# Patient Record
Sex: FEMALE | Race: BLACK OR AFRICAN AMERICAN | NOT HISPANIC OR LATINO | Employment: OTHER | URBAN - METROPOLITAN AREA
[De-identification: names, ages, dates, MRNs, and addresses within clinical notes are randomized per-mention and may not be internally consistent; named-entity substitution may affect disease eponyms.]

---

## 2017-01-19 ENCOUNTER — TELEPHONE (OUTPATIENT)
Dept: INTERNAL MEDICINE | Facility: CLINIC | Age: 82
End: 2017-01-19

## 2017-01-19 DIAGNOSIS — F03.91 DEMENTIA WITH BEHAVIORAL DISTURBANCE, UNSPECIFIED DEMENTIA TYPE: Primary | ICD-10-CM

## 2017-01-19 DIAGNOSIS — F03.90 DEMENTIA WITHOUT BEHAVIORAL DISTURBANCE, UNSPECIFIED DEMENTIA TYPE: ICD-10-CM

## 2017-01-19 DIAGNOSIS — R41.3 MEMORY CHANGES: ICD-10-CM

## 2017-01-19 NOTE — TELEPHONE ENCOUNTER
----- Message from Tila Juan sent at 1/19/2017  2:49 PM CST -----  Contact: pt's daughter maged [phone]  _  1st Request  _  2nd Request  _  3rd Request        Who: pt    Why: pt has dementia and is experiencing progress. Wants the doctor to refer her to a neurologist or does she need to be seen by dr fulton first    What Number to Call Back:pt's daughter maged [phone]    When to Expect a call back: (Before the end of the day)   -- if call after 3:00 call back will be tomorrow.

## 2017-01-20 ENCOUNTER — TELEPHONE (OUTPATIENT)
Dept: INTERNAL MEDICINE | Facility: CLINIC | Age: 82
End: 2017-01-20

## 2017-01-20 ENCOUNTER — PATIENT MESSAGE (OUTPATIENT)
Dept: NEUROLOGY | Facility: CLINIC | Age: 82
End: 2017-01-20

## 2017-01-20 NOTE — TELEPHONE ENCOUNTER
Recommend follow up with PCP regarding questionable UTI/dehydration/metabolic workup. Happy to reschedule but if there is an acute or imminently dangerous change, she should see PCP or go to ER asap.

## 2017-01-20 NOTE — TELEPHONE ENCOUNTER
----- Message from Lo Hardy sent at 1/19/2017 12:00 PM CST -----  Contact: Alicia Parker (Daughter)  X   1st Request  _  2nd Request  _  3rd Request        Who: Alicia Parker (Daughter)    Why: Pt's daughter is following up on a request to speak to the clinical team regarding a referral for Neurology. Please call, thanks!    What Number to Call Back: [phone]    When to Expect a call back: (Before the end of the day)   -- if call after 3:00 call back will be tomorrow.

## 2017-01-24 ENCOUNTER — OFFICE VISIT (OUTPATIENT)
Dept: INTERNAL MEDICINE | Facility: CLINIC | Age: 82
End: 2017-01-24
Payer: MEDICARE

## 2017-01-24 ENCOUNTER — LAB VISIT (OUTPATIENT)
Dept: LAB | Facility: OTHER | Age: 82
End: 2017-01-24
Attending: INTERNAL MEDICINE
Payer: MEDICARE

## 2017-01-24 VITALS
WEIGHT: 116.38 LBS | BODY MASS INDEX: 23.46 KG/M2 | DIASTOLIC BLOOD PRESSURE: 72 MMHG | HEIGHT: 59 IN | HEART RATE: 110 BPM | SYSTOLIC BLOOD PRESSURE: 108 MMHG | OXYGEN SATURATION: 95 %

## 2017-01-24 DIAGNOSIS — R00.0 TACHYCARDIA: ICD-10-CM

## 2017-01-24 DIAGNOSIS — R46.89 CHANGE IN BEHAVIOR: Primary | ICD-10-CM

## 2017-01-24 DIAGNOSIS — R46.89 CHANGE IN BEHAVIOR: ICD-10-CM

## 2017-01-24 LAB
ALBUMIN SERPL BCP-MCNC: 3.2 G/DL
ALP SERPL-CCNC: 76 U/L
ALT SERPL W/O P-5'-P-CCNC: 15 U/L
ANION GAP SERPL CALC-SCNC: 10 MMOL/L
AST SERPL-CCNC: 19 U/L
BASOPHILS # BLD AUTO: 0.01 K/UL
BASOPHILS NFR BLD: 0.2 %
BILIRUB SERPL-MCNC: 0.7 MG/DL
BUN SERPL-MCNC: 11 MG/DL
CALCIUM SERPL-MCNC: 9.7 MG/DL
CHLORIDE SERPL-SCNC: 103 MMOL/L
CO2 SERPL-SCNC: 25 MMOL/L
CREAT SERPL-MCNC: 0.8 MG/DL
DIFFERENTIAL METHOD: NORMAL
EOSINOPHIL # BLD AUTO: 0 K/UL
EOSINOPHIL NFR BLD: 0.6 %
ERYTHROCYTE [DISTWIDTH] IN BLOOD BY AUTOMATED COUNT: 13.6 %
EST. GFR  (AFRICAN AMERICAN): >60 ML/MIN/1.73 M^2
EST. GFR  (NON AFRICAN AMERICAN): >60 ML/MIN/1.73 M^2
GLUCOSE SERPL-MCNC: 156 MG/DL
HCT VFR BLD AUTO: 38.6 %
HGB BLD-MCNC: 12.6 G/DL
LYMPHOCYTES # BLD AUTO: 1.2 K/UL
LYMPHOCYTES NFR BLD: 23.2 %
MCH RBC QN AUTO: 27.9 PG
MCHC RBC AUTO-ENTMCNC: 32.6 %
MCV RBC AUTO: 85 FL
MONOCYTES # BLD AUTO: 0.5 K/UL
MONOCYTES NFR BLD: 9.9 %
NEUTROPHILS # BLD AUTO: 3.3 K/UL
NEUTROPHILS NFR BLD: 65.9 %
PLATELET # BLD AUTO: 225 K/UL
PMV BLD AUTO: 10.7 FL
POTASSIUM SERPL-SCNC: 3.9 MMOL/L
PROT SERPL-MCNC: 8.4 G/DL
RBC # BLD AUTO: 4.52 M/UL
SODIUM SERPL-SCNC: 138 MMOL/L
WBC # BLD AUTO: 5.05 K/UL

## 2017-01-24 PROCEDURE — 80053 COMPREHEN METABOLIC PANEL: CPT

## 2017-01-24 PROCEDURE — 1160F RVW MEDS BY RX/DR IN RCRD: CPT | Mod: S$GLB,,, | Performed by: INTERNAL MEDICINE

## 2017-01-24 PROCEDURE — 85025 COMPLETE CBC W/AUTO DIFF WBC: CPT

## 2017-01-24 PROCEDURE — 1157F ADVNC CARE PLAN IN RCRD: CPT | Mod: S$GLB,,, | Performed by: INTERNAL MEDICINE

## 2017-01-24 PROCEDURE — 1159F MED LIST DOCD IN RCRD: CPT | Mod: S$GLB,,, | Performed by: INTERNAL MEDICINE

## 2017-01-24 PROCEDURE — 99213 OFFICE O/P EST LOW 20 MIN: CPT | Mod: S$GLB,,, | Performed by: INTERNAL MEDICINE

## 2017-01-24 PROCEDURE — 99999 PR PBB SHADOW E&M-EST. PATIENT-LVL III: CPT | Mod: PBBFAC,,, | Performed by: INTERNAL MEDICINE

## 2017-01-24 PROCEDURE — 36415 COLL VENOUS BLD VENIPUNCTURE: CPT

## 2017-01-24 PROCEDURE — 1125F AMNT PAIN NOTED PAIN PRSNT: CPT | Mod: S$GLB,,, | Performed by: INTERNAL MEDICINE

## 2017-01-24 RX ORDER — ERGOCALCIFEROL 1.25 MG/1
10000 CAPSULE ORAL
COMMUNITY
End: 2017-02-07

## 2017-01-24 NOTE — PROGRESS NOTES
"Subjective:       Patient ID: Kim Bah is a 84 y.o. female.    Chief Complaint: Hearing Problem    HPI Comments:   Pt brought in by her daughter.      Pt's daughter is providing the history due to pt's dementia.    Pt's daughter is concerned about a gradual change in pt's behavior which started 2-3 weeks ago.  Pt has become more paranoid and is having some delusions.  Her sleep has also been somewhat worse.      She is not having any other sx.     Pt's daughter decreased dose of prednisone from 5 to 1 for a few days and this did not appear to make any difference.         Review of Systems   Constitutional: Negative.    HENT: Negative.    Eyes: Negative.    Respiratory: Negative.    Cardiovascular: Negative.    Gastrointestinal: Negative.    Genitourinary: Negative.    Musculoskeletal: Negative.    Skin: Negative.    Neurological: Negative.    Psychiatric/Behavioral: Negative.        Objective:       Vitals:    01/24/17 1142   BP: 108/72   Pulse: 110   SpO2: 95%   Weight: 52.8 kg (116 lb 6.5 oz)   Height: 4' 11" (1.499 m)     Physical Exam   Constitutional: She appears well-developed and well-nourished. No distress.   HENT:   Head: Normocephalic and atraumatic.   Right Ear: Tympanic membrane, external ear and ear canal normal.   Left Ear: Tympanic membrane, external ear and ear canal normal.   Mouth/Throat: Uvula is midline, oropharynx is clear and moist and mucous membranes are normal. No oropharyngeal exudate or posterior oropharyngeal erythema.   Eyes: Conjunctivae and EOM are normal. Pupils are equal, round, and reactive to light.   Neck: Normal range of motion. Neck supple.   Cardiovascular: Normal rate, regular rhythm and normal heart sounds.  Exam reveals no gallop and no friction rub.    No murmur heard.  Pulmonary/Chest: Effort normal and breath sounds normal. No respiratory distress. She has no wheezes. She has no rhonchi. She has no rales.   Musculoskeletal: She exhibits no edema.   Lymphadenopathy: "     She has no cervical adenopathy.   Neurological: She is alert.   Pt alert, making good eye contact, asking some questions.  She is responsive to commands.     Skin: No rash noted. She is not diaphoretic.   Psychiatric: She has a normal mood and affect. Her behavior is normal. Thought content normal.       Assessment:       1. Change in behavior    2. Tachycardia        Plan:           Change in behavior - Will explore a possible medical cause.  Urine dipstick was equivocal for UTI.  Will send for urine cx.  Will get CBC, CMP.      Tachycardia - Previous EKGs have shown sinus tach and this appears to be the same given rate is not much greater than 100.  Most likely whatever is causing behavior change might also be causing this.

## 2017-01-25 ENCOUNTER — PATIENT MESSAGE (OUTPATIENT)
Dept: INTERNAL MEDICINE | Facility: CLINIC | Age: 82
End: 2017-01-25

## 2017-01-26 ENCOUNTER — PATIENT MESSAGE (OUTPATIENT)
Dept: INTERNAL MEDICINE | Facility: CLINIC | Age: 82
End: 2017-01-26

## 2017-01-26 LAB
BACTERIA UR CULT: NORMAL
BACTERIA UR CULT: NORMAL

## 2017-01-26 RX ORDER — SULFAMETHOXAZOLE AND TRIMETHOPRIM 800; 160 MG/1; MG/1
1 TABLET ORAL 2 TIMES DAILY
Qty: 14 TABLET | Refills: 0 | Status: SHIPPED | OUTPATIENT
Start: 2017-01-26 | End: 2017-02-02

## 2017-01-27 ENCOUNTER — PATIENT MESSAGE (OUTPATIENT)
Dept: INTERNAL MEDICINE | Facility: CLINIC | Age: 82
End: 2017-01-27

## 2017-01-27 NOTE — TELEPHONE ENCOUNTER
Somehow pt's daughter is viewing results on MyChart but not seeing my 2 recent messages to her.  Please relay to her my most recent message and also the recommendation regarding antibiotics for a possible UTI.      However, it sounds as though her mother has worsened and I agree she may need to go to the ER.  If her urine output stops or she stops eating and drinking those would be reasons to go to the ER, as would a fever or other significant change in her behavior.

## 2017-02-02 NOTE — TELEPHONE ENCOUNTER
----- Message from Tila Martinez sent at 1/27/2017 11:48 AM CST -----  Contact: pt's daughter [phone]  _  1st Request  _  2nd Request  _  3rd Request        Who: pt's daughter [phone]    Why: pt's daughter is returning nurse's phone call    What Number to Call Back:pt's daughter [phone]    When to Expect a call back: (Before the end of the day)   -- if the call is after 12:00, the call back will be tomorrow.

## 2017-02-02 NOTE — TELEPHONE ENCOUNTER
Pt daughter was inform of med amd states that mom looks like she is getting better and will call us if she has any concerns.

## 2017-02-02 NOTE — TELEPHONE ENCOUNTER
Left patient daughter a message to contact the office regarding lab results and Dr. Power recommendations.

## 2017-02-06 ENCOUNTER — PATIENT MESSAGE (OUTPATIENT)
Dept: NEUROLOGY | Facility: CLINIC | Age: 82
End: 2017-02-06

## 2017-02-07 ENCOUNTER — HOSPITAL ENCOUNTER (INPATIENT)
Facility: OTHER | Age: 82
LOS: 2 days | Discharge: HOME HEALTH | DRG: 689 | End: 2017-02-09
Attending: EMERGENCY MEDICINE | Admitting: INTERNAL MEDICINE
Payer: MEDICARE

## 2017-02-07 DIAGNOSIS — R41.82 ALTERED MENTAL STATUS, UNSPECIFIED: Primary | ICD-10-CM

## 2017-02-07 DIAGNOSIS — R31.9 URINARY TRACT INFECTION WITH HEMATURIA, SITE UNSPECIFIED: ICD-10-CM

## 2017-02-07 DIAGNOSIS — N39.0 URINARY TRACT INFECTION WITH HEMATURIA, SITE UNSPECIFIED: ICD-10-CM

## 2017-02-07 DIAGNOSIS — F22 PARANOID DELUSION: ICD-10-CM

## 2017-02-07 DIAGNOSIS — T14.90XA TRAUMA: ICD-10-CM

## 2017-02-07 LAB
ALBUMIN SERPL BCP-MCNC: 2.9 G/DL
ALP SERPL-CCNC: 64 U/L
ALT SERPL W/O P-5'-P-CCNC: 16 U/L
ANION GAP SERPL CALC-SCNC: 9 MMOL/L
AST SERPL-CCNC: 24 U/L
BACTERIA #/AREA URNS HPF: ABNORMAL /HPF
BASOPHILS # BLD AUTO: 0.02 K/UL
BASOPHILS NFR BLD: 0.3 %
BILIRUB SERPL-MCNC: 0.2 MG/DL
BILIRUB UR QL STRIP: ABNORMAL
BUN SERPL-MCNC: 19 MG/DL
CALCIUM SERPL-MCNC: 8.6 MG/DL
CHLORIDE SERPL-SCNC: 103 MMOL/L
CLARITY UR: ABNORMAL
CO2 SERPL-SCNC: 21 MMOL/L
COLOR UR: YELLOW
CREAT SERPL-MCNC: 0.9 MG/DL
DIFFERENTIAL METHOD: ABNORMAL
EOSINOPHIL # BLD AUTO: 0.2 K/UL
EOSINOPHIL NFR BLD: 2.8 %
ERYTHROCYTE [DISTWIDTH] IN BLOOD BY AUTOMATED COUNT: 13.8 %
EST. GFR  (AFRICAN AMERICAN): >60 ML/MIN/1.73 M^2
EST. GFR  (NON AFRICAN AMERICAN): 59 ML/MIN/1.73 M^2
GLUCOSE SERPL-MCNC: 102 MG/DL
GLUCOSE UR QL STRIP: NEGATIVE
HCT VFR BLD AUTO: 39.8 %
HGB BLD-MCNC: 13.4 G/DL
HGB UR QL STRIP: ABNORMAL
HYALINE CASTS #/AREA URNS LPF: 25 /LPF
KETONES UR QL STRIP: ABNORMAL
LACTATE SERPL-SCNC: 2.8 MMOL/L
LEUKOCYTE ESTERASE UR QL STRIP: ABNORMAL
LYMPHOCYTES # BLD AUTO: 0.5 K/UL
LYMPHOCYTES NFR BLD: 8.1 %
MAGNESIUM SERPL-MCNC: 1.7 MG/DL
MCH RBC QN AUTO: 28.1 PG
MCHC RBC AUTO-ENTMCNC: 33.7 %
MCV RBC AUTO: 83 FL
MICROSCOPIC COMMENT: ABNORMAL
MONOCYTES # BLD AUTO: 0.3 K/UL
MONOCYTES NFR BLD: 4.9 %
NEUTROPHILS # BLD AUTO: 4.8 K/UL
NEUTROPHILS NFR BLD: 83.6 %
NITRITE UR QL STRIP: NEGATIVE
PH UR STRIP: 6 [PH] (ref 5–8)
PHOSPHATE SERPL-MCNC: 2.4 MG/DL
PLATELET # BLD AUTO: 157 K/UL
PMV BLD AUTO: 11.2 FL
POTASSIUM SERPL-SCNC: 4.5 MMOL/L
PROT SERPL-MCNC: 7.8 G/DL
PROT UR QL STRIP: ABNORMAL
RBC # BLD AUTO: 4.77 M/UL
RBC #/AREA URNS HPF: 10 /HPF (ref 0–4)
SODIUM SERPL-SCNC: 133 MMOL/L
SP GR UR STRIP: >=1.03 (ref 1–1.03)
TSH SERPL DL<=0.005 MIU/L-ACNC: 0.91 UIU/ML
URN SPEC COLLECT METH UR: ABNORMAL
UROBILINOGEN UR STRIP-ACNC: ABNORMAL EU/DL
WBC # BLD AUTO: 5.77 K/UL
WBC #/AREA URNS HPF: 30 /HPF (ref 0–5)

## 2017-02-07 PROCEDURE — 84443 ASSAY THYROID STIM HORMONE: CPT

## 2017-02-07 PROCEDURE — 93005 ELECTROCARDIOGRAM TRACING: CPT

## 2017-02-07 PROCEDURE — 96365 THER/PROPH/DIAG IV INF INIT: CPT

## 2017-02-07 PROCEDURE — 93010 ELECTROCARDIOGRAM REPORT: CPT | Mod: ,,, | Performed by: INTERNAL MEDICINE

## 2017-02-07 PROCEDURE — 96361 HYDRATE IV INFUSION ADD-ON: CPT

## 2017-02-07 PROCEDURE — 63600175 PHARM REV CODE 636 W HCPCS: Performed by: EMERGENCY MEDICINE

## 2017-02-07 PROCEDURE — 84100 ASSAY OF PHOSPHORUS: CPT

## 2017-02-07 PROCEDURE — 81000 URINALYSIS NONAUTO W/SCOPE: CPT

## 2017-02-07 PROCEDURE — 63600175 PHARM REV CODE 636 W HCPCS: Performed by: PHYSICIAN ASSISTANT

## 2017-02-07 PROCEDURE — 83605 ASSAY OF LACTIC ACID: CPT

## 2017-02-07 PROCEDURE — 99223 1ST HOSP IP/OBS HIGH 75: CPT | Mod: ,,, | Performed by: PHYSICIAN ASSISTANT

## 2017-02-07 PROCEDURE — 87086 URINE CULTURE/COLONY COUNT: CPT

## 2017-02-07 PROCEDURE — 25000003 PHARM REV CODE 250: Performed by: PHYSICIAN ASSISTANT

## 2017-02-07 PROCEDURE — 99285 EMERGENCY DEPT VISIT HI MDM: CPT | Mod: 25

## 2017-02-07 PROCEDURE — 87040 BLOOD CULTURE FOR BACTERIA: CPT

## 2017-02-07 PROCEDURE — 80053 COMPREHEN METABOLIC PANEL: CPT

## 2017-02-07 PROCEDURE — 11000001 HC ACUTE MED/SURG PRIVATE ROOM

## 2017-02-07 PROCEDURE — 85025 COMPLETE CBC W/AUTO DIFF WBC: CPT

## 2017-02-07 PROCEDURE — 96372 THER/PROPH/DIAG INJ SC/IM: CPT

## 2017-02-07 PROCEDURE — 25000003 PHARM REV CODE 250: Performed by: EMERGENCY MEDICINE

## 2017-02-07 PROCEDURE — 83735 ASSAY OF MAGNESIUM: CPT

## 2017-02-07 RX ORDER — SULFAMETHOXAZOLE AND TRIMETHOPRIM 800; 160 MG/1; MG/1
1 TABLET ORAL 2 TIMES DAILY
Status: ON HOLD | COMMUNITY
End: 2017-02-09 | Stop reason: HOSPADM

## 2017-02-07 RX ORDER — ENOXAPARIN SODIUM 100 MG/ML
40 INJECTION SUBCUTANEOUS EVERY 24 HOURS
Status: DISCONTINUED | OUTPATIENT
Start: 2017-02-08 | End: 2017-02-10 | Stop reason: HOSPADM

## 2017-02-07 RX ORDER — ACETAMINOPHEN 325 MG/1
650 TABLET ORAL EVERY 8 HOURS PRN
Status: DISCONTINUED | OUTPATIENT
Start: 2017-02-07 | End: 2017-02-10 | Stop reason: HOSPADM

## 2017-02-07 RX ORDER — HALOPERIDOL 5 MG/ML
2.5 INJECTION INTRAMUSCULAR
Status: COMPLETED | OUTPATIENT
Start: 2017-02-07 | End: 2017-02-07

## 2017-02-07 RX ORDER — SODIUM CHLORIDE 9 MG/ML
INJECTION, SOLUTION INTRAVENOUS CONTINUOUS
Status: DISCONTINUED | OUTPATIENT
Start: 2017-02-07 | End: 2017-02-10 | Stop reason: HOSPADM

## 2017-02-07 RX ORDER — ONDANSETRON 2 MG/ML
4 INJECTION INTRAMUSCULAR; INTRAVENOUS EVERY 12 HOURS PRN
Status: DISCONTINUED | OUTPATIENT
Start: 2017-02-07 | End: 2017-02-10 | Stop reason: HOSPADM

## 2017-02-07 RX ORDER — HALOPERIDOL 5 MG/ML
1 INJECTION INTRAMUSCULAR ONCE
Status: COMPLETED | OUTPATIENT
Start: 2017-02-07 | End: 2017-02-07

## 2017-02-07 RX ORDER — NAPROXEN SODIUM 220 MG
220 TABLET ORAL DAILY PRN
COMMUNITY

## 2017-02-07 RX ORDER — HALOPERIDOL 0.5 MG/1
1 TABLET ORAL EVERY 8 HOURS PRN
Status: DISCONTINUED | OUTPATIENT
Start: 2017-02-07 | End: 2017-02-10 | Stop reason: HOSPADM

## 2017-02-07 RX ORDER — PREDNISONE 5 MG/1
5 TABLET ORAL DAILY
Status: DISCONTINUED | OUTPATIENT
Start: 2017-02-08 | End: 2017-02-10 | Stop reason: HOSPADM

## 2017-02-07 RX ORDER — ENOXAPARIN SODIUM 100 MG/ML
40 INJECTION SUBCUTANEOUS EVERY 24 HOURS
Status: DISCONTINUED | OUTPATIENT
Start: 2017-02-07 | End: 2017-02-07

## 2017-02-07 RX ORDER — ACETAMINOPHEN 500 MG
10000 TABLET ORAL WEEKLY
Status: ON HOLD | COMMUNITY
End: 2017-12-29 | Stop reason: HOSPADM

## 2017-02-07 RX ORDER — AMOXICILLIN 250 MG
1 CAPSULE ORAL 2 TIMES DAILY
Status: DISCONTINUED | OUTPATIENT
Start: 2017-02-07 | End: 2017-02-10 | Stop reason: HOSPADM

## 2017-02-07 RX ADMIN — STANDARDIZED SENNA CONCENTRATE AND DOCUSATE SODIUM 1 TABLET: 8.6; 5 TABLET, FILM COATED ORAL at 11:02

## 2017-02-07 RX ADMIN — HALOPERIDOL LACTATE 1 MG: 5 INJECTION, SOLUTION INTRAMUSCULAR at 06:02

## 2017-02-07 RX ADMIN — HALOPERIDOL LACTATE 2.5 MG: 5 INJECTION, SOLUTION INTRAMUSCULAR at 12:02

## 2017-02-07 RX ADMIN — SODIUM CHLORIDE 1000 ML: 0.9 INJECTION, SOLUTION INTRAVENOUS at 10:02

## 2017-02-07 RX ADMIN — SODIUM CHLORIDE 1632 ML: 0.9 INJECTION, SOLUTION INTRAVENOUS at 03:02

## 2017-02-07 RX ADMIN — ENOXAPARIN SODIUM 40 MG: 100 INJECTION SUBCUTANEOUS at 11:02

## 2017-02-07 RX ADMIN — CEFTRIAXONE 1 G: 1 INJECTION, SOLUTION INTRAVENOUS at 01:02

## 2017-02-07 NOTE — H&P
"[facility] Hospital Medicine  History & Physical    Patient Name: Kim Bah  MRN: [id number]  Admission Date: 2/7/2017  Attending Physician: Osmar Felipe MD   Primary Care Provider: Pavel Power MD         Patient information was obtained from patient, relative(s), past medical records and ER records.     Subjective:     Principal Problem:Altered mental status, unspecified    Chief Complaint:   Chief Complaint   Patient presents with    Fall     she fell onto carpet after getting out of bed, found lying on her back        HPI: Kim Bah is an 84 y.o. female, with dementia and RA, who presents with altered mental status worsening x 4-5 weeks, and a slip and fall out of her bed onto her back yesterday. She was noted to state to the ED provider that she believes someone put something on the floor so she would fall "because they're out to get me".  She denies head trauma and loss of consciousness. She lives at home with her daughter and sleeps in a hospital bed with rails, which her daughter states she removes frequently. She denies headache, neck pain, chest pain, and dysuria. Her daughter states she has been paranoid and having auditory hallucinations intermittently for several months. Two weeks ago they became constant. She is taking Bactrim for a "possible UTI". Additionally, her daughter decreased her long term prednisone from 5 mg last wednesday, to 2 mg on thrusday, and finally has continued her on 1 mg over the weekend to today as she "felt the dose wasn't right." Her daughter states she was able to walk with a walker last week, but she has not walked this week, even before the fall yesterday. Her daughter reports associated loss of appetite and increased and paranoia.     Past Medical History   Diagnosis Date    Difficult intubation     Rheumatoid arthritis(714.0)      follows with Dr. Dayron Babb      Right Sided     Thyroid disease      thyroidectomy 20 " years ago       Past Surgical History   Procedure Laterality Date    Fracture surgery       right femur with pin implants     Hysterectomy      Thyroidectomy      Hernia repair       section      Eye surgery         Review of patient's allergies indicates:   Allergen Reactions    Tramadol Other (See Comments)     Hallucinations; psychosis       No current facility-administered medications on file prior to encounter.      Current Outpatient Prescriptions on File Prior to Encounter   Medication Sig    predniSONE (DELTASONE) 1 MG tablet Take 5 tablets (5 mg total) by mouth once daily.    [DISCONTINUED] ergocalciferol (VITAMIN D2) 50,000 unit Cap Take 10,000 Units by mouth every 7 days.    [DISCONTINUED] sulfaSALAzine (AZULFIDINE) 500 mg Tab Take 3 tablets (1,500 mg total) by mouth 2 (two) times daily.     Family History     Problem Relation (Age of Onset)    Cancer Maternal Aunt        Social History Main Topics    Smoking status: Never Smoker    Smokeless tobacco: Not on file    Alcohol use No    Drug use: No    Sexual activity: Not Currently     Partners: Female     Birth control/ protection: Abstinence     Review of Systems   Constitutional: Negative for activity change, chills, diaphoresis and fever.   Respiratory: Negative for cough, shortness of breath and wheezing.    Cardiovascular: Negative for chest pain.   Gastrointestinal: Positive for constipation. Negative for abdominal pain, diarrhea, nausea and vomiting.   Genitourinary: Negative for decreased urine volume, dysuria, flank pain, frequency, hematuria and urgency.   Musculoskeletal: Positive for arthralgias (hands, feet). Negative for back pain, gait problem, joint swelling, myalgias, neck pain and neck stiffness.   Skin: Negative for color change and wound.   Neurological: Negative for dizziness, syncope, weakness, light-headedness, numbness and headaches.   Hematological: Does not bruise/bleed easily.   Psychiatric/Behavioral:  Positive for agitation, confusion and hallucinations. Negative for behavioral problems, decreased concentration, self-injury and suicidal ideas.     Objective:     Vital Signs (Most Recent):  Temp: 98.7 °F (37.1 °C) (02/07/17 1600)  Pulse: 86 (02/07/17 1600)  Resp: 16 (02/07/17 1600)  BP: (!) 130/57 (02/07/17 1600)  SpO2: 97 % (02/07/17 1600) Vital Signs (24h Range):  Temp:  [98.7 °F (37.1 °C)-99.3 °F (37.4 °C)] 98.7 °F (37.1 °C)  Pulse:  [] 86  Resp:  [16-18] 16  SpO2:  [97 %] 97 %  BP: (113-148)/(55-95) 130/57     Weight: 54.4 kg (120 lb)  Body mass index is 26.9 kg/(m^2).    Physical Exam   Constitutional: She is oriented to person, place, and time. She appears well-developed and well-nourished. No distress.   HENT:   Head: Normocephalic and atraumatic.   Tacky oral mucous membranes.    Eyes: Conjunctivae and EOM are normal. Pupils are equal, round, and reactive to light. No scleral icterus.   Neck: Normal range of motion. Neck supple. No tracheal deviation present.   Cardiovascular: Normal rate, regular rhythm, normal heart sounds and intact distal pulses.  Exam reveals no gallop and no friction rub.    No murmur heard.  2+ distal radial/DT/PT pulses. No carotid bruits.    Pulmonary/Chest: Effort normal and breath sounds normal. No stridor. No respiratory distress. She has no wheezes. She has no rales. She exhibits no tenderness.   Bilateral lung fields CTA.     Abdominal: Soft. Bowel sounds are normal. She exhibits no distension and no mass. There is no tenderness. There is no rebound and no guarding. No hernia.   Flat abdomen, non-distended, normal bowel sounds, non-TTP throughout. No CVA tenderness.     Musculoskeletal: Normal range of motion. She exhibits no deformity.   Neurological: She is alert and oriented to person, place, and time. No cranial nerve deficit. She exhibits normal muscle tone.   Skin: Skin is warm and dry. No rash noted. She is not diaphoretic. No erythema. No pallor.   Psychiatric:  She has a normal mood and affect. Her behavior is normal. Judgment and thought content normal.   Nursing note and vitals reviewed.       Significant Labs:   BMP:   Recent Labs  Lab 02/07/17  1135      *   K 4.5      CO2 21*   BUN 19   CREATININE 0.9   CALCIUM 8.6*   MG 1.7     CBC:   Recent Labs  Lab 02/07/17  1035   WBC 5.77   HGB 13.4   HCT 39.8        CMP:   Recent Labs  Lab 02/07/17  1135   *   K 4.5      CO2 21*      BUN 19   CREATININE 0.9   CALCIUM 8.6*   PROT 7.8   ALBUMIN 2.9*   BILITOT 0.2   ALKPHOS 64   AST 24   ALT 16   ANIONGAP 9   EGFRNONAA 59*     Lactic Acid:   Recent Labs  Lab 02/07/17  1035   LACTATE 2.8*     Urine Culture: No results for input(s): LABURIN in the last 48 hours.  All pertinent labs within the past 24 hours have been reviewed.    Significant Imaging: I have reviewed all pertinent imaging results/findings within the past 24 hours.    Assessment/Plan:     * Altered mental status, unspecified  - Likely 2/2 UTI  - Will monitor with repeat labs in AM       Urinary tract infection with hematuria  - Treating empirically with daily IV Rocephin  - Will await C&S      Constipation  - Likely 2/2 dehydration  - Administering IV fluids and will continue to monitor.       VTE Risk Mitigation         Ordered     enoxaparin injection 40 mg  Daily     Route:  Subcutaneous        02/07/17 1641     Medium Risk of VTE  Once      02/07/17 1641        RAHUL JohnC  Department of Hospital Medicine   [facility]

## 2017-02-07 NOTE — IP AVS SNAPSHOT
[facility] Location ([facility])  [address] [place], [place] [id number]  Phone: [phone]           Patient Discharge Instructions     Our goal is to set you up for success. This packet includes information on your condition, medications, and your home care. It will help you to care for yourself so you don't get sicker and need to go back to the hospital.     Please ask your nurse if you have any questions.        There are many details to remember when preparing to leave the hospital. Here is what you will need to do:    1. Take your medicine. If you are prescribed medications, review your Medication List in the following pages. You may have new medications to  at the pharmacy and others that you'll need to stop taking. Review the instructions for how and when to take your medications. Talk with your doctor or nurses if you are unsure of what to do.     2. Go to your follow-up appointments. Specific follow-up information is listed in the following pages. Your may be contacted by a transition nurse or clinical provider about future appointments. Be sure we have all of the phone numbers to reach you, if needed. Please contact your provider's office if you are unable to make an appointment.     3. Watch for warning signs. Your doctor or nurse will give you detailed warning signs to watch for and when to call for assistance. These instructions may also include educational information about your condition. If you experience any of warning signs to your health, call your doctor.               [facility]  Unless otherwise directed by your provider, please contact [facility], our nurse care line that is available for 24/7 assistance.     [phone] (toll-free)    Registered nurses in the [facility] provide clinical advisement, health education, appointment booking, and other advisory services.                    ** Verify the list of medication(s) below is accurate and up to  date. Carry this with you in case of emergency. If your medications have changed, please notify your healthcare provider.             Medication List      START taking these medications        Additional Info                      cefUROXime 250 MG tablet   Commonly known as:  CEFTIN   Quantity:  10 tablet   Refills:  0   Dose:  250 mg    Instructions:  Take 1 tablet (250 mg total) by mouth 2 (two) times daily.     Begin Date    AM    Noon    PM    Bedtime         CONTINUE taking these medications        Additional Info                      naproxen sodium 220 MG tablet   Commonly known as:  ANAPROX   Refills:  0   Dose:  220 mg    Instructions:  Take 220 mg by mouth daily as needed (for pain).     Begin Date    AM    Noon    PM    Bedtime       predniSONE 1 MG tablet   Commonly known as:  DELTASONE   Quantity:  30 tablet   Refills:  0   Dose:  5 mg    Last time this was given:  5 mg on 2/9/2017  9:25 AM   Instructions:  Take 5 tablets (5 mg total) by mouth once daily.     Begin Date    AM    Noon    PM    Bedtime       SLEEP AID (DOXYLAMINE) ORAL   Refills:  0   Dose:  1 tablet    Instructions:  Take 1 tablet by mouth nightly as needed (for sleep).     Begin Date    AM    Noon    PM    Bedtime       VITAMIN D3 5,000 unit Tab   Refills:  0   Dose:  85317 Units   Generic drug:  cholecalciferol (vitamin D3)    Instructions:  Take 10,000 Units by mouth once a week.     Begin Date    AM    Noon    PM    Bedtime         STOP taking these medications     sulfamethoxazole-trimethoprim 800-160mg 800-160 mg Tab   Commonly known as:  BACTRIM DS            Where to Get Your Medications      These medications were sent to [facility] - [place] [address] [facility] [address]  [address] [facility] [address] [address]    Hours:  24-hours Phone:  [phone]     cefUROXime 250 MG tablet                  Please bring to all follow up appointments:    1. A copy of your discharge instructions.  2. All  medicines you are currently taking in their original bottles.  3. Identification and insurance card.    Please arrive 15 minutes ahead of scheduled appointment time.    Please call 24 hours in advance if you must reschedule your appointment and/or time.        Your Scheduled Appointments     Feb 21, 2017 10:00 AM CST   Consult with [facility] - Neurology ([address] )    [address]  [facility] [address] [phone]              Follow-up Information     Follow up with Pavel Power MD In 1 week.    Specialty:  Internal Medicine    Why:  See in 1-2 week    Contact information:    [address]  [facility] [address]  [phone]          Follow up with [facility] In 1 day.    Specialty:  Home Health Services    Why:  Home Health-starts tomorrow    Contact information:    [address]  [address]  [place] [address]  [phone]          Discharge Instructions     Future Orders    Activity as tolerated     Call MD for:  increased confusion or weakness     Call MD for:  persistent nausea and vomiting or diarrhea     Call MD for:  temperature >100.4     Diet general     Questions:    Total calories:      Fat restriction, if any:      Protein restriction, if any:      Na restriction, if any:      Fluid restriction:      Additional restrictions:          Primary Diagnosis     Your primary diagnosis was:  Altered Mental Status      Admission Information     Date & Time Provider Department CSN    2/7/2017  9:02 AM Osmar Felipe MD [facility] [id number]      Care Providers     Provider Role Specialty Primary office phone    Osmar Felipe MD Attending Provider Hospitalist [phone]      Important Medicare Message          Most Recent Value    Important Message from Medicare Regarding Discharge Appeal Rights  Given to patient/caregiver, Explained to patient/caregiver, Signed/date by patient/caregiver yes 02/09/2017 6778     "  Your Vitals Were     BP Pulse Temp Resp Height Weight    119/60 (BP Location: Right arm, Patient Position: Lying, BP Method: Automatic) 65 97.9 °F (36.6 °C) (Oral) 18 4' 8" (1.422 m) 54.4 kg (120 lb)    Last Period SpO2 BMI          (LMP Unknown) 96% 26.9 kg/m2        Recent Lab Values        12/28/2013 12/29/2013                        3:03 PM  3:06 AM          A1C 5.5 5.5                     Pending Labs     Order Current Status    Blood culture x two cultures. Draw prior to antibiotics. Preliminary result    Blood culture x two cultures. Draw prior to antibiotics. Preliminary result      Allergies as of 2/9/2017        Reactions    Tramadol Other (See Comments)    Hallucinations; psychosis      Advance Directives     An advance directive is a document which, in the event you are no longer able to make decisions for yourself, tells your healthcare team what kind of treatment you do or do not want to receive, or who you would like to make those decisions for you.  If you do not currently have an advance directive, [facility] encourages you to create one.  For more information call:  [phone]), [phone] ([phone]),  or log on to [email or web address].        Language Assistance Services     ATTENTION: Language assistance services are available, free of charge. Please call [phone].      ATENCIÓN: Si habla español, tiene a phillips disposición servicios gratuitos de asistencia lingüística. Llame al [phone].     CHÚ Ý: N?u b?n nói Ti?ng Vi?t, có các d?ch v? h? tr? ngôn ng? mi?n phí dành cho b?n. G?i s? [phone].         [facility] complies with applicable Federal civil rights laws and does not discriminate on the basis of race, color, national origin, age, disability, or sex.        "

## 2017-02-07 NOTE — ED NOTES
"Daughter states her mother was in the bed this morning and found her lying on the floor on her back.   Daughter states in the past week she has not been ambulating as usual, she reports her mother "may be dehydrated" and slightly altered, history of dementia.    Patient reports knee pain from the fall, denies hitting her head.     LOC: The patient is awake, alert. The patient is oriented x 2  APPEARANCE: Patient resting comfortably and in no acute distress, patient is clean and well groomed, patient's clothing is properly fastened.  SKIN: The skin is warm and dry, patient has normal skin turgor and moist mucus membranes, skin intact, no breakdown or brusing noted.  MUSKULOSKELETAL: Patient moving all extremities well, no obvious swelling or deformities noted   RESPIRATORY: Airway is open and patent, respirations are spontaneous, patient has a normal effort and rate. Breath sounds are clear and equal bilaterally.  CARDIAC: Normal heart sounds. No peripheral edema.  ABDOMEN: Soft and non tender to palpation, no distention noted. Bowel sounds present.        "

## 2017-02-07 NOTE — PLAN OF CARE
"** D/C PLANNING **   Pt is altered at current time, all questions and concerns discussed with patient daughter/caretaker at bedside.   Pt has been having hallucinations/aggressive behavior and her daughter is concerned about her being alone.   Patient currently lives with daughter and her grandson that stay with her often but they do not feel she is "watched" enough. Daughter just went back to work x 4 months ago.     Pt did have HH through [facility] previously. Pt hasn't had HH services x 1 year. Pt had approx. 30 hrs per week that she was seen by HH.     Needs or Concerns at this time would be:   Safety in the home... Daughter asking about HH services being started again, possible aid or sitter to come to the home. Needs during the day time when daughter is not there.     Expressed wanting a transfer tub bench and a new wheelchair. Current wheelchair was given to patient and it is "old" and has "no legs."     Will call Anny with N to inform her that patient is here and to have OP case management f/u with patient/caregiver.     PT ALSO HAS A HOSPITAL BED IN THE HOME.     IN ADDITION, PT SEES DR. REYNA AND WAS SEEN X 1 WEEK AGO.        02/07/17 1433   Discharge Assessment   Assessment Type Discharge Planning Assessment   Confirmed/corrected address and phone number on facesheet? Yes   Assessment information obtained from? Patient;Caregiver  (daughter)   Communicated expected length of stay with patient/caregiver yes   Prior to hospitilization cognitive status: Not Oriented to Place;Not Oriented to Time;Inappropriate Behavior   Prior to hospitalization functional status: Assistive Equipment;Partially Dependent   Current cognitive status: Not Oriented to Person;Not Oriented to Place;Not Oriented to Time;Inappropriate Behavior   Current Functional Status: Partially Dependent;Assistive Equipment   Arrived From home or self-care   Lives With child(bambi), adult;grandchild(bambi)   Able to Return to Prior Arrangements yes "   Is patient able to care for self after discharge? Unable to determine at this time (comments)   How many people do you have in your home that can help with your care after discharge? 1   Who are your caregiver(s) and their phone number(s)? on facesheet    Patient's perception of discharge disposition home or selfcare;home health   Readmission Within The Last 30 Days no previous admission in last 30 days   Patient currently being followed by outpatient case management? Unable to determine (comments)   Patient currently receives home health services? No   Does the patient currently use HME? Yes   Name and contact number for HME provider: through [facility] unsure...    Patient currently receives private duty nursing? No   Patient currently receives any other outside agency services? No   Equipment Currently Used at Home rollator;wheelchair;walker, standard   Do you have any problems affording any of your prescribed medications? No   Is the patient taking medications as prescribed? yes   Do you have any financial concerns preventing you from receiving the healthcare you need? No   Does the patient have transportation to healthcare appointments? Yes   Transportation Available family or friend will provide;car   On Dialysis? No   Does the patient receive services at the [facility]? No   Are there any open cases? No   Discharge Plan A Home;Home Health   Patient/Family In Agreement With Plan yes

## 2017-02-07 NOTE — ED PROVIDER NOTES
"Encounter Date: 2/7/2017    SCRIBE #1 NOTE: I, Kourtney Tapia, am scribing for, and in the presence of, Dr. Ramírez.       History     Chief Complaint   Patient presents with    Fall     she fell onto carpet after getting out of bed, found lying on her back     Review of patient's allergies indicates:   Allergen Reactions    Tramadol Other (See Comments)     Hallucinations; psychosis     HPI Comments: Time seen by provider: 9:14 AM    This is a 84 y.o. female, with dementia, who presents with complaint of bilateral knee pain. The pain began after a slip and fall out of her bed yesterday. She states that believes someone put something on the floor so she would fall "because they're out to get me".  She denies head trauma and loss of consciousness. She lives at home with her daughter and sleeps in a hospital bed with rails. She denies headache, neck pain, chest pain, and dysuria. Her daughter states she has been paranoid and having auditory hallucinations intermittently for several months. Two weeks ago they became constant. She is taking antibiotics for a "possible UTI". Her daughter states she was able to walk with a walker last week, but she has not walked this week, even before the fall yesterday. Her daughter reports associated loss of appetite and increased and paranoia.     The history is provided by the patient and a relative (daughter).     Past Medical History   Diagnosis Date    Difficult intubation     Rheumatoid arthritis(714.0)      follows with Dr. Dayron Babb      Right Sided     Thyroid disease      thyroidectomy 20 years ago     Past Medical History Pertinent Negatives   Diagnosis Date Noted    Anticoagulant long-term use 8/2/2014    Asthma 8/2/2014    Cancer 8/2/2014    CHF (congestive heart failure) 8/2/2014    COPD (chronic obstructive pulmonary disease) 8/2/2014    Coronary artery disease 8/2/2014    Diabetes mellitus 8/2/2014    Encounter for blood transfusion 8/2/2014    " "General anesthetics causing adverse effect in therapeutic use 2014    Hypertension 2014    Hypotension, iatrogenic 2014    Malignant hyperthermia 2014    PONV (postoperative nausea and vomiting) 2014    Respiratory distress 2014    Seizures 2014    Stroke 2014    Transfusion reaction 2014     Past Surgical History   Procedure Laterality Date    Fracture surgery       right femur with pin implants     Hysterectomy      Thyroidectomy      Hernia repair       section      Eye surgery       Family History   Problem Relation Age of Onset    Cancer Maternal Aunt      Social History   Substance Use Topics    Smoking status: Never Smoker    Smokeless tobacco: None    Alcohol use No     Review of Systems   Constitutional: Positive for appetite change. Negative for fever.   HENT: Negative for facial swelling.    Respiratory: Negative for shortness of breath.    Cardiovascular: Negative for chest pain.   Gastrointestinal: Negative for abdominal pain, nausea and vomiting.   Endocrine: Negative for polyuria.   Genitourinary: Negative for dysuria.   Musculoskeletal: Negative for neck pain.        Positive for bilateral knee pain.    Skin: Negative for rash.   Neurological: Negative for headaches.   Psychiatric/Behavioral: Positive for hallucinations.       Physical Exam   Initial Vitals   BP Pulse Resp Temp SpO2   17 0855 17 0855 17 0855 17 0855 --   113/65 110 18 99.3 °F (37.4 °C)      Vitals:    17 0855 17 1202 17 1302   BP: 113/65 (!) 148/95 126/70   Pulse: 110 102 106   Resp: 18     Temp: 99.3 °F (37.4 °C)     TempSrc: Oral     Weight: 54.4 kg (120 lb)     Height: 4' 8" (1.422 m)         Physical Exam    Nursing note and vitals reviewed.  Constitutional: She appears well-developed and well-nourished. She is not diaphoretic. No distress.   HENT:   Head: Normocephalic and atraumatic.   Right Ear: External ear normal. "   Left Ear: External ear normal.   Dry mucous membranes.    Eyes: EOM are normal. Right eye exhibits no discharge. Left eye exhibits no discharge.   Neck: Normal range of motion.   Cardiovascular: Normal rate, regular rhythm and normal heart sounds. Exam reveals no gallop and no friction rub.    No murmur heard.  Pulmonary/Chest: Breath sounds normal. No respiratory distress. She has no wheezes. She has no rhonchi. She has no rales.   Abdominal: Soft. There is no tenderness. There is no rebound and no guarding.   Musculoskeletal: Normal range of motion. She exhibits tenderness. She exhibits no edema.   Tenderness to palpation to the midline lower thoracic spine.   Pain with range of motion of the bilateral knees with crepitus.   Ulnar deviation of the bilateral hands with rheumatoid changes.    Neurological: She is alert and oriented to person, place, and time.   Tremor present.    Skin: Skin is warm and dry. No rash noted. No pallor.   Psychiatric: Her mood appears anxious. Her affect is labile and inappropriate. She is aggressive and combative. Thought content is paranoid and delusional. She expresses impulsivity.         ED Course   Procedures  Labs Reviewed   CBC W/ AUTO DIFFERENTIAL - Abnormal; Notable for the following:        Result Value    Lymph # 0.5 (*)     Gran% 83.6 (*)     Lymph% 8.1 (*)     All other components within normal limits   COMPREHENSIVE METABOLIC PANEL - Abnormal; Notable for the following:     Sodium 133 (*)     CO2 21 (*)     Calcium 8.6 (*)     Albumin 2.9 (*)     eGFR if non  59 (*)     All other components within normal limits    Narrative:     Recoll. [id number] by CORDELL at 02/07/2017 11:09, reason: specimen   severely hemolyzed called to Agnieszka DIXON for recollect   URINALYSIS - Abnormal; Notable for the following:     Appearance, UA Hazy (*)     Specific Gravity, UA >=1.030 (*)     Protein, UA 2+ (*)     Ketones, UA Trace (*)     Bilirubin (UA) 1+ (*)     Occult Blood  UA 1+ (*)     Urobilinogen, UA 2.0-3.0 (*)     Leukocytes, UA 1+ (*)     All other components within normal limits   PHOSPHORUS - Abnormal; Notable for the following:     Phosphorus 2.4 (*)     All other components within normal limits    Narrative:     Recoll. [id number] by BD at 02/07/2017 11:09, reason: specimen   severely hemolyzed called to Agnieszka DIXON for recollect   LACTIC ACID, PLASMA - Abnormal; Notable for the following:     Lactate (Lactic Acid) 2.8 (*)     All other components within normal limits   URINALYSIS MICROSCOPIC - Abnormal; Notable for the following:     RBC, UA 10 (*)     WBC, UA 30 (*)     Bacteria, UA Few (*)     Hyaline Casts, UA 25 (*)     All other components within normal limits   CULTURE, URINE   CULTURE, BLOOD    Narrative:     Aerobic and anaerobic   CULTURE, BLOOD    Narrative:     Aerobic and anaerobic   TSH    Narrative:     Recoll. [id number] by [email or web address] at 02/07/2017 11:09, reason: specimen   severely hemolyzed called to Agnieszka DIXON for recollect   MAGNESIUM    Narrative:     Recoll. [id number] by BD at 02/07/2017 11:09, reason: specimen   severely hemolyzed called to Agnieszka DIXON for recollect        Imaging Results         X-Ray Knee 1 or 2 View Bilateral (Final result) Result time:  02/07/17 10:46:32    Final result by Ar Robertson MD (02/07/17 10:46:32)    Impression:      Osteoporosis.  No evidence for acute fracture, bone destruction, or dislocation.  Status post ORIF of the distal left femur with healed fracture deformity.  No evidence for hardware failure.  Degenerative changes.      Electronically signed by: AR ROBERTSON MD  Date:     02/07/17  Time:    10:46     Narrative:    AP and crosstable lateral radiographs of both knees were obtained.  There is an internal fixation plate with multiple screws identified within the distal left femur with healed fracture deformity present.  There is no evidence for hardware failure.  The bones are osteoporotic but appear  intact.  There is no evidence for acute fracture or bone destruction.  There are degenerative changes present with moderate to marked narrowing of the medial compartments of the femorotibial joints, greater on the left than on the right.  There are small osteophytes at the femorotibial joints.  There is no plain film evidence for joint effusions.  Atherosclerotic calcification is identified within the distal thigh and proximal calf arteries.            X-Ray Thoracic Spine AP Lateral (Final result) Result time:  02/07/17 10:43:42    Final result by Ar Robertson MD (02/07/17 10:43:42)    Impression:      No evidence for acute fracture, bone destruction, or subluxation.  Osteoporosis.  Thoracic spondylosis.  Mild S-shaped scoliosis of the thoracic spine.      Electronically signed by: AR ROBERTSON MD  Date:     02/07/17  Time:    10:43     Narrative:    AP and lateral radiographs of the thoracic spine were obtained.  Posterior vertebral alignment is satisfactory.  There is S-shaped scoliosis of the thoracic spine.  The bones appear osteoporotic.  Vertebral body heights are well-maintained.  There are mild degenerative changes with small anterior osteophytes.  No abnormal paraspinal masses are evident.            CT Head Without Contrast (Final result) Result time:  02/07/17 10:28:32    Final result by Hank Grimm DO (02/07/17 10:28:32)    Impression:     Age-appropriate generalized cerebral volume loss with mild/moderate  degree of patchy decreased attenuation supratentorial white matter suggestive for chronic microvascular ischemic change similarly seen on the prior.     No evidence for acute intracranial hemorrhage or definite new abnormal parenchymal attenuation. Clinical correlation and further evaluation as warranted.      Electronically signed by: HANK GRIMM DO  Date:     02/07/17  Time:    10:28     Narrative:    CT brain without contrast.    Comparison: 08/29/2016    Technique: Multiple 5 mm axial  images of the head were obtained without intravenous contrast.    Results: Slight limitation by beam hardening artifact specifically limiting the posterior fossa. Generalized cerebral volume loss appropriate for age. There is mild/moderate  degree of patchy decreased attenuation throughout the supratentorial white matter suggestive for chronic microvascular ischemic change.  No evidence for acute intracranial hemorrhage or sulcal effacement. No definite new abnormal parenchyma attenuation. Ventricles are stable in size and configuration without evidence for hydrocephalus. No midline shift or mass effect.  Visualized paranasal sinuses and mastoid air cells are clear.             EKG Readings: (Independently Interpreted)   Initial Reading: No STEMI.   Sinus tachycardia. Rate of 117. Occasional PVCs present. Partial right bundle branch block, which is also present on previous. No STEMI.        X-Rays:   Independently Interpreted Readings:   Other Readings:  X-Ray Knee 1 or 2 View Bilateral: Vascular calcification and orthopedic hardware in place. No fracture. No dislocation. No acute process.   X-Ray Thoracic Spine AP Lateral: Scoliosis presents. Degenerative changes with end plate osteophytes presents.     Medical Decision Making:   Clinical Tests:   Radiological Study: Ordered and Reviewed  ED Management:  12:47 PM: Discussed the case with Narcisa Gonzalez PA-C with the hospitalist service. Patient will be admitted to Dr. Felipe.     Emergent evaluation of 84-year-old female with complaint of knee pain after a fall.  Daughter admits that actual reason for presentation is altered mental status.  Head CT, knee x-rays, T-spine x-ray showed no acute finding.  Labs show UTI.  Based on her presentation and vital signs are was concerned for possible sepsis and did use sepsis order set.  However, after full review she does not meet criteria.  She was given IV fluid bolus, Rocephin to cover her UTI, and blood and urine  cultures were taken.  She is admitted in stable condition for further care.    Additional MDM:   X-Rays: I have independently interpreted X-Ray(s) - see notes.          Scribe Attestation:   Scribe #1: I performed the above scribed service and the documentation accurately describes the services I performed. I attest to the accuracy of the note.    Attending Attestation:           Physician Attestation for Scribe:  Physician Attestation Statement for Scribe #1: I, Dr. Ramírez, reviewed documentation, as scribed by Kourtney Tapia in my presence, and it is both accurate and complete.                 ED Course     Clinical Impression:     1. Altered mental status, unspecified    2. Trauma    3. Urinary tract infection with hematuria, site unspecified    4. Paranoid delusion             Risa Ramírez MD  02/07/17 7392

## 2017-02-07 NOTE — ED NOTES
Patient combative, swing and biting nurses. Explained the process to the patient of needing to redraw blood

## 2017-02-07 NOTE — SUBJECTIVE & OBJECTIVE
Past Medical History   Diagnosis Date    Difficult intubation     Rheumatoid arthritis(714.0)      follows with Dr. Dayron Babb      Right Sided     Thyroid disease      thyroidectomy 20 years ago       Past Surgical History   Procedure Laterality Date    Fracture surgery       right femur with pin implants     Hysterectomy      Thyroidectomy      Hernia repair       section      Eye surgery         Review of patient's allergies indicates:   Allergen Reactions    Tramadol Other (See Comments)     Hallucinations; psychosis       No current facility-administered medications on file prior to encounter.      Current Outpatient Prescriptions on File Prior to Encounter   Medication Sig    predniSONE (DELTASONE) 1 MG tablet Take 5 tablets (5 mg total) by mouth once daily.    [DISCONTINUED] ergocalciferol (VITAMIN D2) 50,000 unit Cap Take 10,000 Units by mouth every 7 days.    [DISCONTINUED] sulfaSALAzine (AZULFIDINE) 500 mg Tab Take 3 tablets (1,500 mg total) by mouth 2 (two) times daily.     Family History     Problem Relation (Age of Onset)    Cancer Maternal Aunt        Social History Main Topics    Smoking status: Never Smoker    Smokeless tobacco: Not on file    Alcohol use No    Drug use: No    Sexual activity: Not Currently     Partners: Female     Birth control/ protection: Abstinence     Review of Systems   Constitutional: Negative for activity change, chills, diaphoresis and fever.   Respiratory: Negative for cough, shortness of breath and wheezing.    Cardiovascular: Negative for chest pain.   Gastrointestinal: Positive for constipation. Negative for abdominal pain, diarrhea, nausea and vomiting.   Genitourinary: Negative for decreased urine volume, dysuria, flank pain, frequency, hematuria and urgency.   Musculoskeletal: Positive for arthralgias (hands, feet). Negative for back pain, gait problem, joint swelling, myalgias, neck pain and neck stiffness.   Skin: Negative  for color change and wound.   Neurological: Negative for dizziness, syncope, weakness, light-headedness, numbness and headaches.   Hematological: Does not bruise/bleed easily.   Psychiatric/Behavioral: Positive for agitation, confusion and hallucinations. Negative for behavioral problems, decreased concentration, self-injury and suicidal ideas.     Objective:     Vital Signs (Most Recent):  Temp: 98.7 °F (37.1 °C) (02/07/17 1600)  Pulse: 86 (02/07/17 1600)  Resp: 16 (02/07/17 1600)  BP: (!) 130/57 (02/07/17 1600)  SpO2: 97 % (02/07/17 1600) Vital Signs (24h Range):  Temp:  [98.7 °F (37.1 °C)-99.3 °F (37.4 °C)] 98.7 °F (37.1 °C)  Pulse:  [] 86  Resp:  [16-18] 16  SpO2:  [97 %] 97 %  BP: (113-148)/(55-95) 130/57     Weight: 54.4 kg (120 lb)  Body mass index is 26.9 kg/(m^2).    Physical Exam   Constitutional: She is oriented to person, place, and time. She appears well-developed and well-nourished. No distress.   HENT:   Head: Normocephalic and atraumatic.   Tacky oral mucous membranes.    Eyes: Conjunctivae and EOM are normal. Pupils are equal, round, and reactive to light. No scleral icterus.   Neck: Normal range of motion. Neck supple. No tracheal deviation present.   Cardiovascular: Normal rate, regular rhythm, normal heart sounds and intact distal pulses.  Exam reveals no gallop and no friction rub.    No murmur heard.  2+ distal radial/DT/PT pulses. No carotid bruits.    Pulmonary/Chest: Effort normal and breath sounds normal. No stridor. No respiratory distress. She has no wheezes. She has no rales. She exhibits no tenderness.   Bilateral lung fields CTA.     Abdominal: Soft. Bowel sounds are normal. She exhibits no distension and no mass. There is no tenderness. There is no rebound and no guarding. No hernia.   Flat abdomen, non-distended, normal bowel sounds, non-TTP throughout. No CVA tenderness.     Musculoskeletal: Normal range of motion. She exhibits no deformity.   Neurological: She is alert and  oriented to person, place, and time. No cranial nerve deficit. She exhibits normal muscle tone.   Skin: Skin is warm and dry. No rash noted. She is not diaphoretic. No erythema. No pallor.   Psychiatric: She has a normal mood and affect. Her behavior is normal. Judgment and thought content normal.   Nursing note and vitals reviewed.       Significant Labs:   BMP:   Recent Labs  Lab 02/07/17  1135      *   K 4.5      CO2 21*   BUN 19   CREATININE 0.9   CALCIUM 8.6*   MG 1.7     CBC:   Recent Labs  Lab 02/07/17  1035   WBC 5.77   HGB 13.4   HCT 39.8        CMP:   Recent Labs  Lab 02/07/17  1135   *   K 4.5      CO2 21*      BUN 19   CREATININE 0.9   CALCIUM 8.6*   PROT 7.8   ALBUMIN 2.9*   BILITOT 0.2   ALKPHOS 64   AST 24   ALT 16   ANIONGAP 9   EGFRNONAA 59*     Lactic Acid:   Recent Labs  Lab 02/07/17  1035   LACTATE 2.8*     Urine Culture: No results for input(s): LABURIN in the last 48 hours.  All pertinent labs within the past 24 hours have been reviewed.    Significant Imaging: I have reviewed all pertinent imaging results/findings within the past 24 hours.

## 2017-02-07 NOTE — ED NOTES
Attempted to call report, nurse unavailable and Yesika stated she would have the nurse return call to ED for report

## 2017-02-08 PROBLEM — G93.49 INFECTIOUS ENCEPHALOPATHY: Status: ACTIVE | Noted: 2017-02-08

## 2017-02-08 PROBLEM — R41.82 ALTERED MENTAL STATUS, UNSPECIFIED: Status: RESOLVED | Noted: 2017-02-07 | Resolved: 2017-02-08

## 2017-02-08 PROBLEM — B99.9 INFECTIOUS ENCEPHALOPATHY: Status: ACTIVE | Noted: 2017-02-08

## 2017-02-08 LAB
ANION GAP SERPL CALC-SCNC: 6 MMOL/L
BACTERIA UR CULT: NO GROWTH
BASOPHILS # BLD AUTO: 0.01 K/UL
BASOPHILS NFR BLD: 0.3 %
BUN SERPL-MCNC: 14 MG/DL
CALCIUM SERPL-MCNC: 7.8 MG/DL
CHLORIDE SERPL-SCNC: 105 MMOL/L
CO2 SERPL-SCNC: 21 MMOL/L
CREAT SERPL-MCNC: 0.7 MG/DL
DIFFERENTIAL METHOD: ABNORMAL
EOSINOPHIL # BLD AUTO: 0.3 K/UL
EOSINOPHIL NFR BLD: 8.6 %
ERYTHROCYTE [DISTWIDTH] IN BLOOD BY AUTOMATED COUNT: 13.8 %
EST. GFR  (AFRICAN AMERICAN): >60 ML/MIN/1.73 M^2
EST. GFR  (NON AFRICAN AMERICAN): >60 ML/MIN/1.73 M^2
GLUCOSE SERPL-MCNC: 73 MG/DL
HCT VFR BLD AUTO: 35.1 %
HGB BLD-MCNC: 11.8 G/DL
LYMPHOCYTES # BLD AUTO: 0.9 K/UL
LYMPHOCYTES NFR BLD: 24.2 %
MCH RBC QN AUTO: 27.8 PG
MCHC RBC AUTO-ENTMCNC: 33.6 %
MCV RBC AUTO: 83 FL
MONOCYTES # BLD AUTO: 0.1 K/UL
MONOCYTES NFR BLD: 3.6 %
NEUTROPHILS # BLD AUTO: 2.3 K/UL
NEUTROPHILS NFR BLD: 63.3 %
PLATELET # BLD AUTO: 110 K/UL
PMV BLD AUTO: 10.7 FL
POTASSIUM SERPL-SCNC: 3.9 MMOL/L
RBC # BLD AUTO: 4.25 M/UL
SODIUM SERPL-SCNC: 132 MMOL/L
WBC # BLD AUTO: 3.6 K/UL

## 2017-02-08 PROCEDURE — 97530 THERAPEUTIC ACTIVITIES: CPT

## 2017-02-08 PROCEDURE — 11000001 HC ACUTE MED/SURG PRIVATE ROOM

## 2017-02-08 PROCEDURE — 97162 PT EVAL MOD COMPLEX 30 MIN: CPT

## 2017-02-08 PROCEDURE — 25000003 PHARM REV CODE 250: Performed by: INTERNAL MEDICINE

## 2017-02-08 PROCEDURE — 80048 BASIC METABOLIC PNL TOTAL CA: CPT

## 2017-02-08 PROCEDURE — 97112 NEUROMUSCULAR REEDUCATION: CPT

## 2017-02-08 PROCEDURE — 99232 SBSQ HOSP IP/OBS MODERATE 35: CPT | Mod: ,,, | Performed by: INTERNAL MEDICINE

## 2017-02-08 PROCEDURE — 63600175 PHARM REV CODE 636 W HCPCS: Performed by: PHYSICIAN ASSISTANT

## 2017-02-08 PROCEDURE — 36415 COLL VENOUS BLD VENIPUNCTURE: CPT

## 2017-02-08 PROCEDURE — 85025 COMPLETE CBC W/AUTO DIFF WBC: CPT

## 2017-02-08 PROCEDURE — 97532 *HC OT COG SKL DEV EA 15: CPT

## 2017-02-08 PROCEDURE — 25000003 PHARM REV CODE 250: Performed by: PHYSICIAN ASSISTANT

## 2017-02-08 PROCEDURE — 63600175 PHARM REV CODE 636 W HCPCS: Performed by: INTERNAL MEDICINE

## 2017-02-08 PROCEDURE — 97166 OT EVAL MOD COMPLEX 45 MIN: CPT

## 2017-02-08 RX ORDER — AMOXICILLIN 250 MG
1 CAPSULE ORAL 2 TIMES DAILY PRN
Status: DISCONTINUED | OUTPATIENT
Start: 2017-02-08 | End: 2017-02-10 | Stop reason: HOSPADM

## 2017-02-08 RX ORDER — HALOPERIDOL 5 MG/ML
3 INJECTION INTRAMUSCULAR ONCE
Status: DISCONTINUED | OUTPATIENT
Start: 2017-02-09 | End: 2017-02-10 | Stop reason: HOSPADM

## 2017-02-08 RX ADMIN — STANDARDIZED SENNA CONCENTRATE AND DOCUSATE SODIUM 1 TABLET: 8.6; 5 TABLET, FILM COATED ORAL at 08:02

## 2017-02-08 RX ADMIN — ENOXAPARIN SODIUM 40 MG: 100 INJECTION SUBCUTANEOUS at 10:02

## 2017-02-08 RX ADMIN — STANDARDIZED SENNA CONCENTRATE AND DOCUSATE SODIUM 1 TABLET: 8.6; 5 TABLET, FILM COATED ORAL at 09:02

## 2017-02-08 RX ADMIN — SODIUM CHLORIDE: 0.9 INJECTION, SOLUTION INTRAVENOUS at 12:02

## 2017-02-08 RX ADMIN — CEFTRIAXONE 1 G: 1 INJECTION, SOLUTION INTRAVENOUS at 04:02

## 2017-02-08 RX ADMIN — PREDNISONE 5 MG: 5 TABLET ORAL at 09:02

## 2017-02-08 RX ADMIN — HALOPERIDOL 1 MG: 0.5 TABLET ORAL at 05:02

## 2017-02-08 NOTE — PLAN OF CARE
Problem: Occupational Therapy Goal  Goal: Occupational Therapy Goal  Goals to be met by 3/8/17  1. Assess self-feeding  2. SBA compete 2 tasks during G/H with VQ to initate  3. Assess chair transfers  4. Completed self-care tasks sitting EOB with SBA  Outcome: Ongoing (interventions implemented as appropriate)  OT evaluation completed with treatment  Initiated. SNF placement is currently being recommended, with discharge home needing to be considered if the patient's emotional state does not improve.  DME: None.  CAMILLA Joseph 2/8/2017

## 2017-02-08 NOTE — SUBJECTIVE & OBJECTIVE
Interval History: no overnight events, feels better    Review of Systems   Constitutional: Negative for activity change, chills, diaphoresis and fever.   Respiratory: Negative for cough, shortness of breath and wheezing.    Cardiovascular: Negative for chest pain.   Gastrointestinal: Positive for constipation. Negative for abdominal pain, diarrhea, nausea and vomiting.   Genitourinary: Negative for decreased urine volume, dysuria, flank pain, frequency, hematuria and urgency.   Musculoskeletal: Positive for arthralgias (hands, feet). Negative for back pain, gait problem, joint swelling, myalgias, neck pain and neck stiffness.   Skin: Negative for color change and wound.   Neurological: Negative for dizziness, syncope, weakness, light-headedness, numbness and headaches.   Hematological: Does not bruise/bleed easily.   Psychiatric/Behavioral: Negative for agitation, self-injury and suicidal ideas.     Objective:     Vital Signs (Most Recent):  Temp: 98.5 °F (36.9 °C) (02/08/17 0701)  Pulse: (!) 112 (02/08/17 1000)  Resp: 16 (02/08/17 0701)  BP: (!) 108/54 (02/08/17 0701)  SpO2: 97 % (02/08/17 0701) Vital Signs (24h Range):  Temp:  [98.5 °F (36.9 °C)-99.7 °F (37.6 °C)] 98.5 °F (36.9 °C)  Pulse:  [] 112  Resp:  [16-18] 16  SpO2:  [96 %-97 %] 97 %  BP: (108-144)/(54-73) 108/54     Weight: 54.4 kg (120 lb)  Body mass index is 26.9 kg/(m^2).    Intake/Output Summary (Last 24 hours) at 02/08/17 1250  Last data filed at 02/07/17 1437   Gross per 24 hour   Intake             1050 ml   Output                0 ml   Net             1050 ml      Physical Exam   Constitutional: She appears well-developed and well-nourished. No distress.   HENT:   Head: Normocephalic and atraumatic.   Mouth/Throat: Oropharynx is clear and moist.       Eyes: Conjunctivae are normal. Right eye exhibits no discharge. Left eye exhibits no discharge. No scleral icterus.   Neck: Normal range of motion. Neck supple. No JVD present. No tracheal  deviation present.   Cardiovascular: Normal rate, regular rhythm, normal heart sounds and intact distal pulses.  Exam reveals no gallop and no friction rub.    No murmur heard.      Pulmonary/Chest: Effort normal and breath sounds normal. No stridor. No respiratory distress. She has no wheezes. She has no rales. She exhibits no tenderness.       Abdominal: Soft. Bowel sounds are normal. She exhibits no distension and no mass. There is no tenderness. There is no rebound and no guarding. No hernia.     No CVA tenderness.     Musculoskeletal: Normal range of motion.   Contracted hands   Neurological: She is alert. No cranial nerve deficit. She exhibits normal muscle tone.   Skin: Skin is warm and dry. No rash noted. She is not diaphoretic. No erythema. No pallor.   Psychiatric: She has a normal mood and affect.   Nursing note and vitals reviewed.      Significant Labs:   Blood Culture:   Recent Labs  Lab 02/07/17  1308   LABBLOO No Growth to date  No Growth to date     CBC:   Recent Labs  Lab 02/07/17  1035 02/08/17  0507   WBC 5.77 3.60*   HGB 13.4 11.8*   HCT 39.8 35.1*    110*     CMP:   Recent Labs  Lab 02/07/17  1135 02/08/17  0507   * 132*   K 4.5 3.9    105   CO2 21* 21*    73   BUN 19 14   CREATININE 0.9 0.7   CALCIUM 8.6* 7.8*   PROT 7.8  --    ALBUMIN 2.9*  --    BILITOT 0.2  --    ALKPHOS 64  --    AST 24  --    ALT 16  --    ANIONGAP 9 6*   EGFRNONAA 59* >60     Urine Culture: No results for input(s): LABURIN in the last 48 hours.  Urine Studies:   Recent Labs  Lab 02/07/17  1101   COLORU Yellow   APPEARANCEUA Hazy*   PHUR 6.0   SPECGRAV >=1.030*   PROTEINUA 2+*   GLUCUA Negative   KETONESU Trace*   BILIRUBINUA 1+*   OCCULTUA 1+*   NITRITE Negative   UROBILINOGEN 2.0-3.0*   LEUKOCYTESUR 1+*   RBCUA 10*   WBCUA 30*   BACTERIA Few*   HYALINECASTS 25*     All pertinent labs within the past 24 hours have been reviewed.    Significant Imaging:   CT Head:  Age-appropriate generalized  cerebral volume loss with mild/moderate  degree of patchy decreased attenuation supratentorial white matter suggestive for chronic microvascular ischemic change similarly seen on the prior.     No evidence for acute intracranial hemorrhage or definite new abnormal parenchymal attenuation. Clinical correlation and further evaluation as warranted.

## 2017-02-08 NOTE — PLAN OF CARE
02/08/17 1740   Discharge Assessment   Assessment Type Discharge Planning Reassessment   Confirmed/corrected address and phone number on facesheet? Yes   Assessment information obtained from? Patient;Medical Record   Prior to hospitilization cognitive status: Not Oriented to Place;Not Oriented to Person   Prior to hospitalization functional status: Assistive Equipment   Current cognitive status: Not Oriented to Person;Not Oriented to Place   Lives With child(bambi), adult   Discharge Plan A Home with family;Home Health   Patient/Family In Agreement With Plan yes

## 2017-02-08 NOTE — PROGRESS NOTES
"[facility] Hospital Medicine  Progress Note    Patient Name: Kim Bah  MRN: [id number]  Patient Class: IP- Inpatient   Admission Date: 2/7/2017  Length of Stay: 1 days  Attending Physician: Osmar Felipe MD  Primary Care Provider: Pavel Power MD        Subjective:     Principal Problem:Altered mental status, unspecified    HPI:  Kim Bah is an 84 y.o. female, with dementia and RA, who presents with altered mental status worsening x 4-5 weeks, and a slip and fall out of her bed onto her back yesterday. She was noted to state to the ED provider that she believes someone put something on the floor so she would fall "because they're out to get me".  She denies head trauma and loss of consciousness. She lives at home with her daughter and sleeps in a hospital bed with rails, which her daughter states she removes frequently. She denies headache, neck pain, chest pain, and dysuria. Her daughter states she has been paranoid and having auditory hallucinations intermittently for several months. Two weeks ago they became constant. She is taking Bactrim for a "possible UTI". Additionally, her daughter decreased her long term prednisone from 5 mg last wednesday, to 2 mg on thrusday, and finally has continued her on 1 mg over the weekend to today as she "felt the dose wasn't right." Her daughter states she was able to walk with a walker last week, but she has not walked this week, even before the fall yesterday. Her daughter reports associated loss of appetite and increased and paranoia.     Hospital Course:  Admitted with AMS, s/p fall at home; negative CT of the head, negative bilateral knee x-rays and negative T spine x-ray, likely UTI  Placed on IV Rocephin. A sepsis protocol   was initiated, but the patient does not meet sepsis criteria.       Interval History: no overnight events, feels better    Review of Systems   Constitutional: Negative for activity change, chills, diaphoresis " and fever.   Respiratory: Negative for cough, shortness of breath and wheezing.    Cardiovascular: Negative for chest pain.   Gastrointestinal: Positive for constipation. Negative for abdominal pain, diarrhea, nausea and vomiting.   Genitourinary: Negative for decreased urine volume, dysuria, flank pain, frequency, hematuria and urgency.   Musculoskeletal: Positive for arthralgias (hands, feet). Negative for back pain, gait problem, joint swelling, myalgias, neck pain and neck stiffness.   Skin: Negative for color change and wound.   Neurological: Negative for dizziness, syncope, weakness, light-headedness, numbness and headaches.   Hematological: Does not bruise/bleed easily.   Psychiatric/Behavioral: Negative for agitation, self-injury and suicidal ideas.     Objective:     Vital Signs (Most Recent):  Temp: 98.5 °F (36.9 °C) (02/08/17 0701)  Pulse: (!) 112 (02/08/17 1000)  Resp: 16 (02/08/17 0701)  BP: (!) 108/54 (02/08/17 0701)  SpO2: 97 % (02/08/17 0701) Vital Signs (24h Range):  Temp:  [98.5 °F (36.9 °C)-99.7 °F (37.6 °C)] 98.5 °F (36.9 °C)  Pulse:  [] 112  Resp:  [16-18] 16  SpO2:  [96 %-97 %] 97 %  BP: (108-144)/(54-73) 108/54     Weight: 54.4 kg (120 lb)  Body mass index is 26.9 kg/(m^2).    Intake/Output Summary (Last 24 hours) at 02/08/17 1250  Last data filed at 02/07/17 1437   Gross per 24 hour   Intake             1050 ml   Output                0 ml   Net             1050 ml      Physical Exam   Constitutional: She appears well-developed and well-nourished. No distress.   HENT:   Head: Normocephalic and atraumatic.   Mouth/Throat: Oropharynx is clear and moist.       Eyes: Conjunctivae are normal. Right eye exhibits no discharge. Left eye exhibits no discharge. No scleral icterus.   Neck: Normal range of motion. Neck supple. No JVD present. No tracheal deviation present.   Cardiovascular: Normal rate, regular rhythm, normal heart sounds and intact distal pulses.  Exam reveals no gallop and no  friction rub.    No murmur heard.      Pulmonary/Chest: Effort normal and breath sounds normal. No stridor. No respiratory distress. She has no wheezes. She has no rales. She exhibits no tenderness.       Abdominal: Soft. Bowel sounds are normal. She exhibits no distension and no mass. There is no tenderness. There is no rebound and no guarding. No hernia.     No CVA tenderness.     Musculoskeletal: Normal range of motion.   Contracted hands   Neurological: She is alert. No cranial nerve deficit. She exhibits normal muscle tone.   Skin: Skin is warm and dry. No rash noted. She is not diaphoretic. No erythema. No pallor.   Psychiatric: She has a normal mood and affect.   Nursing note and vitals reviewed.      Significant Labs:   Blood Culture:   Recent Labs  Lab 02/07/17  1308   LABBLOO No Growth to date  No Growth to date     CBC:   Recent Labs  Lab 02/07/17  1035 02/08/17  0507   WBC 5.77 3.60*   HGB 13.4 11.8*   HCT 39.8 35.1*    110*     CMP:   Recent Labs  Lab 02/07/17  1135 02/08/17  0507   * 132*   K 4.5 3.9    105   CO2 21* 21*    73   BUN 19 14   CREATININE 0.9 0.7   CALCIUM 8.6* 7.8*   PROT 7.8  --    ALBUMIN 2.9*  --    BILITOT 0.2  --    ALKPHOS 64  --    AST 24  --    ALT 16  --    ANIONGAP 9 6*   EGFRNONAA 59* >60     Urine Culture: No results for input(s): LABURIN in the last 48 hours.  Urine Studies:   Recent Labs  Lab 02/07/17  1101   COLORU Yellow   APPEARANCEUA Hazy*   PHUR 6.0   SPECGRAV >=1.030*   PROTEINUA 2+*   GLUCUA Negative   KETONESU Trace*   BILIRUBINUA 1+*   OCCULTUA 1+*   NITRITE Negative   UROBILINOGEN 2.0-3.0*   LEUKOCYTESUR 1+*   RBCUA 10*   WBCUA 30*   BACTERIA Few*   HYALINECASTS 25*     All pertinent labs within the past 24 hours have been reviewed.    Significant Imaging:   CT Head:  Age-appropriate generalized cerebral volume loss with mild/moderate  degree of patchy decreased attenuation supratentorial white matter suggestive for chronic microvascular  ischemic change similarly seen on the prior.     No evidence for acute intracranial hemorrhage or definite new abnormal parenchymal attenuation. Clinical correlation and further evaluation as warranted.        Assessment/Plan:      Rheumatoid arthritis(714.0)  - on prednisone      Constipation  - IVF's, stool softeners      Urinary tract infection with hematuria  - Cont Ceftriaxone  - follow UC      Infectious encephalopathy  - likely d/t UTI, improving      VTE Risk Mitigation         Ordered     enoxaparin injection 40 mg  Daily     Route:  Subcutaneous        02/07/17 6505     Medium Risk of VTE  Once      02/07/17 1641          Micaela Gaines PA-C  Department of Hospital Medicine   [facility]

## 2017-02-08 NOTE — PLAN OF CARE
Two unsuccessful attempts made to establish IV access: right forearm, left wrist. Deferred to House Supervisor Shreyas

## 2017-02-08 NOTE — PT/OT/SLP EVAL
Occupational Therapy  Evaluation/Treatment    Kim Bah   MRN: [id number]   Admitting Diagnosis: Altered mental status, unspecified    OT Date of Treatment: 17   OT Start Time: 1208  OT Stop Time: 1242  OT Total Time (min): 34 min    Billable Minutes:  Evaluation 22  Cognitive Retraining 12    Diagnosis: Altered mental status, unspecified     Past Medical History   Diagnosis Date    Difficult intubation     Rheumatoid arthritis(714.0)      follows with Dr. Dayron Babb      Right Sided     Thyroid disease      thyroidectomy 20 years ago      Past Surgical History   Procedure Laterality Date    Fracture surgery       right femur with pin implants     Hysterectomy      Thyroidectomy      Hernia repair       section      Eye surgery         Referring physician: MANISHA Felipe  Date referred to OT: 17    General Precautions: Standard, fall (agitated, ariana anxiety state)  Orthopedic Precautions: N/A  Braces: TLSO, N/A    Do you have any cultural, spiritual, Gnosticist conflicts, given your current situation?: None     Patient History:  Living Environment  Lives With: child(bambi), adult (daughter)  Living Arrangements:  (single story house)  Home Accessibility: stairs to enter home  Home Layout: Able to live on 1st floor  Number of Stairs to Enter Home: 1  Transportation Available: family or friend will provide  Living Environment Comment: The patient has 24 hour family assist at home.  The patients son reported recent decline in mobility and assist with personal care with increase agressive behavior and impirment in memory and cooperation with careivers at home.  Equipment Currently Used at Home: wheelchair, bedside commode, rollator, grab bar (DME available for use)    Prior level of function:   Bed Mobility/Transfers: needs device and assist (rollator)  Grooming: needs assist  Bathing: needs assist  Upper Body Dressing: needs assist  Lower Body Dressing: unable to  perform  Toileting: unable to perform  Home Management Skills: unable to perform  Driving License: No  Mode of Transportation: Family  Leisure and Hobbies: none  IADL Comments: ambulates with rollator, lots of help with bathing, dressing, toileting, assist G/H and sell-feeding     Dominant hand: right    Subjective:  The patient was found supine in bed with her son arriving soon after the session began.  The patient easily answered questions but was reluctant to participate in activity (needing her daughter, Dolly, to be present to feel safe).  She recognized her son and know his name but reported him to be her nephew.  She resisted all activities presented, getting angry and fearful needing encouragement, support and repeated change in therapist approach to participate in activities.    Chief Complaint: want to be home  Patient/Family stated goals: patient's son wants her to regain function so she can go home    Pain Rating:  (UT: severe pain reported)  Location - Side: Bilateral  Location: hand  Pain Addressed: Reposition, Cessation of Activity, Distraction  Pain Rating Post-Intervention: 0/10    Objective:  Patient found with: peripheral IV, telemetry    Cognitive Exam:  Oriented to: Person and Place and general situation  Follows Commands/attention: Follows one-step commands with encouragement and support  Communication: clear/fluent  Memory:  Impaired STM, Impaired LTM and Poor immediate recall  Safety awareness/insight to disability: impaired  Coping skills/emotional control: Labile and agitated, fearful    Visual/perceptual:  Intact, [place], no teeth    Physical Exam:  Postural examination/scapula alignment: leans backwards with poor postural background movement seated EOB  Skin integrity: Visible skin intact  Edema: None noted     Sensation:   Grossly intact for light touch both hands    Upper Extremity Range of Motion:  Right Upper Extremity: WFL  Left Upper Extremity: WFL    Upper Extremity Strength:  Right  Upper Extremity: 3-/5  Left Upper Extremity: 2/5   Strength: poor both hands    Fine motor coordination:   Poor dexterity with RA hand deformity both hands    Gross motor coordination: Fair + sitting balance EOB, refused to stand    Functional Mobility:  Bed Mobility:  Rolling/Turning to Left: Maximum assistance  Rolling/Turning Right: Maximum assistance  Scooting/Bridging: With assist of 2, Total Assistance  Supine to Sit: Moderate Assistance  Sit to Supine: Moderate Assistance    Transfers:       Functional Ambulation: none    Activities of Daily Living:  Feeding Level of Assistance: Activity did not occur (picked up water bottle, closed lid and set it on tray table, refused todrink or eat food bed  level)  UE Dressing Level of Assistance: Maximum assistance (don hospital gown bed level)  LE Dressing Level of Assistance: Total assistance (depends change during toilet hygiene - 2 person assist done bed level)  Grooming Position:  (bedc level)  Grooming Level of Assistance: Minimum assistance (wash face and hands, VQ needed for initation and completion of tasks (poor completeness of task))  Toileting Where Assessed: Bed level  Toileting Level of Assistance: Total assistance (x2)    Balance:   Static Sit: FAIR+: Able to take MINIMAL challenges from all directions  Dynamic Sit: FAIR+: Maintains balance through MINIMAL excursions of active trunk motion  Static Stand: NT  Dynamic stand: NT    Therapeutic Activities and Exercises:  Anxiety management    AM-PAC 6 CLICK ADL  How much help from another person does this patient currently need?  1 = Unable, Total/Dependent Assistance  2 = A lot, Maximum/Moderate Assistance  3 = A little, Minimum/Contact Guard/Supervision  4 = None, Modified [place]/Independent    Putting on and taking off regular lower body clothing? : 1  Bathing (including washing, rinsing, drying)?: 1  Toileting, which includes using toilet, bedpan, or urinal? : 1  Putting on and taking off regular  "upper body clothing?: 2  Taking care of personal grooming such as brushing teeth?: 3  Eating meals?: 2  Total Score: 10    AM-PAC Raw Score CMS "G-Code Modifier Level of Impairment Assistance   6 % Total / Unable   7 - 9 CM 80 - 100% Maximal Assist   10 - 14 CL 60 - 80% Moderate Assist   15 - 19 CK 40 - 60% Moderate Assist   20 - 22 CJ 20 - 40% Minimal Assist   23 CI 1-20% SBA / CGA   24 CH 0% Independent/ Mod I       Patient left supine with all lines intact, call button in reach and patient's son present    Assessment:  Kim Bah is a 85 y.o. female with a medical diagnosis of Altered mental status, unspecified and presents with impaired functional mobility and self-care sill with poor safety and cognition with fear and anxiety limiting tasks participation.  OT treatment is needed to maximize function while in the hospital..    Rehab identified problem list/impairments: Rehab identified problem list/impairments:  (Functional Performance Deficits: memory, emotional lability-anger-agitation, impaired standing-gait, pain, impaired hand function)    Rehab potential is good.    Activity tolerance: Good    Discharge recommendations: Discharge Facility/Level Of Care Needs: nursing facility, skilled (It If impaired cognition and emotional instability continue discharge home with 24 hour care is preferable to SNF placement)     Barriers to discharge: Barriers to Discharge: None    Equipment recommendations: none     GOALS:   Occupational Therapy Goals        Problem: Occupational Therapy Goal    Goal Priority Disciplines Outcome Interventions   Occupational Therapy Goal     OT, PT/OT Ongoing (interventions implemented as appropriate)    Description:  Goals to be met by 3/8/17  1. Assess self-feeding  2. SBA compete 2 tasks during G/H with VQ to initate  3. Assess chair transfers  4. Completed self-care tasks sitting EOB with SBA              PLAN:  Patient to be seen 6 x/week to address the above listed " problems via self-care/home management, therapeutic activities, therapeutic exercises, cognitive retraining  Plan of Care expires: 03/08/17  Plan of Care reviewed with: patient, family         CAMILLA Joseph  02/08/2017

## 2017-02-08 NOTE — PT/OT/SLP PROGRESS
Occupational Therapy  Not seen Note    Kim Bah  MRN: [id number]    Patient not seen today secondary to Other(PA orders received/MD orders needed for OT service, MD orders requested.). Will follow-up when MD orders re received.    CAMILLA Joseph  2/8/2017

## 2017-02-08 NOTE — PLAN OF CARE
Problem: Physical Therapy Goal  Goal: Physical Therapy Goal  Goals to be met by: 17    Patient will increase functional independence with mobility by performin. Supine to sit with min A.  2. Sit to supine with min A.   3. Rolling R and L with min A.   4. Sitting at edge of bed >5 minutes with min A.   5. Sit<>stand with RW with Min A  6. SPT from EOB <> chair with min A and use of RW  7. Gait x 10 with RW min A   Outcome: Ongoing (interventions implemented as appropriate)  PT evaluation completed. Pt remains a high fall risk at this time secondary to severe posterior trunk lean and resistance with OOB mobility. OOB with PT/OT only at this time. Please see progress note for details, POC, and recommendations.

## 2017-02-08 NOTE — PT/OT/SLP EVAL
Physical Therapy  Evaluation and Treatment     Kim Bah   MRN: [id number]   Admitting Diagnosis: Altered mental status, unspecified    PT Received On: 17  PT Start Time: 928     PT Stop Time: 957    PT Total Time (min): 29 min       Billable Minutes:  Evaluation 9, Therapeutic Activity 10 and Neuromuscular Re-education 10    Diagnosis: Altered mental status, unspecified    Past Medical History   Diagnosis Date    Difficult intubation     Rheumatoid arthritis(714.0)      follows with Dr. Dayron Babb      Right Sided     Thyroid disease      thyroidectomy 20 years ago      Past Surgical History   Procedure Laterality Date    Fracture surgery       right femur with pin implants     Hysterectomy      Thyroidectomy      Hernia repair       section      Eye surgery       Referring physician: Narcisa Gonzalez PA-C  Date referred to PT: 17    General Precautions: Standard, fall  Orthopedic Precautions: N/A     Do you have any cultural, spiritual, Tenriism conflicts, given your current situation?: None specified    Patient History:  Living Environment Comment: Pt lives with her daughter and grandson  in a one story house with 1 threshold FANY. Per previous admit 16, Pt was mod (I) with mobility using a rollator for short household distances. She is incontinent requiring assistance for bathing, dressing, and toileting. She has a hospital bed at home and is mod (I) with bed mobility and transfers. Call  placed to pt daughter who stated pt was her typical self up until 2 weeks ago she began to decline in functional mobility requiring increased assistance.   Equipment Currently Used at Home: rollator, bedside commode, hospital bed.   DME owned (not currently used): rolling walker    Previous Level of Function:  Ambulation Skills: needs device  Transfer Skills: needs device  ADL Skills: needs device and assist  Work/Leisure Activity: needs device and  "assist    Subjective:  Communicated with RN prior to session.    Chief Complaint: Fear of falling   Patient goals: None stated at this time     Pain Ratin/10   Pain Rating Post-Intervention: 0/10    Objective:   Patient found with: peripheral IV     Cognitive Exam:  Oriented to: Person and Place    Follows Commands/attention: Follows one-step commands  Communication: clear/fluent  Safety awareness/insight to disability: impaired    Physical Exam:  Postural examination/scapula alignment: Rounded shoulder and Head forward    Skin integrity: Visible skin intact  Edema: None noted     Sensation:   Pt reported WNL, increased warmth to distal BLE     Lower Extremity Range of Motion:  Right Lower Extremity: WFL  Left Lower Extremity: WFL    Lower Extremity Strength:  Right Lower Extremity: Generalized 3+  Left Lower Extremity: Generalized 3+    Gross motor coordination: WFL    Functional Mobility:  Bed Mobility:  Scooting/Bridging: Maximum Assistance (towards HOB; x 1 person drawsheet with pt in trendelenburg and pt attempting bridging with verbal cues)  Supine to Sit: Maximum Assistance (with HOB elevated; pt required verbal cues for technique, hand placement, and safety. Pt anxious with transfer requiring max A to complete )  Sit to Supine: Maximum Assistance (at BLE and trunk for safety)    Transfers:  Sit <> Stand Assistance: Moderate Assistance ( x 3 from EOB; pt required verbal cues for knee flexion and to "walker feet backwards" secondary to pt BLE too far in front increasing risk of posterior trunk LOB. Pt with severe posterior trunk lean, leaning agaist bed during transfers and gaitt)  Sit <> Stand Assistive Device: Rolling Walker    Gait:   Gait Distance: 3 x 4 side steps towards HOB to the L; pt required verbal cues for safety and anterior trunk lean with unsuccessful attempts   Assistance 1: Moderate assistance (to prevent posterior LOB)  Gait Assistive Device: Rolling walker  Gait Pattern: reciprocal  Gait " Deviation(s): decreased bee, backward lean, foot flat, decreased step length, decreased toe-to-floor clearance     Balance:   Static Sit: POOR: Needs MODERATE assist to maintain pt presents with severe posterior trunk lean and resisting support at posterior trunk. Pt with improved trunk stability with BUE support and performing pulling force on therapist (i.e. Bicep curl) for trunk support as well as improved anxiety with distraction (i.e. Asking pt about dog and grandkids).   Dynamic Sit: POOR: N/A  Static Stand: POOR: Needs MODERATE assist to maintain pt presents with severe    Dynamic stand: POOR: N/A    AM-PAC 6 CLICK MOBILITY  How much help from another person does this patient currently need?   1 = Unable, Total/Dependent Assistance  2 = A lot, Maximum/Moderate Assistance  3 = A little, Minimum/Contact Guard/Supervision  4 = None, Modified [place]/Independent    Turning over in bed (including adjusting bedclothes, sheets and blankets)?: 2  Sitting down on and standing up from a chair with arms (e.g., wheelchair, bedside commode, etc.): 2  Moving from lying on back to sitting on the side of the bed?: 2  Moving to and from a bed to a chair (including a wheelchair)?: 1  Need to walk in hospital room?: 1  Climbing 3-5 steps with a railing?: 1  Total Score: 9     AM-PAC Raw Score CMS G-Code Modifier Level of Impairment Assistance   6 % Total / Unable   7 - 9 CM 80 - 100% Maximal Assist   10 - 14 CL 60 - 80% Moderate Assist   15 - 19 CK 40 - 60% Moderate Assist   20 - 22 CJ 20 - 40% Minimal Assist   23 CI 1-20% SBA / CGA   24 CH 0% Independent/ Mod I     Patient left supine with all lines intact, call button in reach, bed alarm on, RN notified and son present.    Assessment:   Kim Bah is a 85 y.o. female with a medical diagnosis of Altered mental status, unspecified and presents with s/p fall when attempting to get out of bed PTA. Per daughter, pt has had a functional decline in status within  "the past 2 weeks requiring increased assistance at home. Prior to 2 wks, pt was mod (I) with most functional mobility. She presents with anxiety with mobility and severe posterior trunk lean during session. She remains a high fall risk at this time, OOB with PT/OT only. Pt would benefit from continued skilled PT to maximize independence and safety with functional mobility.    Rehab identified problem list/impairments: Rehab identified problem list/impairments: weakness, impaired endurance, impaired balance, gait instability, decreased lower extremity function, decreased upper extremity function, decreased safety awareness, pain, impaired cognition, impaired self care skills, impaired functional mobilty    Rehab potential is good.    Activity tolerance: Good    Discharge recommendations: Discharge Facility/Level Of Care Needs: nursing facility, skilled (pending progress; if pt to discharge home will require  supervision, home health PT/OT/ RN aide ), see OT note as well     Barriers to discharge: Barriers to Discharge: None    Equipment recommendations: Equipment Needed After Discharge: wheelchair (transport wheelchair; with 18" seat width, wheelchair cushion, removable armrests, swing away leg rests)     GOALS:   Physical Therapy Goals        Problem: Physical Therapy Goal    Goal Priority Disciplines Outcome Goal Variances Interventions   Physical Therapy Goal     PT/OT, PT Ongoing (interventions implemented as appropriate)     Description:  Goals to be met by: 17    Patient will increase functional independence with mobility by performin. Supine to sit with min A.  2. Sit to supine with min A.   3. Rolling R and L with min A.   4. Sitting at edge of bed >5 minutes with min A.   5. Sit<>stand with RW with Min A  6. SPT from EOB <> chair with min A and use of RW  7. Gait x 10' with RW min A             PLAN:    Patient to be seen 6 x/week to address the above listed problems via gait training, " therapeutic exercises, therapeutic activities, neuromuscular re-education  Plan of Care expires: 03/10/17  Plan of Care reviewed with: patient, son    Callie M Kyler, PT  02/08/2017

## 2017-02-08 NOTE — NURSING
Pt pulled her IV out at 1750.She became abusive and belligerent. I attempted to administer haldol PO at 1820 and she struck it from my hand and refused. Administered  1mg haldol IM at 1920. Hourly rounding performed. No pain reported throughout shift. Pt VSS and afebrile, free of falls, trauma. Injury, and skin break downs. Pt denies SOB, CP, & N/V. Pt in low locked bed, call light in reach.

## 2017-02-09 VITALS
HEIGHT: 56 IN | HEART RATE: 73 BPM | SYSTOLIC BLOOD PRESSURE: 119 MMHG | WEIGHT: 120 LBS | DIASTOLIC BLOOD PRESSURE: 60 MMHG | RESPIRATION RATE: 18 BRPM | OXYGEN SATURATION: 96 % | BODY MASS INDEX: 26.99 KG/M2 | TEMPERATURE: 98 F

## 2017-02-09 LAB
ANION GAP SERPL CALC-SCNC: 4 MMOL/L
BUN SERPL-MCNC: 10 MG/DL
CALCIUM SERPL-MCNC: 7.7 MG/DL
CHLORIDE SERPL-SCNC: 109 MMOL/L
CO2 SERPL-SCNC: 22 MMOL/L
CREAT SERPL-MCNC: 0.6 MG/DL
EST. GFR  (AFRICAN AMERICAN): >60 ML/MIN/1.73 M^2
EST. GFR  (NON AFRICAN AMERICAN): >60 ML/MIN/1.73 M^2
GLUCOSE SERPL-MCNC: 76 MG/DL
POTASSIUM SERPL-SCNC: 4 MMOL/L
SODIUM SERPL-SCNC: 135 MMOL/L

## 2017-02-09 PROCEDURE — 97530 THERAPEUTIC ACTIVITIES: CPT

## 2017-02-09 PROCEDURE — 99239 HOSP IP/OBS DSCHRG MGMT >30: CPT | Mod: ,,, | Performed by: PHYSICIAN ASSISTANT

## 2017-02-09 PROCEDURE — 36415 COLL VENOUS BLD VENIPUNCTURE: CPT

## 2017-02-09 PROCEDURE — 25000003 PHARM REV CODE 250: Performed by: PHYSICIAN ASSISTANT

## 2017-02-09 PROCEDURE — 25000003 PHARM REV CODE 250: Performed by: INTERNAL MEDICINE

## 2017-02-09 PROCEDURE — 63600175 PHARM REV CODE 636 W HCPCS: Performed by: PHYSICIAN ASSISTANT

## 2017-02-09 PROCEDURE — 80048 BASIC METABOLIC PNL TOTAL CA: CPT

## 2017-02-09 PROCEDURE — 63600175 PHARM REV CODE 636 W HCPCS: Performed by: INTERNAL MEDICINE

## 2017-02-09 RX ORDER — CEFUROXIME AXETIL 250 MG/1
250 TABLET ORAL 2 TIMES DAILY
Qty: 10 TABLET | Refills: 0 | Status: SHIPPED | OUTPATIENT
Start: 2017-02-09 | End: 2017-02-14

## 2017-02-09 RX ADMIN — PREDNISONE 5 MG: 5 TABLET ORAL at 09:02

## 2017-02-09 RX ADMIN — HALOPERIDOL 1 MG: 0.5 TABLET ORAL at 10:02

## 2017-02-09 RX ADMIN — ENOXAPARIN SODIUM 40 MG: 100 INJECTION SUBCUTANEOUS at 12:02

## 2017-02-09 RX ADMIN — STANDARDIZED SENNA CONCENTRATE AND DOCUSATE SODIUM 1 TABLET: 8.6; 5 TABLET, FILM COATED ORAL at 09:02

## 2017-02-09 NOTE — PLAN OF CARE
"Problem: Occupational Therapy Goal  Goal: Occupational Therapy Goal  Goals to be met by 3/8/17  1. Assess self-feeding  2. SBA compete 2 tasks during G/H with VQ to initate  3. Assess chair transfers (MET 2/9/17)  4. Completed self-care tasks sitting EOB with SBA   Outcome: Ongoing (interventions implemented as appropriate)  Pt continued to present with fear and stated "you don't trust me, look, I'm shaking."  Pt required max x2 for transfers. Pt would benefit from skilled occupational therapy intervention for increased activity tolerance and independence in ADL's.      "

## 2017-02-09 NOTE — PT/OT/SLP PROGRESS
"Occupational Therapy  Treatment    Kim Bah   MRN: [id number]   Admitting Diagnosis: Altered mental status, unspecified    OT Date of Treatment: 17   OT Start Time: 926  OT Stop Time: 945  OT Total Time (min): 19 min    Billable Minutes:  Therapeutic Activity 19    General Precautions: Standard, fall  Orthopedic Precautions: N/A  Braces:      Do you have any cultural, spiritual, Moravian conflicts, given your current situation?: None    Subjective:  Communicated with nursing prior to session.  "You don't trust me." "I don't need to stand to get the chair."    Pain Ratin/10              Pain Rating Post-Intervention: 0/10    Objective:  Patient found with: peripheral IV, telemetry     Functional Mobility:  Bed Mobility:  Supine to Sit: Moderate Assistance    Transfers:   Sit <> Stand Assistance: Maximum Assistance (x2 person assist for safety)  Sit <> Stand Assistive Device: No Assistive Device  Bed <> Chair Technique: Stand Pivot  Bed <> Chair Transfer Assistance: Maximum Assistance  Bed <> Chair Assistive Device: No Assistive Device    Functional Ambulation: n/a                Balance:   Static Sit: FAIR+: Able to take MINIMAL challenges from all directions  Dynamic Sit: FAIR+: Maintains balance through MINIMAL excursions of active trunk motion  Static Stand: 0: Needs MAXIMAL assist to maintain   Dynamic stand: POOR: N/A    Therapeutic Activities and Exercises:  Functional transfer (stand pivot) bed > recliner and bed mobility supine > sit.     AM-PAC 6 CLICK ADL   How much help from another person does this patient currently need?   1 = Unable, Total/Dependent Assistance  2 = A lot, Maximum/Moderate Assistance  3 = A little, Minimum/Contact Guard/Supervision  4 = None, Modified [place]/Independent    Putting on and taking off regular lower body clothing? : 1  Bathing (including washing, rinsing, drying)?: 1  Toileting, which includes using toilet, bedpan, or urinal? : 1  Putting on and taking " "off regular upper body clothing?: 2  Taking care of personal grooming such as brushing teeth?: 3  Eating meals?: 2  Total Score: 10     AM-PAC Raw Score CMS G-Code Modifier Level of Impairment Assistance   6 % Total / Unable   7 - 9 CM 80 - 100% Maximal Assist   10 - 14 CL 60 - 80% Moderate Assist   15 - 19 CK 40 - 60% Moderate Assist   20 - 22 CJ 20 - 40% Minimal Assist   23 CI 1-20% SBA / CGA   24 CH 0% Independent/ Mod I     Patient left up in chair with call button in reach    ASSESSMENT:  Kim Bah is a 85 y.o. female with a medical diagnosis of Altered mental status, unspecified Pt continued to present with fear and stated "you don't trust me, look, I'm shaking." Pt required max x2 for transfers. Pt would benefit from skilled occupational therapy intervention for increased activity tolerance and independence in ADL's.    Rehab identified problem list/impairments: Rehab identified problem list/impairments: impaired cognition, weakness, impaired balance, impaired self care skills, impaired functional mobilty, decreased coordination, decreased safety awareness, pain    Rehab potential is good.    Activity tolerance: Good    Discharge recommendations: Discharge Facility/Level Of Care Needs: nursing facility, skilled     Barriers to discharge: Barriers to Discharge: None    Equipment recommendations: none     GOALS:   Occupational Therapy Goals        Problem: Occupational Therapy Goal    Goal Priority Disciplines Outcome Interventions   Occupational Therapy Goal     OT, PT/OT Ongoing (interventions implemented as appropriate)    Description:  Goals to be met by 3/8/17  1. Assess self-feeding  2. SBA compete 2 tasks during G/H with VQ to initate  3. Assess chair transfers (MET 2/9/17)  4. Completed self-care tasks sitting EOB with SBA               Plan:  Patient to be seen 6 x/week to address the above listed problems via self-care/home management, therapeutic activities, therapeutic exercises, " cognitive retraining    Plan of Care reviewed with: patient         Frankyg WAGONER Maria L, OT  02/09/2017

## 2017-02-09 NOTE — PLAN OF CARE
[facility]  [address] [place]. [address]           HOME  HEALTH ORDERS        Admit to Home Health    Diagnoses:  Active Hospital Problems    Diagnosis  POA    Infectious encephalopathy [G93.49, B99.9]  Yes    Urinary tract infection with hematuria [N39.0, R31.9]  Yes    Dementia [F03.90]  Yes    Constipation [K59.00]  Yes    Rheumatoid arthritis(714.0)  Yes     Chronic      Resolved Hospital Problems    Diagnosis Date Resolved POA    *Altered mental status, unspecified [R41.82] 02/08/2017 Yes       Patient is homebound due to: Pt requires home health services due to taxing effort to leave the home as a result of weakness/debility from Altered mental status, unspecified    Face to face services were provided on 2/9/2017    Allergies:  Review of patient's allergies indicates:   Allergen Reactions    Tramadol Other (See Comments)     Hallucinations; psychosis       Diet: regular    Activity: as tolerated    Nursing:   SN to complete comprehensive assessment including routine vital signs. Instruct on disease process and s/s of complications to report to MD. Review/verify medication list sent home with the patient at time of discharge  and instruct patient/caregiver as needed. Frequency may be adjusted depending on start of care date.    Notify MD if SBP > 160 or < 90; DBP > 90 or < 50; HR > 120 or < 50; Temp > 101;     CONSULTS:     Physical Therapy to evaluate and treat. Evaluate for home safety and equipment needs; Establish/upgrade home exercise program. Perform / instruct on therapeutic exercises, gait training, transfer training, and Range of Motion.    Occupational Therapy to evaluate and treat. Evaluate home environment for safety and equipment needs. Perform/Instruct on transfers, ADL training, ROM, and therapeutic exercises.                                         Aide to provide assistance with personal care, ADLs, and vital signs       Medications: Review  discharge medications with patient and family and provide education.         Medication List      START taking these medications          cefUROXime 250 MG tablet   Commonly known as:  CEFTIN   Take 1 tablet (250 mg total) by mouth 2 (two) times daily.         CONTINUE taking these medications          naproxen sodium 220 MG tablet   Commonly known as:  ANAPROX       predniSONE 1 MG tablet   Commonly known as:  DELTASONE   Take 5 tablets (5 mg total) by mouth once daily.       SLEEP AID (DOXYLAMINE) ORAL       VITAMIN D3 5,000 unit Tab   Generic drug:  cholecalciferol (vitamin D3)         STOP taking these medications          sulfamethoxazole-trimethoprim 800-160mg 800-160 mg Tab   Commonly known as:  BACTRIM DS            Where to Get Your Medications      These medications were sent to [facility] [id number]  [address]  [address] [employer] [facility]  [address] [address]    Hours:  24-hours Phone:  [phone]     cefUROXime 250 MG tablet               _________________________________  Osmar Felipe MD      2/9/2017

## 2017-02-09 NOTE — PLAN OF CARE
Resting comfortably in bed at this time.  VSS on RA and afebrile this shift. One administration of PO Haldol 1mg..  Good appetite and good UOP this shift, incontinent care provided with marii care x4 this shift. .  Repositions frequently in bed and PT consulted and following.   Telemetry monitoring maintained. Free from injury or skin breakdown; Fall precautions maintained and call light in reach.  POC updated questions answered and comments acknowledged.  Purposeful hourly rounding completed this shift.

## 2017-02-09 NOTE — DISCHARGE SUMMARY
"[facility] Hospital Medicine  Discharge Summary      Patient Name: Kim Bah  MRN: [id number]  Admission Date: 2/7/2017  Hospital Length of Stay: 2 days  Discharge Date and Time:  02/09/2017 10:16 AM  Attending Physician: Osmar Felipe MD   Discharging Provider: Micaela Gaines PA-C  Primary Care Provider: Pavel Power MD      HPI:   Kim Bah is an 84 y.o. female, with dementia and RA, who presents with altered mental status worsening x 4-5 weeks, and a slip and fall out of her bed onto her back yesterday. She was noted to state to the ED provider that she believes someone put something on the floor so she would fall "because they're out to get me".  She denies head trauma and loss of consciousness. She lives at home with her daughter and sleeps in a hospital bed with rails, which her daughter states she removes frequently. She denies headache, neck pain, chest pain, and dysuria. Her daughter states she has been paranoid and having auditory hallucinations intermittently for several months. Two weeks ago they became constant. She is taking Bactrim for a "possible UTI". Additionally, her daughter decreased her long term prednisone from 5 mg last wednesday, to 2 mg on thrusday, and finally has continued her on 1 mg over the weekend to today as she "felt the dose wasn't right." Her daughter states she was able to walk with a walker last week, but she has not walked this week, even before the fall yesterday. Her daughter reports associated loss of appetite and increased and paranoia.     * No surgery found *      Indwelling Lines/Drains at time of discharge:   Lines/Drains/Airways          No matching active lines, drains, or airways        Hospital Course:   Admitted with AMS, s/p fall at home; negative CT of the head, negative bilateral knee x-rays and negative T spine x-ray, likely UTI  Placed on IV Rocephin, clinically improved, no fever, or WBC; Patient was on Bactrim as outpt " prior to admission; UC/BC no growth to date. VSS, home health arranged, discharged home.     Discussed results and plan of care with daughter, Vidhya, who is in agreement.        Consults:     Significant Diagnostic Studies: Labs:   CMP   Recent Labs  Lab 02/07/17  1135 02/08/17  0507 02/09/17  0530   * 132* 135*   K 4.5 3.9 4.0    105 109   CO2 21* 21* 22*    73 76   BUN 19 14 10   CREATININE 0.9 0.7 0.6   CALCIUM 8.6* 7.8* 7.7*   PROT 7.8  --   --    ALBUMIN 2.9*  --   --    BILITOT 0.2  --   --    ALKPHOS 64  --   --    AST 24  --   --    ALT 16  --   --    ANIONGAP 9 6* 4*   ESTGFRAFRICA >60 >60 >60   EGFRNONAA 59* >60 >60   , CBC   Recent Labs  Lab 02/07/17  1035 02/08/17  0507   WBC 5.77 3.60*   HGB 13.4 11.8*   HCT 39.8 35.1*    110*    and All labs within the past 24 hours have been reviewed    Pending Diagnostic Studies:     None        Final Active Diagnoses:    Diagnosis Date Noted POA    Infectious encephalopathy [G93.49, B99.9] 02/08/2017 Yes    Urinary tract infection with hematuria [N39.0, R31.9] 02/07/2017 Yes    Dementia [F03.90] 12/28/2013 Yes    Constipation [K59.00] 06/27/2013 Yes    Rheumatoid arthritis(714.0) 06/23/2013 Yes     Chronic      Problems Resolved During this Admission:    Diagnosis Date Noted Date Resolved POA    PRINCIPAL PROBLEM:  Altered mental status, unspecified [R41.82] 02/07/2017 02/08/2017 Yes      Rheumatoid arthritis(714.0)  - on prednisone      Constipation   stool softeners      Urinary tract infection with hematuria  - change to Ceftin, UC negative, patient previously on Bactrim         Infectious encephalopathy  - likely d/t UTI on chronic dementia, CT head neg acute intracranial hemorrhage or definite new abnormal parenchymal attenuation        Discharged Condition: stable    Disposition: Home-Health Care [facility]    Follow Up:  Follow-up Information     Follow up with Pavel Power MD.    Specialty:  Internal Medicine    Contact  information:    [address]  [facility] [address]  [phone]          Patient Instructions:     Diet general     Activity as tolerated     Call MD for:  temperature >100.4     Call MD for:  persistent nausea and vomiting or diarrhea     Call MD for:  increased confusion or weakness       Medications:  Reconciled Home Medications:   Current Discharge Medication List      START taking these medications    Details   cefUROXime (CEFTIN) 250 MG tablet Take 1 tablet (250 mg total) by mouth 2 (two) times daily.  Qty: 10 tablet, Refills: 0         CONTINUE these medications which have NOT CHANGED    Details   cholecalciferol, vitamin D3, (VITAMIN D3) 5,000 unit Tab Take 10,000 Units by mouth once a week.      DOXYLAMINE SUCCINATE (SLEEP AID, DOXYLAMINE, ORAL) Take 1 tablet by mouth nightly as needed (for sleep).      naproxen sodium (ANAPROX) 220 MG tablet Take 220 mg by mouth daily as needed (for pain).      predniSONE (DELTASONE) 1 MG tablet Take 5 tablets (5 mg total) by mouth once daily.  Qty: 30 tablet, Refills: 0         STOP taking these medications       sulfamethoxazole-trimethoprim 800-160mg (BACTRIM DS) 800-160 mg Tab Comments:   Reason for Stopping:             Time spent on the discharge of patient: > 30 minutes    Micaela Gaines PA-C  Department of Hospital Medicine  [facility]

## 2017-02-09 NOTE — PLAN OF CARE
02/09/17 1228   Final Note   Assessment Type Final Discharge Note   Discharge Disposition Home-Health   Discharge planning education complete? Yes   Hospital Follow Up  Appt(s) scheduled? Yes   Discharge plans and expectations educations in teach back method with documentation complete? Yes   [facility]fered [facility]? n/a   Discharge/Hospital Encounter Summary to (non[facility]) PCP Yes   Referral to Outpatient Case Management complete? n/a   Referral to / orders for Home Health Complete? Yes   30 day supply of medicines given at discharge, if documented non-compliance / non-adherence? n/a   Any social issues identified prior to discharge? n/a   Did you assess the readiness or willingness of the family or caregiver to support self management of care? Yes

## 2017-02-09 NOTE — ASSESSMENT & PLAN NOTE
- likely d/t UTI on chronic dementia, CT head neg acute intracranial hemorrhage or definite new abnormal parenchymal attenuation

## 2017-02-09 NOTE — PLAN OF CARE
Discharge Planning:  Patient admitted on 2-7-17  LOS-day 2  Chart reviewed  Care plan discussed  IMM completed today, placed in pt's blue chart  Concerned home health (per PHN provider) to provide care, starting tomorrow    Discussed care plan with treatment team  Discussed care plan with the attending Dr Felipe/Micaela DICKERSON  Current dispo -home today, dtr will  Mrs Bah later today  Case management  to follow  Consults following are: case mgt, OT, PT

## 2017-02-10 NOTE — PLAN OF CARE
Problem: Patient Care Overview  Goal: Plan of Care Review  Outcome: Ongoing (interventions implemented as appropriate)  Remains free from fall, injury, and skin breakdown. Incontinence care performed PRN.  VSS stable on RA and afebrile.  Tolerating Q2H turning schedule. Denied pain. Neuro checks WDL. Tolerating ordered diet; poor appetite noted. Up in chair with PT/OT.  IV site WNL. Plan of care reviewed with patient and all questions answered. Bed low, locked w/ bed alarm on. Call light within reach. Purposeful rounding performed. No other complaints at this time.    DC instructions will be given by DESTINY Franco.

## 2017-02-10 NOTE — PT/OT/SLP DISCHARGE
Occupational Therapy Discharge Summary    Kim Bah  MRN: [id number]   Altered mental status, unspecified   Patient Discharged from acute Occupational Therapy on 02/09/17.  Please refer to prior OT note dated on 02/09/17 for functional status.     Assessment:   Patient appropriate for care in another setting.  GOALS:   Occupational Therapy Goals        Problem: Occupational Therapy Goal    Goal Priority Disciplines Outcome Interventions   Occupational Therapy Goal     OT, PT/OT Ongoing (interventions implemented as appropriate)    Description:  Goals to be met by 3/8/17  1. Assess self-feeding  2. SBA compete 2 tasks during G/H with VQ to initate  3. Assess chair transfers (MET 2/9/17)  4. Completed self-care tasks sitting EOB with SBA             Reasons for Discontinuation of Therapy Services  Transfer to alternate level of care.      Plan:  Patient Discharged to: Home with Home Health Service.  OT recommend SNF placement.    OT of record unavailable for documentation.  CAMILLA Wilkerson, 2/10/2017

## 2017-02-10 NOTE — PLAN OF CARE
Patient Name: Kim Bah  MRN: [id number]  Admission Date: 2/7/2017  Hospital Length of Stay: 2 days  Discharge Date and Time:  02/09/2017 10:16 AM  Attending Physician: Osmar Felipe MD   Discharging Provider: Micaela Gaines PA-C  Primary Care Provider: Pavel Power MD     Discharge Planning:  Spoke with daughter. She works at [employer] till 9 p tonight. She   will  her mother after work later this evening  Patient admitted on 2-7-17    Chart reviewed  Care plan discussed  IMM completed today, placed in pt's blue chart  [facility] (per N provider) to provide care, starting tomorrow   Discussed care plan with treatment team  Discussed care plan with the attending Dr Felipe/Micaela DICKERSON  Current dispo -home today, dtr will  Mrs Bah later today  Case management to follow  Consults following are: case mgt, OT, PT

## 2017-02-10 NOTE — PROGRESS NOTES
Physical Therapy Discharge Summary    Kim Bah  MRN: [id number]   Altered mental status, unspecified   Patient Discharged from acute Physical Therapy on 17.  Please refer to prior PT noted date on 17 for functional status.     Assessment:   Patient has not met goals. Secondary to hospital discharge  GOALS:   Physical Therapy Goals        Problem: Physical Therapy Goal    Goal Priority Disciplines Outcome Goal Variances Interventions   Physical Therapy Goal     PT/OT, PT Ongoing (interventions implemented as appropriate)     Description:  Goals to be met by: 17    Patient will increase functional independence with mobility by performin. Supine to sit with min A.  2. Sit to supine with min A.   3. Rolling R and L with min A.   4. Sitting at edge of bed >5 minutes with min A.   5. Sit<>stand with RW with Min A  6. SPT from EOB <> chair with min A and use of RW  7. Gait x 10' with RW min A             Reasons for Discontinuation of Therapy Services  Transfer to alternate level of care.      Plan:  Patient Discharged to: Home with Home Health Service (PT/OT recommending SNF).

## 2017-02-12 LAB
BACTERIA BLD CULT: NORMAL
BACTERIA BLD CULT: NORMAL

## 2017-02-13 ENCOUNTER — TELEPHONE (OUTPATIENT)
Dept: INTERNAL MEDICINE | Facility: CLINIC | Age: 82
End: 2017-02-13

## 2017-02-14 RX ORDER — PREDNISONE 1 MG/1
5 TABLET ORAL DAILY
Qty: 30 TABLET | Refills: 0 | OUTPATIENT
Start: 2017-02-14

## 2017-02-21 ENCOUNTER — OFFICE VISIT (OUTPATIENT)
Dept: NEUROLOGY | Facility: CLINIC | Age: 82
End: 2017-02-21
Payer: MEDICARE

## 2017-02-21 VITALS
DIASTOLIC BLOOD PRESSURE: 64 MMHG | BODY MASS INDEX: 27.9 KG/M2 | HEART RATE: 106 BPM | SYSTOLIC BLOOD PRESSURE: 110 MMHG | WEIGHT: 124 LBS | HEIGHT: 56 IN

## 2017-02-21 DIAGNOSIS — F02.80 ALZHEIMER'S DEMENTIA WITHOUT BEHAVIORAL DISTURBANCE, UNSPECIFIED TIMING OF DEMENTIA ONSET: Primary | ICD-10-CM

## 2017-02-21 DIAGNOSIS — G30.9 ALZHEIMER'S DEMENTIA WITHOUT BEHAVIORAL DISTURBANCE, UNSPECIFIED TIMING OF DEMENTIA ONSET: Primary | ICD-10-CM

## 2017-02-21 DIAGNOSIS — E44.1 MILD MALNUTRITION: ICD-10-CM

## 2017-02-21 PROCEDURE — 1159F MED LIST DOCD IN RCRD: CPT | Mod: S$GLB,,, | Performed by: PHYSICIAN ASSISTANT

## 2017-02-21 PROCEDURE — 99499 UNLISTED E&M SERVICE: CPT | Mod: S$PBB,,, | Performed by: PHYSICIAN ASSISTANT

## 2017-02-21 PROCEDURE — 1157F ADVNC CARE PLAN IN RCRD: CPT | Mod: S$GLB,,, | Performed by: PHYSICIAN ASSISTANT

## 2017-02-21 PROCEDURE — 96118 PR NEUROPSYCH TESTING BY PSYCH/PHYS: CPT | Mod: 59,S$GLB,, | Performed by: PHYSICIAN ASSISTANT

## 2017-02-21 PROCEDURE — 99214 OFFICE O/P EST MOD 30 MIN: CPT | Mod: 25,S$GLB,, | Performed by: PHYSICIAN ASSISTANT

## 2017-02-21 PROCEDURE — 1160F RVW MEDS BY RX/DR IN RCRD: CPT | Mod: S$GLB,,, | Performed by: PHYSICIAN ASSISTANT

## 2017-02-21 PROCEDURE — 1126F AMNT PAIN NOTED NONE PRSNT: CPT | Mod: S$GLB,,, | Performed by: PHYSICIAN ASSISTANT

## 2017-02-21 PROCEDURE — 99999 PR PBB SHADOW E&M-EST. PATIENT-LVL III: CPT | Mod: PBBFAC,,, | Performed by: PHYSICIAN ASSISTANT

## 2017-02-21 NOTE — LETTER
February 21, 2017      Pavel Power MD  [address]  [facility] [address]           [facility]  [address]  [facility] [address]  Phone: [phone]  Fax: [phone]          Patient: Kim Bah   MR Number: [id number]   YOB: 1932   Date of Visit: 2/21/2017       Dear Dr. Pavel Power:    Thank you for referring Kim Bah to me for evaluation. Attached you will find relevant portions of my assessment and plan of care.    If you have questions, please do not hesitate to call me. I look forward to following Kim Bah along with you.    Sincerely,    Richa Davis PA-C    Enclosure  CC:  No Recipients    If you would like to receive this communication electronically, please contact [email or web address] or [phone] to request more information on [employer] access.    For providers and/or their staff who would like to refer a patient to [facility], please contact us through our one-stop-shop provider referral line, [facility], at [phone].    If you feel you have received this communication in error or would no longer like to receive these types of communications, please e-mail [email or web address]

## 2017-02-21 NOTE — PROGRESS NOTES
[facility], Department of Neurology   [facility] [place] [address]  Phone:[phone]  Fax: [phone]  New Patient Consultation    Patient Name: Kim Bah  : 1932  MRN:  [id number]    2017     chief complaint: Memory Loss    PCP: Pavel Power MD.    Assessment:     ICD-10-CM ICD-9-CM   1. Alzheimer's dementia without behavioral disturbance, unspecified timing of dementia onset G30.9 331.0    F02.80 294.10   2. Mild malnutrition E44.1 263.1     MOCA . Based on history, MOCA and exam likely advanced AD.     Plan:   -Reviewed results of cognitive screen and answered questions. MOCA   -Discussed likely advanced dementia.  -Diagnostic work up- Vitamin B12 level, TSH, folate, RPR - all completed and discussed WNL. CTH with chronic microvascular changes and generalized atrophy, temporal - non specific  -Discussed safety issues related to MCI- medication management, cooking, managing finances  -Discussed medications for cognitive enhancement- ACEIs and Namenda CR. Will try Namenda (patient has poor appetite already, will avoid aricept first 2/2 possible GI upset)  -Discussed NP testing. Does not want NP testing at this time zeeshan due to advanced dementia   -Recommend supplementing meals with ensure or similar   -Will continue to follow  -Follow up in 1 month    The patient indicates understanding of these issues and agrees to the plan.    HPI:   Kim Bah is a 85 y.o.  female. She presents accompanied by her daughter for evaluation and treatment of cognitive problems. Primary caregiver is her daughter.   First noted minor memory changes 12 years ago, but most noticeable about 5 years ago and has been a gradual slow decline over the years.   Her daughter noticed initially that she was purchasing multiples of things for her home. Initially had problems with ST and now LT memory if affected. She is able to identify her daughter, grandson and  granddaughter but thinks that her great granddaughter is her granddaughter. Appetite is poor. She does not eat or drink often. Daughter feeds her daily and feels she has to push to make her eat and drink enough.   For prior workup, she has had CTH, B12 level, RPR, TSH. She has had several UTIs in the past and has been sufficiently treated.   Her daughter believes she may have tried one medication for memory in the past but had delusions as a side effect. She is unsure the name of the medication.   Hallucinations have resolved; they started earlier this month and was treated for UTI. This has resolved. Daughter states she does not regularly have delusions or hallucinations.      Family hx: brother, aunt and uncles with AD.    Functional Assessment:   Activities of Daily Living (ADLs):    Independent in the following: none  Requires assistance with the following: ambulation, bathing and hygiene, feeding, continence, grooming, toileting and dressing  Instrumental Activities of Daily Living (IADLs):   Independent in the following: none  Requires assistance with the following: Finances, shopping, driving    Review of Systems  Review of Systems   Constitutional: activity change. +appetite change.   HENT: Negative for trouble swallowing.   Eyes: Negative for visual disturbance.   Respiratory: Negative for cough and shortness of breath.   Cardiovascular: Negative for chest pain and palpitations.   Gastrointestinal: Negative for nausea and diarrhea.   Genitourinary: Negative for frequency.   Musculoskeletal: back pain  Neurological: Negative for dizziness and tremors.   Psychiatric/Behavioral: Confusion, decreased concentration, hallucinations, depression, anxiety           Medication Reconciliation:   Current Outpatient Prescriptions   Medication Sig Dispense Refill    cholecalciferol, vitamin D3, (VITAMIN D3) 5,000 unit Tab Take 10,000 Units by mouth once a week.      DOXYLAMINE SUCCINATE (SLEEP AID, DOXYLAMINE, ORAL)  Take 1 tablet by mouth nightly as needed (for sleep).      naproxen sodium (ANAPROX) 220 MG tablet Take 220 mg by mouth daily as needed (for pain).      predniSONE (DELTASONE) 1 MG tablet Take 5 tablets (5 mg total) by mouth once daily. 30 tablet 0     No current facility-administered medications for this visit.      Review of patient's allergies indicates:   Allergen Reactions    Tramadol Other (See Comments)     Hallucinations; psychosis       PMHx:  Past Medical History   Diagnosis Date    Difficult intubation     Rheumatoid arthritis(714.0)      follows with Dr. Dayron Babb      Right Sided     Thyroid disease      thyroidectomy 20 years ago     Past Surgical History   Procedure Laterality Date    Fracture surgery       right femur with pin implants     Hysterectomy      Thyroidectomy      Hernia repair       section      Eye surgery         Fhx:  Family History   Problem Relation Age of Onset    Cancer Maternal Aunt        Shx:   Social History     Social History    Marital status:      Spouse name: N/A    Number of children: N/A    Years of education: N/A     Occupational History    Not on file.     Social History Main Topics    Smoking status: Never Smoker    Smokeless tobacco: Not on file    Alcohol use No    Drug use: No    Sexual activity: Not Currently     Partners: Female     Birth control/ protection: Abstinence     Other Topics Concern    Not on file     Social History Narrative    Pt has been living with her daughter for approx 2 yrs.  Pt's grandson is also there (21 yrs old).           Labs:   Lab Results   Component Value Date    HGBA1C 5.5 2013     Lab Results   Component Value Date    TSH 0.907 2017     Lab Results   Component Value Date    FUHFIYDM23 295 2016     Lab Results   Component Value Date    RPR Non-reactive 2016         Imaging:   CT head 17:    Age-appropriate generalized cerebral volume loss with  "mild/moderate  degree of patchy decreased attenuation supratentorial white matter suggestive for chronic microvascular ischemic change similarly seen on the prior.     No evidence for acute intracranial hemorrhage or definite new abnormal parenchymal attenuation. Clinical correlation and further evaluation as warranted.       CT head 8/2016:  Impression      No acute intracranial abnormality.     Sequelae of chronic small vessel disease       Note: I have independently reviewed any/all imaging/labs/tests and agree with the report (s) as documented.  Any discrepancies will be as noted/demarcated by free text.  AMANDA DICKERSON 2/21/2017        EXAM:   Visit Vitals    /64 (BP Location: Right arm, Patient Position: Sitting, BP Method: Automatic)    Pulse 106    Ht 4' 8" (1.422 m)    Wt 56.2 kg (124 lb)    LMP  (LMP Unknown)    BMI 27.8 kg/m2       Neurologic Exam     Mental Status   Oriented to person, place, and month but not year or today's date (reiterates her birthdate).   Oriented to country, city and area.    Registration of memory: recalls 2 of 5 objects. Recall of objects at 5 minutes: recalls 0 of 5 objects; only 2 recalled with multiple choice cues.   Attention: decreased. Concentration: decreased.   Speech: speech is mildly dysarthric  Level of consciousness: alert  Knowledge: poor and inconsistent with education. Unable to perform simple calculations. Unable to follow directions accurately.    Able to name object: 1 of 3 animals. Able to read. Unable to repeat: 2 0f 2 sentences. Abnormal comprehension      Attending, Dr. Pompa, was available during today's encounter. Any change to plan along with cosign to appear in the EMR.         >45 minutes spent with patient and family with majority of time spent on counseling for dementia diagnosis, etiology and education for treatment and supportive options. This document has been electronically signed by Richa Davis MPA, PA-C on 2/21/2017, 10:15 AM. I have " personally typed this message using the EMR.       CC: Pavel Power MD

## 2017-03-06 ENCOUNTER — TELEPHONE (OUTPATIENT)
Dept: INTERNAL MEDICINE | Facility: CLINIC | Age: 82
End: 2017-03-06

## 2017-03-06 NOTE — TELEPHONE ENCOUNTER
Shereen with [facility] would like a verbal order for pt to continue having HH for one more week due to a missed visit.Please advise and authorize.

## 2017-03-06 NOTE — TELEPHONE ENCOUNTER
----- Message from Tila Martinez sent at 3/6/2017  3:19 PM CST -----  Contact: carlos with [facility] [phone]  _  1st Request  _  2nd Request  _  3rd Request        Who: carlos with [facility] [phone]    Why: need verbal order for extend nursing for one week due to missed visit    What Number to Call Back:carlos with [facility] [phone]    When to Expect a call back: (Before the end of the day)   -- if the call is after 12:00, the call back will be tomorrow.

## 2017-03-07 NOTE — TELEPHONE ENCOUNTER
Shereen inform that per  pt can colntiue her physical therapy. Shereen states they will re evaluated the pt and if they need further asst they will call us back.

## 2017-03-07 NOTE — TELEPHONE ENCOUNTER
----- Message from Tila Martinez sent at 3/6/2017  3:19 PM CST -----  Contact: carlos with [facility] [phone]  _  1st Request  _  2nd Request  _  3rd Request        Who: carlos with [facility] [phone]    Why: need verbal order for extend nursing for one week due to missed visit    What Number to Call Back:carlos with [facility] [phone]    When to Expect a call back: (Before the end of the day)   -- if the call is after 12:00, the call back will be tomorrow.

## 2017-03-22 ENCOUNTER — PATIENT MESSAGE (OUTPATIENT)
Dept: NEUROLOGY | Facility: CLINIC | Age: 82
End: 2017-03-22

## 2017-03-22 ENCOUNTER — PATIENT MESSAGE (OUTPATIENT)
Dept: INTERNAL MEDICINE | Facility: CLINIC | Age: 82
End: 2017-03-22

## 2017-03-22 DIAGNOSIS — R41.89 COGNITIVE IMPAIRMENT: Primary | ICD-10-CM

## 2017-03-27 DIAGNOSIS — R41.89 COGNITIVE IMPAIRMENT: ICD-10-CM

## 2017-03-27 RX ORDER — MEMANTINE HYDROCHLORIDE 5 MG/1
TABLET ORAL
Qty: 30 TABLET | Refills: 0 | Status: SHIPPED | OUTPATIENT
Start: 2017-03-27

## 2017-03-27 RX ORDER — MEMANTINE HYDROCHLORIDE 5 MG/1
5 TABLET ORAL DAILY
Qty: 30 TABLET | Refills: 0 | Status: SHIPPED | OUTPATIENT
Start: 2017-03-27 | End: 2017-03-27 | Stop reason: SDUPTHER

## 2017-05-20 ENCOUNTER — HOSPITAL ENCOUNTER (OUTPATIENT)
Facility: HOSPITAL | Age: 82
Discharge: HOME | End: 2017-05-22
Attending: EMERGENCY MEDICINE | Admitting: HOSPITALIST
Payer: MEDICARE

## 2017-05-20 DIAGNOSIS — R55 SYNCOPE AND COLLAPSE: ICD-10-CM

## 2017-05-20 DIAGNOSIS — M05.731 RHEUMATOID ARTHRITIS INVOLVING BOTH WRISTS WITH POSITIVE RHEUMATOID FACTOR: Chronic | ICD-10-CM

## 2017-05-20 DIAGNOSIS — I25.10 CORONARY ARTERY DISEASE INVOLVING NATIVE CORONARY ARTERY OF NATIVE HEART WITHOUT ANGINA PECTORIS: ICD-10-CM

## 2017-05-20 DIAGNOSIS — R55 SYNCOPE, UNSPECIFIED SYNCOPE TYPE: Primary | ICD-10-CM

## 2017-05-20 DIAGNOSIS — R40.20 LOSS OF CONSCIOUSNESS: ICD-10-CM

## 2017-05-20 DIAGNOSIS — E83.42 HYPOMAGNESEMIA: ICD-10-CM

## 2017-05-20 DIAGNOSIS — R94.31 PROLONGED Q-T INTERVAL ON ECG: ICD-10-CM

## 2017-05-20 DIAGNOSIS — F02.818 LATE ONSET ALZHEIMER'S DISEASE WITH BEHAVIORAL DISTURBANCE: ICD-10-CM

## 2017-05-20 DIAGNOSIS — G30.1 LATE ONSET ALZHEIMER'S DISEASE WITH BEHAVIORAL DISTURBANCE: ICD-10-CM

## 2017-05-20 DIAGNOSIS — M05.732 RHEUMATOID ARTHRITIS INVOLVING BOTH WRISTS WITH POSITIVE RHEUMATOID FACTOR: Chronic | ICD-10-CM

## 2017-05-20 LAB
ALBUMIN SERPL BCP-MCNC: 2.4 G/DL
ALP SERPL-CCNC: 113 U/L
ALT SERPL W/O P-5'-P-CCNC: 21 U/L
ANION GAP SERPL CALC-SCNC: 12 MMOL/L
AST SERPL-CCNC: 33 U/L
BASOPHILS # BLD AUTO: 0.01 K/UL
BASOPHILS NFR BLD: 0.2 %
BILIRUB SERPL-MCNC: 0.9 MG/DL
BILIRUB UR QL STRIP: NEGATIVE
BNP SERPL-MCNC: 204 PG/ML
BUN SERPL-MCNC: 7 MG/DL
CALCIUM SERPL-MCNC: 8.6 MG/DL
CHLORIDE SERPL-SCNC: 102 MMOL/L
CK MB SERPL-MCNC: 1.1 NG/ML
CK MB SERPL-RTO: 4.1 %
CK SERPL-CCNC: 27 U/L
CK SERPL-CCNC: 27 U/L
CLARITY UR REFRACT.AUTO: ABNORMAL
CO2 SERPL-SCNC: 27 MMOL/L
COLOR UR AUTO: YELLOW
CREAT SERPL-MCNC: 0.6 MG/DL
DIFFERENTIAL METHOD: ABNORMAL
EOSINOPHIL # BLD AUTO: 0 K/UL
EOSINOPHIL NFR BLD: 0.6 %
ERYTHROCYTE [DISTWIDTH] IN BLOOD BY AUTOMATED COUNT: 16.2 %
EST. GFR  (AFRICAN AMERICAN): >60 ML/MIN/1.73 M^2
EST. GFR  (NON AFRICAN AMERICAN): >60 ML/MIN/1.73 M^2
GLUCOSE SERPL-MCNC: 158 MG/DL
GLUCOSE UR QL STRIP: NEGATIVE
HCT VFR BLD AUTO: 35.8 %
HGB BLD-MCNC: 12 G/DL
HGB UR QL STRIP: NEGATIVE
INR PPP: 1.2
KETONES UR QL STRIP: NEGATIVE
LEUKOCYTE ESTERASE UR QL STRIP: NEGATIVE
LYMPHOCYTES # BLD AUTO: 1 K/UL
LYMPHOCYTES NFR BLD: 16.4 %
MAGNESIUM SERPL-MCNC: 1.4 MG/DL
MCH RBC QN AUTO: 29.3 PG
MCHC RBC AUTO-ENTMCNC: 33.5 %
MCV RBC AUTO: 87 FL
MONOCYTES # BLD AUTO: 0.5 K/UL
MONOCYTES NFR BLD: 7.4 %
NEUTROPHILS # BLD AUTO: 4.7 K/UL
NEUTROPHILS NFR BLD: 75.1 %
NITRITE UR QL STRIP: NEGATIVE
PH UR STRIP: 7 [PH] (ref 5–8)
PHOSPHATE SERPL-MCNC: 3 MG/DL
PLATELET # BLD AUTO: 227 K/UL
PMV BLD AUTO: 10.3 FL
POTASSIUM SERPL-SCNC: 3.8 MMOL/L
PROT SERPL-MCNC: 7.8 G/DL
PROT UR QL STRIP: NEGATIVE
PROTHROMBIN TIME: 12.2 SEC
RBC # BLD AUTO: 4.1 M/UL
SODIUM SERPL-SCNC: 141 MMOL/L
SP GR UR STRIP: 1.01 (ref 1–1.03)
TROPONIN I SERPL DL<=0.01 NG/ML-MCNC: 0.01 NG/ML
TROPONIN I SERPL DL<=0.01 NG/ML-MCNC: 0.01 NG/ML
TSH SERPL DL<=0.005 MIU/L-ACNC: 2.6 UIU/ML
URN SPEC COLLECT METH UR: ABNORMAL
UROBILINOGEN UR STRIP-ACNC: 4 EU/DL
WBC # BLD AUTO: 6.22 K/UL

## 2017-05-20 PROCEDURE — 80053 COMPREHEN METABOLIC PANEL: CPT

## 2017-05-20 PROCEDURE — 99285 EMERGENCY DEPT VISIT HI MDM: CPT | Mod: 25

## 2017-05-20 PROCEDURE — 93010 ELECTROCARDIOGRAM REPORT: CPT | Mod: ,,, | Performed by: INTERNAL MEDICINE

## 2017-05-20 PROCEDURE — 25000003 PHARM REV CODE 250: Performed by: EMERGENCY MEDICINE

## 2017-05-20 PROCEDURE — 25000003 PHARM REV CODE 250: Performed by: INTERNAL MEDICINE

## 2017-05-20 PROCEDURE — P9612 CATHETERIZE FOR URINE SPEC: HCPCS

## 2017-05-20 PROCEDURE — 36415 COLL VENOUS BLD VENIPUNCTURE: CPT

## 2017-05-20 PROCEDURE — 81003 URINALYSIS AUTO W/O SCOPE: CPT

## 2017-05-20 PROCEDURE — 84100 ASSAY OF PHOSPHORUS: CPT

## 2017-05-20 PROCEDURE — 85025 COMPLETE CBC W/AUTO DIFF WBC: CPT

## 2017-05-20 PROCEDURE — G0378 HOSPITAL OBSERVATION PER HR: HCPCS

## 2017-05-20 PROCEDURE — 63600175 PHARM REV CODE 636 W HCPCS: Performed by: INTERNAL MEDICINE

## 2017-05-20 PROCEDURE — 84443 ASSAY THYROID STIM HORMONE: CPT

## 2017-05-20 PROCEDURE — 99285 EMERGENCY DEPT VISIT HI MDM: CPT | Mod: ,,, | Performed by: EMERGENCY MEDICINE

## 2017-05-20 PROCEDURE — 83735 ASSAY OF MAGNESIUM: CPT

## 2017-05-20 PROCEDURE — 93005 ELECTROCARDIOGRAM TRACING: CPT

## 2017-05-20 PROCEDURE — 85610 PROTHROMBIN TIME: CPT

## 2017-05-20 PROCEDURE — 87040 BLOOD CULTURE FOR BACTERIA: CPT

## 2017-05-20 PROCEDURE — 84484 ASSAY OF TROPONIN QUANT: CPT | Mod: 91

## 2017-05-20 PROCEDURE — 99220 PR INITIAL OBSERVATION CARE,LEVL III: CPT | Mod: ,,, | Performed by: INTERNAL MEDICINE

## 2017-05-20 PROCEDURE — 87086 URINE CULTURE/COLONY COUNT: CPT

## 2017-05-20 PROCEDURE — 82553 CREATINE MB FRACTION: CPT

## 2017-05-20 PROCEDURE — 84484 ASSAY OF TROPONIN QUANT: CPT

## 2017-05-20 PROCEDURE — 83880 ASSAY OF NATRIURETIC PEPTIDE: CPT

## 2017-05-20 RX ORDER — MAGNESIUM SULFATE HEPTAHYDRATE 40 MG/ML
2 INJECTION, SOLUTION INTRAVENOUS ONCE
Status: COMPLETED | OUTPATIENT
Start: 2017-05-20 | End: 2017-05-20

## 2017-05-20 RX ORDER — MEMANTINE HYDROCHLORIDE 5 MG/1
5 TABLET ORAL DAILY
Status: DISCONTINUED | OUTPATIENT
Start: 2017-05-21 | End: 2017-05-20

## 2017-05-20 RX ORDER — IBUPROFEN 200 MG
24 TABLET ORAL
Status: DISCONTINUED | OUTPATIENT
Start: 2017-05-20 | End: 2017-05-22 | Stop reason: HOSPADM

## 2017-05-20 RX ORDER — SODIUM CHLORIDE 450 MG/100ML
INJECTION, SOLUTION INTRAVENOUS CONTINUOUS
Status: DISCONTINUED | OUTPATIENT
Start: 2017-05-20 | End: 2017-05-22 | Stop reason: HOSPADM

## 2017-05-20 RX ORDER — IPRATROPIUM BROMIDE AND ALBUTEROL SULFATE 2.5; .5 MG/3ML; MG/3ML
3 SOLUTION RESPIRATORY (INHALATION) EVERY 6 HOURS PRN
Status: DISCONTINUED | OUTPATIENT
Start: 2017-05-20 | End: 2017-05-22 | Stop reason: HOSPADM

## 2017-05-20 RX ORDER — ACETAMINOPHEN 500 MG
10000 TABLET ORAL WEEKLY
Status: DISCONTINUED | OUTPATIENT
Start: 2017-05-26 | End: 2017-05-22 | Stop reason: HOSPADM

## 2017-05-20 RX ORDER — PROMETHAZINE HYDROCHLORIDE 12.5 MG/1
12.5 TABLET ORAL EVERY 6 HOURS PRN
Status: DISCONTINUED | OUTPATIENT
Start: 2017-05-20 | End: 2017-05-22 | Stop reason: HOSPADM

## 2017-05-20 RX ORDER — ACETAMINOPHEN 500 MG
500 TABLET ORAL EVERY 6 HOURS PRN
Status: DISCONTINUED | OUTPATIENT
Start: 2017-05-20 | End: 2017-05-22 | Stop reason: HOSPADM

## 2017-05-20 RX ORDER — PREDNISONE 5 MG/1
5 TABLET ORAL DAILY
Status: DISCONTINUED | OUTPATIENT
Start: 2017-05-21 | End: 2017-05-21

## 2017-05-20 RX ORDER — AMOXICILLIN 250 MG
1 CAPSULE ORAL 2 TIMES DAILY PRN
Status: DISCONTINUED | OUTPATIENT
Start: 2017-05-20 | End: 2017-05-22 | Stop reason: HOSPADM

## 2017-05-20 RX ORDER — ONDANSETRON 8 MG/1
8 TABLET, ORALLY DISINTEGRATING ORAL EVERY 8 HOURS PRN
Status: DISCONTINUED | OUTPATIENT
Start: 2017-05-20 | End: 2017-05-22 | Stop reason: HOSPADM

## 2017-05-20 RX ORDER — IBUPROFEN 200 MG
16 TABLET ORAL
Status: DISCONTINUED | OUTPATIENT
Start: 2017-05-20 | End: 2017-05-22 | Stop reason: HOSPADM

## 2017-05-20 RX ORDER — RAMELTEON 8 MG/1
8 TABLET ORAL NIGHTLY PRN
Status: DISCONTINUED | OUTPATIENT
Start: 2017-05-20 | End: 2017-05-22 | Stop reason: HOSPADM

## 2017-05-20 RX ORDER — GLUCAGON 1 MG
1 KIT INJECTION
Status: DISCONTINUED | OUTPATIENT
Start: 2017-05-20 | End: 2017-05-22 | Stop reason: HOSPADM

## 2017-05-20 RX ORDER — ENOXAPARIN SODIUM 100 MG/ML
40 INJECTION SUBCUTANEOUS
Status: DISCONTINUED | OUTPATIENT
Start: 2017-05-20 | End: 2017-05-22 | Stop reason: HOSPADM

## 2017-05-20 RX ADMIN — SODIUM CHLORIDE: 0.45 INJECTION, SOLUTION INTRAVENOUS at 09:05

## 2017-05-20 RX ADMIN — MAGNESIUM SULFATE IN WATER 2 G: 40 INJECTION, SOLUTION INTRAVENOUS at 09:05

## 2017-05-20 RX ADMIN — SODIUM CHLORIDE, SODIUM LACTATE, POTASSIUM CHLORIDE, AND CALCIUM CHLORIDE 1000 ML: .6; .31; .03; .02 INJECTION, SOLUTION INTRAVENOUS at 02:05

## 2017-05-20 RX ADMIN — POTASSIUM BICARBONATE 25 MEQ: 25 TABLET, EFFERVESCENT ORAL at 09:05

## 2017-05-20 NOTE — ED TRIAGE NOTES
"Daughter brings patient in for "passing out" while having her shower, sitting on her shower chair. Daughter provides the history:  Patient had coffee and breakfast this morning, took shower and had a couple minute episode where she did not respond to daughter. After this, patient became aware again.  Pt is confused at baseline, alert, oriented to person and place only.  Denies cough  +confusion more than baseline, pt was talking to the TV last night "out of her head" per daughter who states a similar episode when she had a UTI previously.    Patient at 100% SPO2  "

## 2017-05-20 NOTE — ED NOTES
Straight cath 16fr intermittent cath for urine sample. Obtained 200cc clear yellow urine. Patient tolerated well. Rodriguez DIXON assisted. Urine sent to lab and patient repositioned for comfort.

## 2017-05-20 NOTE — ED PROVIDER NOTES
Encounter Date: 2017       History     Chief Complaint   Patient presents with    Loss of Consciousness     Review of patient's allergies indicates:   Allergen Reactions    Tramadol Other (See Comments)     Hallucinations; psychosis     HPI Comments: 85 year old female with hx of NSTEMI in 2013 presents with an episode of loss of consciousness PTA. Per daughter, she was bathing the mother when suddenly she became unresponsive for a few minutes. She responded to verbal stimuli and tap one her back and was as at baseline prior to arriving at the hospital. Per the daughter, she was speaking non-sense last night and usually that is a sign the patient has a UTI. However the patient herself is denying any dysuria, hematuria, melena, chest pain, SOB.     Past Medical History:   Diagnosis Date    Difficult intubation     Rheumatoid arthritis(714.0)     follows with Dr. Dayron Babb     Right Sided     Thyroid disease     thyroidectomy 20 years ago     Past Surgical History:   Procedure Laterality Date     SECTION      EYE SURGERY      FRACTURE SURGERY      right femur with pin implants     HERNIA REPAIR      HYSTERECTOMY      THYROIDECTOMY       Family History   Problem Relation Age of Onset    Cancer Maternal Aunt      Social History   Substance Use Topics    Smoking status: Never Smoker    Smokeless tobacco: Not on file    Alcohol use No     Review of Systems   Constitutional: Negative for chills, diaphoresis and fever.   HENT: Negative for congestion.    Eyes: Negative for visual disturbance.   Respiratory: Negative for chest tightness, shortness of breath and wheezing.    Cardiovascular: Negative for chest pain, palpitations and leg swelling.   Gastrointestinal: Negative for abdominal pain, blood in stool, constipation, diarrhea and nausea.   Genitourinary: Negative for dysuria and hematuria.   Musculoskeletal: Negative for back pain.   Skin: Negative for rash.   Neurological:  Positive for syncope. Negative for seizures, facial asymmetry, weakness, numbness and headaches.   Psychiatric/Behavioral: Negative for confusion.     All systems were reviewed/examined and were negative except as noted in the HPI.      Physical Exam   Initial Vitals   BP Pulse Resp Temp SpO2   05/20/17 1334 05/20/17 1334 05/20/17 1334 05/20/17 1334 05/20/17 1334   141/72 81 16 97.5 °F (36.4 °C) 100 %     Physical Exam    Constitutional: She is not diaphoretic. No distress.   HENT:   Head: Normocephalic and atraumatic.   Eyes: EOM are normal. Pupils are equal, round, and reactive to light.   Neck: Normal range of motion.   Cardiovascular: Normal rate, regular rhythm and normal heart sounds. Exam reveals no gallop and no friction rub.    No murmur heard.  Pulmonary/Chest: Breath sounds normal. No respiratory distress. She has no wheezes. She has no rhonchi. She has no rales.   Abdominal: Soft. Bowel sounds are normal. She exhibits no distension. There is no tenderness. There is no rebound and no guarding.   Musculoskeletal: She exhibits no edema or tenderness.   Neurological: She is alert. No cranial nerve deficit or sensory deficit.   Alert to person and location but responded 1979 for the year. Hx of dementia but at baseline per daughter    Skin: Skin is warm.         ED Course   Procedures  Labs Reviewed - No data to display                APC / Resident Notes:   85 year old female presenting with an episode of passing out lasting minutes per daughter but at baseline on arrival.    Ddx: UTI, SIRS/sepsis, ACS, electrolyte abnormality, dehydration, anemia, dysrhythmia     Will get labs, EKG, IVF, cultures, and reassess. i anticipate admission for monitoring and inpatient workup    Que Redd MD   Emergency Medicine PGY 2   3:13 PM 5/20/2017       labs no critical findings  Imaging reviewed by me personally and prelims if available.  No acute/emergent findings noted on radiologic studies ordered.  Neuro at baseline,  nonfocal, no head strike, do not think CT of head clinically indicated             ED Course     Clinical Impression:   Diagnoses of Loss of consciousness, Syncope and collapse, Prolonged Q-T interval on ECG, Hypomagnesemia, Coronary artery disease involving native coronary artery of native heart without angina pectoris, Rheumatoid arthritis involving both wrists with positive rheumatoid factor, and Late onset Alzheimer's disease with behavioral disturbance were pertinent to this visit.     medicine, fair condition    ATTENDING PHYSICIAN ATTESTATION  I have repeated the key portions of the resident's history and physical, reviewed and agree with the resident medical documentation, and supervised and managed the medical care of the patient.  Additionally, I was present for the critical portion of any procedure(s) performed.    Jt Gaines MD, KATHERINE, [facility]  Department of Emergency Medicine    ECG: normal sinus rhythm, no critical findings with intervals, normal rate, and no ischemia.  Compared with prior if available.       Jermaine Gaines MD  05/21/17 0799

## 2017-05-20 NOTE — H&P
"[facility] Medicine  History & Physical    Patient Name: Kim Bah  MRN: [id number]  Admission Date: 5/20/2017  Attending Physician: Maggie Gale MD  Primary Care Provider: Pavel Power MD    [facility] Medicine Team: [facility] [place], [place] HOSP MED CIARAN Lara MD     Patient information was obtained from patient, past medical records and ER records.     Subjective:     Principal Problem: Syncope and collapse    Chief Complaint:   Chief Complaint   Patient presents with    Loss of Consciousness        HPI: Ms. Bah is an 89yo lady with a past medical history of UTI, advanced alzheimer's dementia for 12 years, rheumatoid arthritis, postsurgical hypothyroidism and CAD with MI in 2012.  She is followed by Dr. Pompa here in  Neurology.  She is cared for by her daughter.  It has been well noted that recently her PO intake and appetite have been declining with her cognitive deficits, usually requiring her daughter having to push her to eat and drink.      She now comes to the ED after a brief loss of consciousness.  Her daughter is not present on my exam to corroberate any of her report, though the patient seems to understand what happened to some degree.  On asking her why she had to come to the hospital, she states, "I passed out while my daughter was bathing me.  I was in the tub."  She states that in the immediate period before or after this that she felt fine with no fever, chills, nausea, vomiting, headache, chest pain, shortness of breath, or dizziness.  Currently she has no complaints at all other that severe displeasure at having to stay in the hospital.  The patient states that she was in the bathtub and did not strike her head when she had LOC.      After discussion with the patient's daughter on arrival to the ED, "Per daughter, she was bathing the mother when suddenly she became unresponsive for a few minutes. She responded to verbal stimuli and tap one her back and was as at " "baseline prior to arriving at the hospital. Per the daughter, she was speaking non-sense last night and usually that is a sign the patient has a UTI. However the patient herself is denying any dysuria, hematuria, melena, chest pain, SOB."     Past Medical History:   Diagnosis Date    Dementia     Difficult intubation     Rheumatoid arthritis     follows with Dr. Dayron Babb     Right Sided     Thyroid disease     thyroidectomy 20 years ago       Past Surgical History:   Procedure Laterality Date     SECTION      EYE SURGERY      FRACTURE SURGERY      right femur with pin implants 2013    HERNIA REPAIR      HYSTERECTOMY      THYROIDECTOMY         Review of patient's allergies indicates:   Allergen Reactions    Tramadol Other (See Comments)     Hallucinations; psychosis       No current facility-administered medications on file prior to encounter.      Current Outpatient Prescriptions on File Prior to Encounter   Medication Sig    cholecalciferol, vitamin D3, (VITAMIN D3) 5,000 unit Tab Take 10,000 Units by mouth once a week.    naproxen sodium (ANAPROX) 220 MG tablet Take 220 mg by mouth daily as needed (for pain).    DOXYLAMINE SUCCINATE (SLEEP AID, DOXYLAMINE, ORAL) Take 1 tablet by mouth nightly as needed (for sleep).    memantine (NAMENDA) 5 MG Tab TAKE 1 TABLET(5 MG) BY MOUTH EVERY DAY    predniSONE (DELTASONE) 1 MG tablet Take 5 tablets (5 mg total) by mouth once daily.     Family History     Problem Relation (Age of Onset)    Cancer Maternal Aunt        Social History Main Topics    Smoking status: Never Smoker    Smokeless tobacco: Not on file    Alcohol use No    Drug use: No    Sexual activity: Not Currently     Partners: Female     Birth control/ protection: Abstinence     Review of Systems   Constitutional: Negative for chills, fatigue and fever.   HENT: Negative for congestion.    Respiratory: Negative for cough and shortness of breath.    Cardiovascular: Negative " for chest pain.   Gastrointestinal: Negative for abdominal pain, constipation, diarrhea, nausea and vomiting.   Musculoskeletal: Positive for arthralgias.   Neurological: Positive for dizziness, syncope and light-headedness. Negative for weakness.   Psychiatric/Behavioral: Positive for behavioral problems, confusion, dysphoric mood and sleep disturbance. The patient is not nervous/anxious.      Objective:     Vital Signs (Most Recent):  Temp: 97.5 °F (36.4 °C) (05/20/17 1334)  Pulse: 90 (05/20/17 1931)  Resp: 14 (05/20/17 1931)  BP: (!) 151/68 (05/20/17 1931)  SpO2: 98 % (05/20/17 1931) Vital Signs (24h Range):  Temp:  [97.5 °F (36.4 °C)] 97.5 °F (36.4 °C)  Pulse:  [69-91] 90  Resp:  [13-22] 14  SpO2:  [82 %-100 %] 98 %  BP: (128-171)/(63-94) 151/68        There is no height or weight on file to calculate BMI.    Physical Exam   Constitutional: She appears well-developed and well-nourished.  Non-toxic appearance. She does not have a sickly appearance. She does not appear ill. No distress.   HENT:   Head: Normocephalic and atraumatic.   Mouth/Throat: Abnormal dentition. No oropharyngeal exudate.   Eyes: Conjunctivae and EOM are normal. Pupils are equal, round, and reactive to light. Right eye exhibits no discharge. Left eye exhibits no discharge. No scleral icterus.   Chronic senile changes   Neck: Normal range of motion. Neck supple. No JVD present. Carotid bruit is not present. No tracheal deviation present. No thyromegaly present.   Cardiovascular: Normal rate, normal heart sounds and intact distal pulses.  An irregular rhythm present. Exam reveals no gallop and no friction rub.    No murmur heard.  Pulmonary/Chest: Effort normal and breath sounds normal. No stridor. No tachypnea and no bradypnea. No respiratory distress. She has no decreased breath sounds. She has no wheezes. She has no rhonchi. She has no rales. She exhibits no tenderness.   Abdominal: Soft. Bowel sounds are normal. She exhibits no distension  "and no mass. There is no tenderness. There is no rebound and no guarding.   Genitourinary:   Genitourinary Comments: No taylor in place.     Musculoskeletal: Normal range of motion. She exhibits no edema or tenderness.   Lymphadenopathy:     She has no cervical adenopathy.   No peripheral edema   Neurological: She is alert. She is disoriented. She displays normal reflexes. No cranial nerve deficit or sensory deficit. She exhibits normal muscle tone. Coordination normal. GCS eye subscore is 4. GCS verbal subscore is 5. GCS motor subscore is 6.   Skin: Skin is warm and dry. No rash noted. She is not diaphoretic. No erythema. No pallor.   Chronic senile skin changes   Psychiatric: Judgment and thought content normal. Her affect is angry. Her speech is rapid and/or pressured and tangential. She is agitated. She is not actively hallucinating. Cognition and memory are impaired. She exhibits abnormal recent memory and abnormal remote memory.   Ms. Bah is pleasantly demented.  She is alert to person and place, but not time.  Initially she is angry and accusatory that someone, "has stolen my yellow blanket!"  After coaching and redirection, she is able to relax and cooperate with the exam. She is attentive.   Nursing note and vitals reviewed.      Significant Labs:   Recent Results (from the past 24 hour(s))   Protime-INR    Collection Time: 05/20/17  2:29 PM   Result Value Ref Range    Prothrombin Time 12.2 9.0 - 12.5 sec    INR 1.2 0.8 - 1.2   CBC auto differential    Collection Time: 05/20/17  2:29 PM   Result Value Ref Range    WBC 6.22 3.90 - 12.70 K/uL    RBC 4.10 4.00 - 5.40 M/uL    Hemoglobin 12.0 12.0 - 16.0 g/dL    Hematocrit 35.8 (L) 37.0 - 48.5 %    MCV 87 82 - 98 fL    MCH 29.3 27.0 - 31.0 pg    MCHC 33.5 32.0 - 36.0 %    RDW 16.2 (H) 11.5 - 14.5 %    Platelets 227 150 - 350 K/uL    MPV 10.3 9.2 - 12.9 fL    Gran # 4.7 1.8 - 7.7 K/uL    Lymph # 1.0 1.0 - 4.8 K/uL    Mono # 0.5 0.3 - 1.0 K/uL    Eos # 0.0 0.0 - " 0.5 K/uL    Baso # 0.01 0.00 - 0.20 K/uL    Gran% 75.1 (H) 38.0 - 73.0 %    Lymph% 16.4 (L) 18.0 - 48.0 %    Mono% 7.4 4.0 - 15.0 %    Eosinophil% 0.6 0.0 - 8.0 %    Basophil% 0.2 0.0 - 1.9 %    Differential Method Automated    Comprehensive metabolic panel    Collection Time: 05/20/17  2:29 PM   Result Value Ref Range    Sodium 141 136 - 145 mmol/L    Potassium 3.8 3.5 - 5.1 mmol/L    Chloride 102 95 - 110 mmol/L    CO2 27 23 - 29 mmol/L    Glucose 158 (H) 70 - 110 mg/dL    BUN, Bld 7 (L) 8 - 23 mg/dL    Creatinine 0.6 0.5 - 1.4 mg/dL    Calcium 8.6 (L) 8.7 - 10.5 mg/dL    Total Protein 7.8 6.0 - 8.4 g/dL    Albumin 2.4 (L) 3.5 - 5.2 g/dL    Total Bilirubin 0.9 0.1 - 1.0 mg/dL    Alkaline Phosphatase 113 55 - 135 U/L    AST 33 10 - 40 U/L    ALT 21 10 - 44 U/L    Anion Gap 12 8 - 16 mmol/L    eGFR if African American >60.0 >60 mL/min/1.73 m^2    eGFR if non African American >60.0 >60 mL/min/1.73 m^2   Brain natriuretic peptide    Collection Time: 05/20/17  2:29 PM   Result Value Ref Range     (H) 0 - 99 pg/mL   Troponin I    Collection Time: 05/20/17  2:29 PM   Result Value Ref Range    Troponin I 0.009 0.000 - 0.026 ng/mL   Magnesium    Collection Time: 05/20/17  2:29 PM   Result Value Ref Range    Magnesium 1.4 (L) 1.6 - 2.6 mg/dL   Phosphorus    Collection Time: 05/20/17  2:29 PM   Result Value Ref Range    Phosphorus 3.0 2.7 - 4.5 mg/dL   Urinalysis    Collection Time: 05/20/17  3:58 PM   Result Value Ref Range    Specimen UA Urine, Catheterized     Color, UA Yellow Yellow, Straw, Criselda    Appearance, UA Hazy (A) Clear    pH, UA 7.0 5.0 - 8.0    Specific Gravity, UA 1.010 1.005 - 1.030    Protein, UA Negative Negative    Glucose, UA Negative Negative    Ketones, UA Negative Negative    Bilirubin (UA) Negative Negative    Occult Blood UA Negative Negative    Nitrite, UA Negative Negative    Urobilinogen, UA 4.0 <2.0 EU/dL    Leukocytes, UA Negative Negative         Significant Imaging:   X-Ray Chest 1  View 2017 None Specified          RESULTS:  AP chest     Comparison: 16     Results: Chronic elevation of the left hemidiaphragm. The lungs are clear. The cardiac width is normal in size. No pneumothorax. Age-related degenerative changes of the osseous structures.  IMPRESSION:   No acute process seen. No change from the prior study.        Electronically signed by: LINH OHARA MD  Date:     17  Time:    15:15        Signed By: Linh Ohara MD on 2017 3:15 PM                   DSE 13  CONCLUSIONS     1 - Normal left ventricular systolic function (EF 55-60%).     2 - Left ventricular diastolic dysfunction.     3 - Normal right ventricular systolic function .   No evidence of stress induced myocardial ischemia.   This document has been electronically    SIGNED BY: Gilda Oliver MD On: 2013 13:15    EK-MAY-2017 14:15:58    Normal sinus rhythm, rate 97  Incomplete right bundle branch block  Left anterior fascicular block  Prolonged QT - QT/QTc 398/505 ms  Abnormal ECG  When compared with ECG of 2017 10:47,  Fusion complexes are no longer Present  Premature ventricular complexes are no longer Present    Old EKG 17  Vent. Rate : 117 BPM     Atrial Rate : 122 BPM     P-R Int : 158 ms          QRS Dur : 108 ms      QT Int : 322 ms       P-R-T Axes : 074 -83 069 degrees     QTc Int : 449 ms  Sinus tachycardia with occasional Premature ventricular complexes and   Fusion complexes  Biatrial enlargement  Left axis deviation  Pulmonary disease pattern  Incomplete right bundle branch block  Inferior infarct ,age undetermined  Abnormal ECG  Confirmed by Anca REYNOLDS, Alexa ([id number]) on 2017 5:15:02 PM    Assessment/Plan:     Syncope and collapse  -?Orthostatic hypotension ?Vasovagel event ?Arrhythmia ?Cardiac ischemia ?Valvular disease ?Seizure ?TIA/CVA  -Place on telemetry through the night  -Clinically she is asymptomatic right now  -There are no focal neurologic  "signs to suggest CVA  -IV fluids and check orthostatics in the am    Qt prolongation - QTc 505ms  -Check Mg level now  -Though rare, Namenda has been associated with worsening QT prolongation:   -"The risk for TdP increases when the QTc interval exceeds 500 ms or extends from the baseline by 60 ms;[1] therefore, the condition of this case was in a very dangerous state."    -Angy H., Elli Y. and Maricruz T. (2015), QT prolongation associated with memantine in Alzheimer's disease.     -Psychiatry Clin Neurosci, 69: 239-240. Doi:[id number]/pcn.38774  -Will stop Namenda and recheck EKG in am  -Place on telemetry   -Keep Mg>2, K>4    Hypomagnesemia  -MsSO4 2g iv now    Coronary artery disease, s/p MI  -Consider low dose of Coreg 3.125mg PO BID in am if rhythm and BP remain normal  -ASA 81mg po qday  -Serial cardiac enzymes and Trop I q6 x 3 total    Rheumatoid arthritis  -Follow up with Rheumatology  -Continue Prednisone    Alzheimer's dementia with behavioral disturbance  -Holding Namenda pending resolution of her QTc prolongation   -Also avoiding atypical antipsychotics and Haldol due to this reason as well    VTE Risk Mitigation         Ordered     Medium Risk of VTE  Once      05/20/17 1915     Place sequential compression device  Until discontinued      05/20/17 1915     Place ANABEL hose  Until discontinued      05/20/17 1915        LEONARD Lara MD  Department of Hospital Medicine   [facility]  "

## 2017-05-21 LAB
ANION GAP SERPL CALC-SCNC: 8 MMOL/L
AORTIC VALVE REGURGITATION: ABNORMAL
BACTERIA UR CULT: NO GROWTH
BASOPHILS # BLD AUTO: 0.01 K/UL
BASOPHILS NFR BLD: 0.2 %
BUN SERPL-MCNC: 7 MG/DL
CALCIUM SERPL-MCNC: 8.2 MG/DL
CHLORIDE SERPL-SCNC: 103 MMOL/L
CK MB SERPL-MCNC: 0.8 NG/ML
CK MB SERPL-RTO: 4.7 %
CK SERPL-CCNC: 17 U/L
CK SERPL-CCNC: 17 U/L
CO2 SERPL-SCNC: 31 MMOL/L
CREAT SERPL-MCNC: 0.5 MG/DL
DIASTOLIC DYSFUNCTION: YES
DIFFERENTIAL METHOD: ABNORMAL
EOSINOPHIL # BLD AUTO: 0.1 K/UL
EOSINOPHIL NFR BLD: 1.7 %
ERYTHROCYTE [DISTWIDTH] IN BLOOD BY AUTOMATED COUNT: 16.7 %
EST. GFR  (AFRICAN AMERICAN): >60 ML/MIN/1.73 M^2
EST. GFR  (NON AFRICAN AMERICAN): >60 ML/MIN/1.73 M^2
ESTIMATED PA SYSTOLIC PRESSURE: 39.69
GLUCOSE SERPL-MCNC: 73 MG/DL
HCT VFR BLD AUTO: 31.7 %
HGB BLD-MCNC: 10.5 G/DL
LYMPHOCYTES # BLD AUTO: 1.9 K/UL
LYMPHOCYTES NFR BLD: 35.6 %
MAGNESIUM SERPL-MCNC: 1.9 MG/DL
MCH RBC QN AUTO: 29.1 PG
MCHC RBC AUTO-ENTMCNC: 33.1 %
MCV RBC AUTO: 88 FL
MITRAL VALVE REGURGITATION: ABNORMAL
MONOCYTES # BLD AUTO: 0.8 K/UL
MONOCYTES NFR BLD: 15.3 %
NEUTROPHILS # BLD AUTO: 2.5 K/UL
NEUTROPHILS NFR BLD: 47 %
PHOSPHATE SERPL-MCNC: 2.8 MG/DL
PLATELET # BLD AUTO: 224 K/UL
PMV BLD AUTO: 10.4 FL
POTASSIUM SERPL-SCNC: 3.9 MMOL/L
RBC # BLD AUTO: 3.61 M/UL
RETIRED EF AND QEF - SEE NOTES: 60 (ref 55–65)
SODIUM SERPL-SCNC: 142 MMOL/L
TRICUSPID VALVE REGURGITATION: ABNORMAL
TROPONIN I SERPL DL<=0.01 NG/ML-MCNC: 0.01 NG/ML
TROPONIN I SERPL DL<=0.01 NG/ML-MCNC: 0.01 NG/ML
WBC # BLD AUTO: 5.36 K/UL

## 2017-05-21 PROCEDURE — 97530 THERAPEUTIC ACTIVITIES: CPT

## 2017-05-21 PROCEDURE — 36415 COLL VENOUS BLD VENIPUNCTURE: CPT

## 2017-05-21 PROCEDURE — 84100 ASSAY OF PHOSPHORUS: CPT

## 2017-05-21 PROCEDURE — 93010 ELECTROCARDIOGRAM REPORT: CPT | Mod: ,,, | Performed by: INTERNAL MEDICINE

## 2017-05-21 PROCEDURE — G8988 SELF CARE GOAL STATUS: HCPCS | Mod: CK

## 2017-05-21 PROCEDURE — 84484 ASSAY OF TROPONIN QUANT: CPT

## 2017-05-21 PROCEDURE — 25000003 PHARM REV CODE 250: Performed by: INTERNAL MEDICINE

## 2017-05-21 PROCEDURE — G8978 MOBILITY CURRENT STATUS: HCPCS | Mod: CM

## 2017-05-21 PROCEDURE — 25000003 PHARM REV CODE 250: Performed by: PHYSICIAN ASSISTANT

## 2017-05-21 PROCEDURE — 97162 PT EVAL MOD COMPLEX 30 MIN: CPT

## 2017-05-21 PROCEDURE — 93306 TTE W/DOPPLER COMPLETE: CPT

## 2017-05-21 PROCEDURE — 84484 ASSAY OF TROPONIN QUANT: CPT | Mod: 91

## 2017-05-21 PROCEDURE — 97165 OT EVAL LOW COMPLEX 30 MIN: CPT | Mod: 59

## 2017-05-21 PROCEDURE — 93005 ELECTROCARDIOGRAM TRACING: CPT

## 2017-05-21 PROCEDURE — 93306 TTE W/DOPPLER COMPLETE: CPT | Mod: 26,,, | Performed by: INTERNAL MEDICINE

## 2017-05-21 PROCEDURE — G8987 SELF CARE CURRENT STATUS: HCPCS | Mod: CL

## 2017-05-21 PROCEDURE — 80048 BASIC METABOLIC PNL TOTAL CA: CPT

## 2017-05-21 PROCEDURE — 99225 PR SUBSEQUENT OBSERVATION CARE,LEVEL II: CPT | Mod: ,,, | Performed by: PHYSICIAN ASSISTANT

## 2017-05-21 PROCEDURE — 83735 ASSAY OF MAGNESIUM: CPT

## 2017-05-21 PROCEDURE — G0378 HOSPITAL OBSERVATION PER HR: HCPCS

## 2017-05-21 PROCEDURE — 82553 CREATINE MB FRACTION: CPT

## 2017-05-21 PROCEDURE — G8979 MOBILITY GOAL STATUS: HCPCS | Mod: CL

## 2017-05-21 PROCEDURE — 85025 COMPLETE CBC W/AUTO DIFF WBC: CPT

## 2017-05-21 RX ORDER — POTASSIUM CHLORIDE 20 MEQ/1
20 TABLET, EXTENDED RELEASE ORAL ONCE
Status: DISCONTINUED | OUTPATIENT
Start: 2017-05-21 | End: 2017-05-22 | Stop reason: HOSPADM

## 2017-05-21 RX ORDER — CARVEDILOL 3.12 MG/1
3.12 TABLET ORAL 2 TIMES DAILY
Status: DISCONTINUED | OUTPATIENT
Start: 2017-05-21 | End: 2017-05-22 | Stop reason: HOSPADM

## 2017-05-21 RX ORDER — LANOLIN ALCOHOL/MO/W.PET/CERES
400 CREAM (GRAM) TOPICAL ONCE
Status: COMPLETED | OUTPATIENT
Start: 2017-05-21 | End: 2017-05-21

## 2017-05-21 RX ADMIN — MAGNESIUM OXIDE TAB 400 MG (241.3 MG ELEMENTAL MG) 400 MG: 400 (241.3 MG) TAB at 09:05

## 2017-05-21 RX ADMIN — ACETAMINOPHEN 500 MG: 500 TABLET, FILM COATED ORAL at 09:05

## 2017-05-21 NOTE — PROGRESS NOTES
"Pt refused 0000 labs. "them people not johnny stick me, call the police". Attempted to educate pt on importance of labs. Pt still refused lab draw.   "

## 2017-05-21 NOTE — HOSPITAL COURSE
Pt admitted to observation for further work up of syncopal episode. Troponin negative. EKG with prolonged QTc. CXR shows no acute process. UA unremarkable. Electrolytes replaced.  5/21: Blood culture NGTD. Urine culture pending. 2d CFD shows EF 60%, diastolic dysfunction, and wall motion abnormalities. Repeat EKG with continued prolonged QTc. Spoke with cardiology who recommended continued medical management with daily ASA and coreg 3.125 mg BID as tolerated. Awaiting CT head. PT/OT recommending HHC vs SNF.  5/22: Blood culture NGTD. Urine culture no growth. CT head unremarkable. Pt and family wish to proceed with HHC. Pt discharged home with coreg 3.125 mg BID and daily ASA. Pt to follow upw with PCP as an outpatient.

## 2017-05-21 NOTE — ASSESSMENT & PLAN NOTE
- On admit, troponin negative. EKG with prolonged QTc.   5/21: 2d CFD shows EF 60%, diastolic dysfunction, and wall motion abnormalities. Repeat EKG with continued prolonged QTc. Spoke with cardiology who recommended continued medical management with daily ASA and coreg 3.125 mg BID as tolerated.

## 2017-05-21 NOTE — ASSESSMENT & PLAN NOTE
-?Orthostatic hypotension ?Vasovagel event ?Arrhythmia ?Cardiac ischemia ?Valvular disease ?Seizure ?TIA/CVA  -Place on telemetry through the night  -Clinically she is asymptomatic right now  -There are no focal neurologic signs to suggest CVA  -IV fluids and check orthostatics in the am  5/21: Blood culture NGTD. Urine culture pending. Awaiting CT head. PT/OT recommending [facility].

## 2017-05-21 NOTE — PT/OT/SLP EVAL
Occupational Therapy  Evaluation/Treatment    Kim Bah   MRN: [id number]   Admitting Diagnosis: Syncope and collapse    OT Date of Treatment: 17   OT Start Time: 1157  OT Stop Time: 1219  OT Total Time (min): 22 min    Billable Minutes:  Evaluation 10  Therapeutic Activity 12    Diagnosis: Syncope and collapse       Past Medical History:   Diagnosis Date    Coronary artery disease involving native coronary artery of native heart without angina pectoris 2017    Dementia     Difficult intubation     Rheumatoid arthritis     follows with Dr. Dayron Babb     Right Sided     Thyroid disease     thyroidectomy 20 years ago      Past Surgical History:   Procedure Laterality Date     SECTION      EYE SURGERY      FRACTURE SURGERY      right femur with pin implants     HERNIA REPAIR      HYSTERECTOMY      THYROIDECTOMY         Referring physician:  MD Marco  Date referred to OT:  17    General Precautions: Standard, fall  Orthopedic Precautions: N/A  Braces: N/A    Do you have any cultural, spiritual, Rastafarian conflicts, given your current situation?: none stated     Patient History:  Living Environment  Lives With: child(bambi), adult  Living Arrangements: house  Home Accessibility: stairs to enter home  Number of Stairs to Enter Home: 1  Transportation Available: family or friend will provide  Living Environment Comment: Pt's daughter reports pt lives with her in a 2 story home with 1 FANY.  Pt's bedroom is on the 1st floor with half bath available.  PTA, pt's daughter reports that pt requires A with all ADL's and ambulates with a rollator.  Pt's daughter states she gives pt sponge baths and helps with feeding at times.  Pt is reported to be incotinent.   Equipment Currently Used at Home: hospital bed, rollator, wheelchair    Prior level of function:   Bed Mobility/Transfers: needs device and assist  Grooming: needs assist  Bathing: needs device and assist  Upper Body  Dressing: needs assist  Lower Body Dressing: needs assist  Toileting: needs assist  Homemaking Responsibilities: No  Driving License: No     Dominant hand: right    Subjective:  Communicated with RN prior to session.  Pt agreeable to evaluation and treatment   Chief Complaint: none   Patient/Family stated goals: to get stronger    Pain Ratin/10         Pain Rating Post-Intervention: 0/10    Objective:  Patient found with: telemetry    Cognitive Exam:  Oriented to: Person  Follows Commands/attention: Follows one-step commands  Communication: clear/fluent  Memory:  Impaired STM and Impaired LTM  Safety awareness/insight to disability: impaired  Coping skills/emotional control: Appropriate to situation    Visual/perceptual:  Intact    Physical Exam:  Postural examination/scapula alignment: Rounded shoulder  Skin integrity: Visible skin intact  Edema: None noted BUE    Sensation:   Intact    Upper Extremity Range of Motion:  Right Upper Extremity: not formally assessed due to cognition, but appears functional for daily activities, noted RA in hand with deformity of IP joints  Left Upper Extremity: not formally assessed due to cognition, but appears functional for daily activities, noted RA in hand with deformity of IP joints    Upper Extremity Strength:  Right Upper Extremity: unable to formally assess due to cognition, but appears functional for daily activities.  Left Upper Extremity:  unable to formally assess due to cognition, but appears functional for daily activities.   Strength: unable to assess due to cognition    Fine motor coordination:   Intact    Gross motor coordination: WFL    Functional Mobility:  Bed Mobility:  Supine to Sit: Moderate Assistance, Minimum Assistance  Sit to Supine: Moderate Assistance    Transfers: pt refused to stand       Functional Ambulation: did not occur    Activities of Daily Living:    LE Dressing Level of Assistance: Total assistance to don non skid socks      Balance:  "  Static Sit: POOR: Needs MODERATE assist to maintain, posterior leaning initially with Mod A needed  Dynamic Sit: FAIR: Cannot move trunk without losing balance  Static Stand: did not occur  Dynamic stand: did not occur    Therapeutic Activities and Exercises:  - Pt and her daughter were provided ed on role of OT and POC.   - Pt participated in static sit EOB to increase trunk strength and balance for 8 minutes.      AM-PAC 6 CLICK ADL  How much help from another person does this patient currently need?  1 = Unable, Total/Dependent Assistance  2 = A lot, Maximum/Moderate Assistance  3 = A little, Minimum/Contact Guard/Supervision  4 = None, Modified [place]/Independent    Putting on and taking off regular lower body clothing? : 2  Bathing (including washing, rinsing, drying)?: 2  Toileting, which includes using toilet, bedpan, or urinal? : 1  Putting on and taking off regular upper body clothing?: 2  Taking care of personal grooming such as brushing teeth?: 2  Eating meals?: 3  Total Score: 12    AM-PAC Raw Score CMS "G-Code Modifier Level of Impairment Assistance   6 % Total / Unable   7 - 9 CM 80 - 100% Maximal Assist   10 - 14 CL 60 - 80% Moderate Assist   15 - 19 CK 40 - 60% Moderate Assist   20 - 22 CJ 20 - 40% Minimal Assist   23 CI 1-20% SBA / CGA   24 CH 0% Independent/ Mod I       Patient left HOB elevated with all lines intact, call button in reach, RN  notified and daughter present    Assessment:  Kim Bah is a 85 y.o. female with a medical diagnosis of Syncope and collapse.  She presents with weakness and decreased endurance which impacts performance and safety with ADL's and functional t/f's.  Pt requires Mod A with bed mobility and Total A to don socks.  She exhibits poor balance in sitting with posterior lean. Pt would benefit from acute OT services to maximize functional performance with daily activities.    Rehab identified problem list/impairments: Rehab identified problem " list/impairments: weakness, impaired endurance, impaired self care skills, impaired functional mobilty, impaired balance, impaired cognition, decreased safety awareness    Rehab potential is good.    Activity tolerance: Good    Discharge recommendations: Discharge Facility/Level Of Care Needs: home with home health, home health OT     Barriers to discharge:      Equipment recommendations: walker, rolling     GOALS:    Occupational Therapy Goals     Not on file                PLAN:  Patient to be seen 3 x/week to address the above listed problems via self-care/home management, therapeutic activities, therapeutic exercises  Plan of Care expires: 06/20/17  Plan of Care reviewed with: patient, daughter    OT G-codes  Functional Assessment Tool Used: [facility]  Score: 12  Functional Limitation: Self care  Self Care Current Status (): CL  Self Care Goal Status (): CAMILLA Rocha  05/21/2017

## 2017-05-21 NOTE — NURSING TRANSFER
Nursing Transfer Note      Report received from Ashley raza lpn. Care assumed. Pt arrived to obs 11 via stretcher. Safety maintained. Pt lying in bed. Eyes open. Respirations unlabored. Pt alert awake orientated to self and place. Assessment performed per admit assessment. Pt orientated to room and call bell. Bed alarm on. No distress noted.

## 2017-05-21 NOTE — ASSESSMENT & PLAN NOTE
-Though rare, Namenda has been associated with worsening QT prolongation. Per daughter, patient was not yet started Namenda due to insurance issues  -Continous telemetry   -Keep Mg>2, K>4

## 2017-05-21 NOTE — SUBJECTIVE & OBJECTIVE
Interval History: Pt had no acute events overnight. This AM, pt sitting upright in bed with daughter at bedside. Pt is alert and oriented, pleasant, and partakes in conversation. Pt has no complaints at this time.    Review of Systems   Constitutional: Negative for appetite change, chills, diaphoresis, fatigue and fever.   Respiratory: Negative for cough, chest tightness, shortness of breath and wheezing.    Cardiovascular: Negative for chest pain, palpitations and leg swelling.   Gastrointestinal: Negative for abdominal distention, abdominal pain, diarrhea, nausea and vomiting.   Musculoskeletal: Positive for arthralgias and gait problem. Negative for back pain and neck pain.   Neurological: Positive for syncope and weakness. Negative for dizziness, seizures, facial asymmetry, speech difficulty and headaches.   Psychiatric/Behavioral: Positive for confusion. Negative for agitation, behavioral problems and dysphoric mood. The patient is not nervous/anxious.      Objective:     Vital Signs (Most Recent):  Temp: 98.2 °F (36.8 °C) (05/21/17 0800)  Pulse: 90 (05/21/17 1500)  Resp: 18 (05/21/17 0800)  BP: (!) 154/79 (05/21/17 0800)  SpO2: 95 % (05/21/17 0800) Vital Signs (24h Range):  Temp:  [98.2 °F (36.8 °C)-98.3 °F (36.8 °C)] 98.2 °F (36.8 °C)  Pulse:  [] 90  Resp:  [13-22] 18  SpO2:  [82 %-100 %] 95 %  BP: (121-171)/(63-92) 154/79        There is no height or weight on file to calculate BMI.  No intake or output data in the 24 hours ending 05/21/17 3183   Physical Exam   Constitutional: She is oriented to person, place, and time. She appears well-developed and well-nourished. No distress.   HENT:   Mouth/Throat: Abnormal dentition.   Cardiovascular: Normal rate, regular rhythm, normal heart sounds and intact distal pulses.    No murmur heard.  Pulmonary/Chest: Effort normal and breath sounds normal. No respiratory distress. She has no wheezes.   Abdominal: Soft. Bowel sounds are normal. She exhibits no  distension. There is no tenderness.   Neurological: She is alert and oriented to person, place, and time. No cranial nerve deficit.   Skin: Skin is warm and dry. She is not diaphoretic. No erythema.   Psychiatric: She has a normal mood and affect. Her behavior is normal.   Nursing note and vitals reviewed.      Significant Labs: All pertinent labs within the past 24 hours have been reviewed.    Significant Imaging: I have reviewed all pertinent imaging results/findings within the past 24 hours.

## 2017-05-21 NOTE — HPI
"Ms. Bah is an 88yo lady with a past medical history of UTI, advanced alzheimer's dementia for 12 years, rheumatoid arthritis, postsurgical hypothyroidism and CAD with MI in 2012.  She is followed by Dr. Pompa here in  Neurology.  She is cared for by her daughter.  It has been well noted that recently her PO intake and appetite have been declining with her cognitive deficits, usually requiring her daughter having to push her to eat and drink.       She now comes to the ED after a brief loss of consciousness.  Her daughter is not present on my exam to corroberate any of her report, though the patient seems to understand what happened to some degree.  On asking her why she had to come to the hospital, she states, "I passed out while my daughter was bathing me.  I was in the tub."  She states that in the immediate period before or after this that she felt fine with no fever, chills, nausea, vomiting, headache, chest pain, shortness of breath, or dizziness.  Currently she has no complaints at all other that severe displeasure at having to stay in the hospital.  The patient states that she was in the bathtub and did not strike her head when she had LOC.       After discussion with the patient's daughter on arrival to the ED, "Per daughter, she was bathing the mother when suddenly she became unresponsive for a few minutes. She responded to verbal stimuli and tap one her back and was as at baseline prior to arriving at the hospital. Per the daughter, she was speaking non-sense last night and usually that is a sign the patient has a UTI. However the patient herself is denying any dysuria, hematuria, melena, chest pain, SOB."  "

## 2017-05-21 NOTE — PLAN OF CARE
Problem: Physical Therapy Goal  Goal: Physical Therapy Goal  Goals to be met by: 17     Patient will increase functional independence with mobility by performin. Supine to sit with Moderate Assistance.  2. Sit to supine with Moderate Assistance.  3. Sit to stand transfer with Moderate Assistance.  4. Bed to chair transfer with Moderate Assistance using Rolling Walker.  5. Gait  x 40 feet with Moderate Assistance using Rolling Walker.   6. Ascend/descend 1 stair with bilateral Handrails Moderate Assistance.   7. Sitting at edge of bed x 2 minutes with Minimal Assistance.  8. Lower extremity exercise program x 20 reps per handout, with assistance as needed.    Outcome: Ongoing (interventions implemented as appropriate)  PT goals established.

## 2017-05-21 NOTE — PROGRESS NOTES
"[facility] Hospital Medicine  Progress Note    Patient Name: Kim Bah  MRN: [id number]  Patient Class: OP- Observation   Admission Date: 5/20/2017  Length of Stay: 0 days  Attending Physician: Maggie Gale MD  Primary Care Provider: Pavel Power MD    [facility] Medicine Team: [facility] Radha Layne PA-C    Subjective:     Principal Problem:Syncope and collapse    HPI:  Ms. Bah is an 89yo lady with a past medical history of UTI, advanced alzheimer's dementia for 12 years, rheumatoid arthritis, postsurgical hypothyroidism and CAD with MI in 2012.  She is followed by Dr. Pompa here in  Neurology.  She is cared for by her daughter.  It has been well noted that recently her PO intake and appetite have been declining with her cognitive deficits, usually requiring her daughter having to push her to eat and drink.       She now comes to the ED after a brief loss of consciousness.  Her daughter is not present on my exam to corroberate any of her report, though the patient seems to understand what happened to some degree.  On asking her why she had to come to the hospital, she states, "I passed out while my daughter was bathing me.  I was in the tub."  She states that in the immediate period before or after this that she felt fine with no fever, chills, nausea, vomiting, headache, chest pain, shortness of breath, or dizziness.  Currently she has no complaints at all other that severe displeasure at having to stay in the hospital.  The patient states that she was in the bathtub and did not strike her head when she had LOC.       After discussion with the patient's daughter on arrival to the ED, "Per daughter, she was bathing the mother when suddenly she became unresponsive for a few minutes. She responded to verbal stimuli and tap one her back and was as at baseline prior to arriving at the hospital. Per the daughter, she was speaking non-sense last night and usually that is a sign " "the patient has a UTI. However the patient herself is denying any dysuria, hematuria, melena, chest pain, SOB."    Hospital Course:  Pt admitted to observation for further work up of syncopal episode. Troponin negative. EKG with prolonged QTc. CXR shows no acute process. UA unremarkable. Electrolytes replaced.  5/21: Blood culture NGTD. Urine culture pending. 2d CFD shows EF 60%, diastolic dysfunction, and wall motion abnormalities. Repeat EKG with continued prolonged QTc. Spoke with cardiology who recommended continued medical management with daily ASA and coreg 3.125 mg BID as tolerated. Awaiting CT head. PT/OT recommending [facility].    Interval History: Pt had no acute events overnight. This AM, pt sitting upright in bed with daughter at bedside. Pt is alert and oriented, pleasant, and partakes in conversation. Pt has no complaints at this time.    Review of Systems   Constitutional: Negative for appetite change, chills, diaphoresis, fatigue and fever.   Respiratory: Negative for cough, chest tightness, shortness of breath and wheezing.    Cardiovascular: Negative for chest pain, palpitations and leg swelling.   Gastrointestinal: Negative for abdominal distention, abdominal pain, diarrhea, nausea and vomiting.   Musculoskeletal: Positive for arthralgias and gait problem. Negative for back pain and neck pain.   Neurological: Positive for syncope and weakness. Negative for dizziness, seizures, facial asymmetry, speech difficulty and headaches.   Psychiatric/Behavioral: Positive for confusion. Negative for agitation, behavioral problems and dysphoric mood. The patient is not nervous/anxious.      Objective:     Vital Signs (Most Recent):  Temp: 98.2 °F (36.8 °C) (05/21/17 0800)  Pulse: 90 (05/21/17 1500)  Resp: 18 (05/21/17 0800)  BP: (!) 154/79 (05/21/17 0800)  SpO2: 95 % (05/21/17 0800) Vital Signs (24h Range):  Temp:  [98.2 °F (36.8 °C)-98.3 °F (36.8 °C)] 98.2 °F (36.8 °C)  Pulse:  [] 90  Resp:  [13-22] " 18  SpO2:  [82 %-100 %] 95 %  BP: (121-171)/(63-92) 154/79        There is no height or weight on file to calculate BMI.  No intake or output data in the 24 hours ending 05/21/17 3293   Physical Exam   Constitutional: She is oriented to person, place, and time. She appears well-developed and well-nourished. No distress.   HENT:   Mouth/Throat: Abnormal dentition.   Cardiovascular: Normal rate, regular rhythm, normal heart sounds and intact distal pulses.    No murmur heard.  Pulmonary/Chest: Effort normal and breath sounds normal. No respiratory distress. She has no wheezes.   Abdominal: Soft. Bowel sounds are normal. She exhibits no distension. There is no tenderness.   Neurological: She is alert and oriented to person, place, and time. No cranial nerve deficit.   Skin: Skin is warm and dry. She is not diaphoretic. No erythema.   Psychiatric: She has a normal mood and affect. Her behavior is normal.   Nursing note and vitals reviewed.      Significant Labs: All pertinent labs within the past 24 hours have been reviewed.    Significant Imaging: I have reviewed all pertinent imaging results/findings within the past 24 hours.    Assessment/Plan:      * Syncope and collapse    -?Orthostatic hypotension ?Vasovagel event ?Arrhythmia ?Cardiac ischemia ?Valvular disease ?Seizure ?TIA/CVA  -Place on telemetry through the night  -Clinically she is asymptomatic right now  -There are no focal neurologic signs to suggest CVA  -IV fluids and check orthostatics in the am  5/21: Blood culture NGTD. Urine culture pending. Awaiting CT head. PT/OT recommending SNF vs HHC.        Prolonged Q-T interval on ECG    -Though rare, Namenda has been associated with worsening QT prolongation. Per daughter, patient was not yet started Namenda due to insurance issues  -Continous telemetry   -Keep Mg>2, K>4          Hypomagnesemia    - Mg 1.4 on admit; gave MsSO4 2g IV  - Mg 1.4->1.9          Coronary artery disease involving native coronary  artery of native heart without angina pectoris    - On admit, troponin negative. EKG with prolonged QTc.   5/21: 2d CFD shows EF 60%, diastolic dysfunction, and wall motion abnormalities. Repeat EKG with continued prolonged QTc. Spoke with cardiology who recommended continued medical management with daily ASA and coreg 3.125 mg BID as tolerated.         Late onset Alzheimer's disease with behavioral disturbance    - Avoid atypical antipsychotics and Haldol 2/2 prolonged QT        Rheumatoid arthritis involving both wrists with positive rheumatoid factor    - Per jud ace not longer taking prednisone  - F/u with rheum as an outpatient            VTE Risk Mitigation         Ordered     enoxaparin injection 40 mg  Every 24 hours (non-standard times)     Route:  Subcutaneous        05/20/17 2226     Medium Risk of VTE  Once      05/20/17 1915     Place sequential compression device  Until discontinued      05/20/17 1915     Place ANABEL hose  Until discontinued      05/20/17 1915          Radha Layne PA-C  Department of Hospital Medicine   [facility]

## 2017-05-21 NOTE — PLAN OF CARE
Problem: Occupational Therapy Goal  Goal: Occupational Therapy Goal  Goals to be met by: 5/28/17    Patient will increase functional independence with ADLs by performing:    UE Dressing with Contact Guard Assistance.  LE Dressing with Maximum Assistance.  Grooming while seated with Contact Guard Assistance.  Sitting at edge of bed x10 minutes with Contact Guard Assistance.  Supine to sit with Minimal Assistance.  Stand pivot transfers with Minimal Assistance.    OT evaluation completed.  POC established.  OT recommends home with HHOT upon d/c.  DME of [facility]  5/21/2017

## 2017-05-21 NOTE — PT/OT/SLP EVAL
Physical Therapy  Evaluation    Kim Bah   MRN: [id number]   Admitting Diagnosis: Syncope and collapse    PT Received On: 17  PT Start Time: 1059     PT Stop Time: 1122    PT Total Time (min): 23 min       Billable Minutes:  Evaluation 14 and Therapeutic Activity 9    Diagnosis: Syncope and collapse      Past Medical History:   Diagnosis Date    Coronary artery disease involving native coronary artery of native heart without angina pectoris 2017    Dementia     Difficult intubation     Rheumatoid arthritis     follows with Dr. Dayron Babb     Right Sided     Thyroid disease     thyroidectomy 20 years ago      Past Surgical History:   Procedure Laterality Date     SECTION      EYE SURGERY      FRACTURE SURGERY      right femur with pin implants 2013    HERNIA REPAIR      HYSTERECTOMY      THYROIDECTOMY         Referring physician: DEVAUGHN Lara MD   Date referred to PT: 17    General Precautions: Standard, aspiration, fall  Orthopedic Precautions: N/A   Braces: N/A       Do you have any cultural, spiritual, Christianity conflicts, given your current situation?: none    Patient History:  Lives With: child(bambi), adult  Living Arrangements: house  Home Accessibility: stairs to enter home  Home Layout: Able to live on 1st floor  Number of Stairs to Enter Home: 1  Transportation Available: none  Living Environment Comment: Pts grandson is able to provide A for pt during the day.  Grandson works night hours, while daughter works during the day and provides A at night.   Equipment Currently Used at Home: rollator, wheelchair, bedside commode, shower chair, grab bar, hospital bed  DME owned (not currently used): rolling walker, bedside commode, shower chair, wheelchair and hospital bed    Previous Level of Function:  Ambulation Skills: needs device and assist (Pt uses w/c, according to pts daughter, 2-3 weeks ago pt was using rollator for mobility)  Transfer Skills: needs  assist  ADL Skills: needs assist  Work/Leisure Activity: unable to perform (Pt does not drive)    Subjective:  Communicated with nursing prior to session.    Chief Complaint: No complaints at this time  Patient goals: Pts daughter's goal is to return home with pt    Pain Ratin/10               Pain Rating Post-Intervention: 0/10    Objective:   Patient found with: peripheral IV, telemetry     Cognitive Exam:  Oriented to: Person, Place and Situation    Follows Commands/attention: Inattentive and Follows one-step commands  Communication: soft  Safety awareness/insight to disability: impaired    Physical Exam:  Postural examination/scapula alignment: No postural abnormalities identified    Skin integrity: Thin and Dry  Edema: Moderate     Sensation:   Impaired  light/touch L LE    Upper Extremity Range of Motion:  Right Upper Extremity: WFL  Left Upper Extremity: WFL    Upper Extremity Strength:  Right Upper Extremity: 3+/5 throughout - diff testing sec to cognitive deficits  Left Upper Extremity: 3+/5 throughout  - diff testing sec to cognitive deficits    Lower Extremity Range of Motion:  Right Lower Extremity: WFL  Left Lower Extremity: WFL    Lower Extremity Strength:  Right Lower Extremity: 3+/5 throughout  - diff testing sec to cognitive deficits  Left Lower Extremity: 3+/5 throughout  - diff testing sec to cognitive deficits     Fine motor coordination:  Impaired  Left hand, finger to nose   and Right hand, finger to nose      Gross motor coordination: WFL    Functional Mobility:  Bed Mobility:  Rolling/Turning to Left: Minimum assistance (with use of bed rails.  )  Scooting/Bridging: Minimum Assistance (Keshawn to scoot to HOB, with stabilization of B LEs in hooklying to facilitate bridging.  Keshawn to scoot to EOB in sit with A to stabilize trunk from post lean.  With use of bed rails each time.)  Supine to Sit: Maximum Assistance  Sit to Supine: Maximum Assistance    Transfers:  Sit <> Stand Assistance:  (Pt  refused to perf)    Gait:   Gait Distance: Pt refused to perf    Balance:   Static Sit: POOR+: Needs MINIMAL assist to maintain sec to post lean  Dynamic Sit: FAIR+: Maintains balance through MINIMAL excursions of active trunk motion during scooting  Static Stand: 0: N/A  Dynamic stand: POOR: N/A    Therapeutic Activities and Exercises:  Pt and daughter ed on pressure relief in bed and in sit.  Ed on frequency and how to perf.  Pt perf rolling to L and propped with pillows    AM-PAC 6 CLICK MOBILITY  How much help from another person does this patient currently need?   1 = Unable, Total/Dependent Assistance  2 = A lot, Maximum/Moderate Assistance  3 = A little, Minimum/Contact Guard/Supervision  4 = None, Modified [place]/Independent    Turning over in bed (including adjusting bedclothes, sheets and blankets)?: 3  Sitting down on and standing up from a chair with arms (e.g., wheelchair, bedside commode, etc.): 1  Moving from lying on back to sitting on the side of the bed?: 2  Moving to and from a bed to a chair (including a wheelchair)?: 1  Need to walk in hospital room?: 1  Climbing 3-5 steps with a railing?: 1  Total Score: 9     AM-PAC Raw Score CMS G-Code Modifier Level of Impairment Assistance   6 % Total / Unable   7 - 9 CM 80 - 100% Maximal Assist   10 - 14 CL 60 - 80% Moderate Assist   15 - 19 CK 40 - 60% Moderate Assist   20 - 22 CJ 20 - 40% Minimal Assist   23 CI 1-20% SBA / CGA   24 CH 0% Independent/ Mod I     Patient left supine with all lines intact, call button in reach, nursing notified and daughter present.    Assessment:   Kim Bah is a 85 y.o. female with a medical diagnosis of Syncope and collapse and presents with impairments in functional mobility and UE/LE mm strength.  Pt also presents with cognitive deficits that are adversely affecting the patients ability to actively participate in ADLs and functional transfers.  Pts daughter stated that there has been a recent decline  in pts function, within the past 2-3 weeks, during which the patient has been unable to amb in the home.  Pt requires repair to w/c, sec to brakes not working.  Pts daughter also stated that the pts hospital bed is too high for her to enter.  Pt has a low tolerance to PT tx session.  Pt will benefit from skilled PT services to address the below impairments.      Rehab identified problem list/impairments: Rehab identified problem list/impairments: weakness, decreased safety awareness, impaired endurance, impaired cardiopulmonary response to activity, impaired self care skills, impaired functional mobilty, impaired sensation, gait instability, impaired balance, impaired coordination, impaired fine motor, impaired skin, edema, decreased ROM, impaired cognition    Rehab potential is poor.    Activity tolerance: Poor    Discharge recommendations:   SNF - if the pts family agrees, Home Health PT    Barriers to discharge: Barriers to Discharge: None    Equipment recommendations: Equipment Needed After Discharge: none     GOALS:    Physical Therapy Goals        Problem: Physical Therapy Goal    Goal Priority Disciplines Outcome Goal Variances Interventions   Physical Therapy Goal     PT/OT, PT Ongoing (interventions implemented as appropriate)     Description:  Goals to be met by: 17     Patient will increase functional independence with mobility by performin. Supine to sit with Moderate Assistance.  2. Sit to supine with Moderate Assistance.  3. Sit to stand transfer with Moderate Assistance.  4. Bed to chair transfer with Moderate Assistance using Rolling Walker.  5. Gait  x 40 feet with Moderate Assistance using Rolling Walker.   6. Ascend/descend 1 stair with bilateral Handrails Moderate Assistance.   7. Sitting at edge of bed x 2 minutes with Minimal Assistance.  8. Lower extremity exercise program x 20 reps per handout, with assistance as needed.                      PLAN:    Patient to be seen 5 x/week to  address the above listed problems via gait training, therapeutic activities, therapeutic exercises, neuromuscular re-education  Plan of Care expires: 06/21/17  Plan of Care reviewed with: patient, daughter          Yesika Soares, PT  05/21/2017

## 2017-05-22 ENCOUNTER — TELEPHONE (OUTPATIENT)
Dept: INTERNAL MEDICINE | Facility: CLINIC | Age: 82
End: 2017-05-22

## 2017-05-22 VITALS
OXYGEN SATURATION: 95 % | TEMPERATURE: 98 F | HEART RATE: 74 BPM | DIASTOLIC BLOOD PRESSURE: 79 MMHG | RESPIRATION RATE: 18 BRPM | SYSTOLIC BLOOD PRESSURE: 123 MMHG

## 2017-05-22 LAB
ALBUMIN SERPL BCP-MCNC: 1.9 G/DL
ALP SERPL-CCNC: 88 U/L
ALT SERPL W/O P-5'-P-CCNC: 14 U/L
ANION GAP SERPL CALC-SCNC: 7 MMOL/L
AST SERPL-CCNC: 23 U/L
BASOPHILS # BLD AUTO: 0.01 K/UL
BASOPHILS NFR BLD: 0.2 %
BILIRUB SERPL-MCNC: 0.6 MG/DL
BUN SERPL-MCNC: 6 MG/DL
CALCIUM SERPL-MCNC: 7.9 MG/DL
CHLORIDE SERPL-SCNC: 104 MMOL/L
CO2 SERPL-SCNC: 28 MMOL/L
CREAT SERPL-MCNC: 0.5 MG/DL
DIFFERENTIAL METHOD: ABNORMAL
EOSINOPHIL # BLD AUTO: 0.1 K/UL
EOSINOPHIL NFR BLD: 2.6 %
ERYTHROCYTE [DISTWIDTH] IN BLOOD BY AUTOMATED COUNT: 16.8 %
EST. GFR  (AFRICAN AMERICAN): >60 ML/MIN/1.73 M^2
EST. GFR  (NON AFRICAN AMERICAN): >60 ML/MIN/1.73 M^2
GLUCOSE SERPL-MCNC: 75 MG/DL
HCT VFR BLD AUTO: 30.7 %
HGB BLD-MCNC: 10.1 G/DL
LYMPHOCYTES # BLD AUTO: 2 K/UL
LYMPHOCYTES NFR BLD: 41.2 %
MAGNESIUM SERPL-MCNC: 1.7 MG/DL
MCH RBC QN AUTO: 29 PG
MCHC RBC AUTO-ENTMCNC: 32.9 %
MCV RBC AUTO: 88 FL
MONOCYTES # BLD AUTO: 0.8 K/UL
MONOCYTES NFR BLD: 15.2 %
NEUTROPHILS # BLD AUTO: 2 K/UL
NEUTROPHILS NFR BLD: 40.8 %
PHOSPHATE SERPL-MCNC: 2.6 MG/DL
PLATELET # BLD AUTO: 228 K/UL
PMV BLD AUTO: 10.3 FL
POTASSIUM SERPL-SCNC: 3.6 MMOL/L
PROT SERPL-MCNC: 6.3 G/DL
RBC # BLD AUTO: 3.48 M/UL
SODIUM SERPL-SCNC: 139 MMOL/L
WBC # BLD AUTO: 4.93 K/UL

## 2017-05-22 PROCEDURE — 36415 COLL VENOUS BLD VENIPUNCTURE: CPT

## 2017-05-22 PROCEDURE — 99217 PR OBSERVATION CARE DISCHARGE: CPT | Mod: ,,, | Performed by: PHYSICIAN ASSISTANT

## 2017-05-22 PROCEDURE — 80053 COMPREHEN METABOLIC PANEL: CPT

## 2017-05-22 PROCEDURE — 84100 ASSAY OF PHOSPHORUS: CPT

## 2017-05-22 PROCEDURE — G0378 HOSPITAL OBSERVATION PER HR: HCPCS

## 2017-05-22 PROCEDURE — 83735 ASSAY OF MAGNESIUM: CPT

## 2017-05-22 PROCEDURE — 25000003 PHARM REV CODE 250: Performed by: PHYSICIAN ASSISTANT

## 2017-05-22 PROCEDURE — 85025 COMPLETE CBC W/AUTO DIFF WBC: CPT

## 2017-05-22 PROCEDURE — G8980 MOBILITY D/C STATUS: HCPCS | Mod: CM

## 2017-05-22 RX ORDER — CARVEDILOL 3.12 MG/1
3.12 TABLET ORAL 2 TIMES DAILY
Qty: 120 TABLET | Refills: 0 | Status: ON HOLD | OUTPATIENT
Start: 2017-05-22 | End: 2017-06-29 | Stop reason: HOSPADM

## 2017-05-22 RX ORDER — ASPIRIN 81 MG/1
81 TABLET ORAL DAILY
Refills: 0 | COMMUNITY
Start: 2017-05-22 | End: 2018-05-22

## 2017-05-22 RX ADMIN — CARVEDILOL 3.12 MG: 3.12 TABLET, FILM COATED ORAL at 09:05

## 2017-05-22 NOTE — PLAN OF CARE
Discussed d/c recommendation for snf per therapy with patient's daughter. She is not interested in snf placement and asks if home health is an option. Explained services that home health provides and she requests aid and  PT and nurse. She requests w/c for home. HH services will be provided through [facility]. Will fax orders when available.

## 2017-05-22 NOTE — PLAN OF CARE
Problem: Patient Care Overview  Goal: Plan of Care Review  Pt with no falls or injuries this shift. Pt afebrile, no s/s infection. Pt skin intact. No skin breakdown this hospital stay.

## 2017-05-22 NOTE — TELEPHONE ENCOUNTER
----- Message from Lilly Harvey RN sent at 5/22/2017  1:14 PM CDT -----  Requesting hospital follow up appointment in 1-2 weeks.  thanks

## 2017-05-22 NOTE — PLAN OF CARE
[facility]    HOME HEALTH ORDERS  FACE TO FACE ENCOUNTER    Patient Name: Kim Bah  YOB: 1932    PCP: Pavel Power MD   PCP Address: [facility] [address] [facility] [address]  PCP Phone Number: [phone]  PCP Fax: [phone]    Encounter Date: 05/22/2017    Admit to Home Health    Diagnoses:  Active Hospital Problems    Diagnosis  POA    *Syncope and collapse [R55]  Yes     Priority: 1 - High    Prolonged Q-T interval on ECG [R94.31]  Yes     Priority: 2     Hypomagnesemia [E83.42]  Yes     Priority: 3     Coronary artery disease involving native coronary artery of native heart without angina pectoris [I25.10]  Yes     Priority: 4     Late onset Alzheimer's disease with behavioral disturbance [G30.1, F02.81]  Yes     Priority: 5     Rheumatoid arthritis involving both wrists with positive rheumatoid factor [M05.731, M05.732]  Yes     Priority: 6      Chronic      Resolved Hospital Problems    Diagnosis Date Resolved POA    Loss of consciousness [R40.20] 05/20/2017 Yes       No future appointments.  Follow-up Information     Pavel Power MD In 2 weeks.    Specialty:  Internal Medicine  Why:  hospital follow up  Contact information:  [facility]  [facility] [id number] [phone]                     I have seen and examined this patient face to face today. My clinical findings that support the need for the home health skilled services and home bound status are the following:  Weakness/numbness causing balance and gait disturbance due to Weakness/Debility making it taxing to leave home.    Allergies:  Review of patient's allergies indicates:   Allergen Reactions    Tramadol Other (See Comments)     Hallucinations; psychosis       Diet: cardiac diet    Activities: activity as tolerated    Nursing:   SN to complete comprehensive assessment including routine vital signs. Instruct on disease process and s/s of complications to report to MD.  Review/verify medication list sent home with the patient at time of discharge  and instruct patient/caregiver as needed. Frequency may be adjusted depending on start of care date.    Notify MD if SBP > 160 or < 90; DBP > 90 or < 50; HR > 120 or < 50; Temp > 101      CONSULTS:    Physical Therapy to evaluate and treat. Evaluate for home safety and equipment needs; Establish/upgrade home exercise program. Perform / instruct on therapeutic exercises, gait training, transfer training, and Range of Motion.  Aide to provide assistance with personal care, ADLs, and vital signs.    MISCELLANEOUS CARE:  N/A    WOUND CARE ORDERS  n/a      Medications: Review discharge medications with patient and family and provide education.      Current Discharge Medication List      START taking these medications    Details   aspirin (ECOTRIN) 81 MG EC tablet Take 1 tablet (81 mg total) by mouth once daily.  Refills: 0      carvedilol (COREG) 3.125 MG tablet Take 1 tablet (3.125 mg total) by mouth 2 (two) times daily.  Qty: 120 tablet, Refills: 0         CONTINUE these medications which have NOT CHANGED    Details   cholecalciferol, vitamin D3, (VITAMIN D3) 5,000 unit Tab Take 10,000 Units by mouth once a week.      naproxen sodium (ANAPROX) 220 MG tablet Take 220 mg by mouth daily as needed (for pain).      DOXYLAMINE SUCCINATE (SLEEP AID, DOXYLAMINE, ORAL) Take 1 tablet by mouth nightly as needed (for sleep).      memantine (NAMENDA) 5 MG Tab TAKE 1 TABLET(5 MG) BY MOUTH EVERY DAY  Qty: 30 tablet, Refills: 0    Comments: **Patient requests 90 days supply**  Associated Diagnoses: Cognitive impairment      predniSONE (DELTASONE) 1 MG tablet Take 5 tablets (5 mg total) by mouth once daily.  Qty: 30 tablet, Refills: 0             I certify that this patient is confined to her home and needs physical therapy.      Radah Layne PA-C  Department of Hospital Medicine

## 2017-05-22 NOTE — PROGRESS NOTES
"Transport arrived to transport pt to CT scan. Pt refused to go, "yall not going to practice on me, yall can get the hell out of here". Paged md on call to notify of refusal of CT scan.  "

## 2017-05-22 NOTE — ASSESSMENT & PLAN NOTE
-?Orthostatic hypotension ?Vasovagel event ?Arrhythmia ?Cardiac ischemia ?Valvular disease ?Seizure ?TIA/CVA  -Place on telemetry through the night  -Clinically she is asymptomatic right now  -There are no focal neurologic signs to suggest CVA  -IV fluids and check orthostatics in the am  5/21: Blood culture NGTD. Urine culture pending. Awaiting CT head. PT/OT recommending HHC.  5/22: Blood culture NGTD. Urine culture no growth. CT head unremarkable. Pt and family wish to proceed with HHC. Pt discharged home with coreg 3.125 mg BID and daily ASA. Pt to follow upw with PCP as an outpatient.

## 2017-05-22 NOTE — ASSESSMENT & PLAN NOTE
- On admit, troponin negative. EKG with prolonged QTc.   5/21: 2d CFD shows EF 60%, diastolic dysfunction, and wall motion abnormalities. Repeat EKG with continued prolonged QTc. Spoke with cardiology who recommended continued medical management with daily ASA and coreg 3.125 mg BID as tolerated.   5/22: Pt discharged home with coreg 3.125 mg BID and daily ASA. Pt to follow up with PCP as an outpatient.

## 2017-05-22 NOTE — DISCHARGE SUMMARY
"[facility] Medicine  Discharge Summary      Patient Name: Kim Bah  MRN: [id number]  Admission Date: 5/20/2017  Hospital Length of Stay: 0 days  Discharge Date and Time:  05/22/2017 10:49 AM  Attending Physician: Maggie Gale MD   Discharging Provider: Radha Layne PA-C  Primary Care Provider: Pavel Power MD  [facility] Medicine Team: [facility] MED F Radha Layne PA-C    HPI:   Ms. Bah is an 89yo lady with a past medical history of UTI, advanced alzheimer's dementia for 12 years, rheumatoid arthritis, postsurgical hypothyroidism and CAD with MI in 2012.  She is followed by Dr. Pompa here in  Neurology.  She is cared for by her daughter.  It has been well noted that recently her PO intake and appetite have been declining with her cognitive deficits, usually requiring her daughter having to push her to eat and drink.       She now comes to the ED after a brief loss of consciousness.  Her daughter is not present on my exam to corroberate any of her report, though the patient seems to understand what happened to some degree.  On asking her why she had to come to the hospital, she states, "I passed out while my daughter was bathing me.  I was in the tub."  She states that in the immediate period before or after this that she felt fine with no fever, chills, nausea, vomiting, headache, chest pain, shortness of breath, or dizziness.  Currently she has no complaints at all other that severe displeasure at having to stay in the hospital.  The patient states that she was in the bathtub and did not strike her head when she had LOC.       After discussion with the patient's daughter on arrival to the ED, "Per daughter, she was bathing the mother when suddenly she became unresponsive for a few minutes. She responded to verbal stimuli and tap one her back and was as at baseline prior to arriving at the hospital. Per the daughter, she was speaking non-sense last night and usually " "that is a sign the patient has a UTI. However the patient herself is denying any dysuria, hematuria, melena, chest pain, SOB."    * No surgery found *      Indwelling Lines/Drains at time of discharge:   Lines/Drains/Airways          No matching active lines, drains, or airways        Hospital Course:   Pt admitted to observation for further work up of syncopal episode. Troponin negative. EKG with prolonged QTc. CXR shows no acute process. UA unremarkable. Electrolytes replaced.  5/21: Blood culture NGTD. Urine culture pending. 2d CFD shows EF 60%, diastolic dysfunction, and wall motion abnormalities. Repeat EKG with continued prolonged QTc. Spoke with cardiology who recommended continued medical management with daily ASA and coreg 3.125 mg BID as tolerated. Awaiting CT head. PT/OT recommending HHC vs SNF.  5/22: Blood culture NGTD. Urine culture no growth. CT head unremarkable. Pt and family wish to proceed with HHC. Pt discharged home with coreg 3.125 mg BID and daily ASA. Pt to follow upw with PCP as an outpatient.      Significant Diagnostic Studies: Microbiology:   Blood Culture   Lab Results   Component Value Date    LABBLOO No Growth to date 05/20/2017    LABBLOO No Growth to date 05/20/2017    and Urine Culture    Lab Results   Component Value Date    LABURIN No growth 05/20/2017     Radiology: X-Ray: CXR: X-Ray Chest 1 View (CXR):   Results for orders placed or performed during the hospital encounter of 05/20/17   X-Ray Chest 1 View    Narrative    AP chest    Comparison: 8/31/16    Results: Chronic elevation of the left hemidiaphragm.  The lungs are clear.  The cardiac width is normal in size.  No pneumothorax.  Age-related degenerative changes of the osseous structures.    Impression     No acute process seen.  No change from the prior study.      Electronically signed by: LINH OHARA MD  Date:     05/20/17  Time:    15:15      CT scan: Head    Pending Diagnostic Studies:     None        Final " Active Diagnoses:    Diagnosis Date Noted POA    PRINCIPAL PROBLEM:  Syncope and collapse [R55] 05/20/2017 Yes    Prolonged Q-T interval on ECG [R94.31] 05/20/2017 Yes    Hypomagnesemia [E83.42] 05/20/2017 Yes    Coronary artery disease involving native coronary artery of native heart without angina pectoris [I25.10] 05/20/2017 Yes    Late onset Alzheimer's disease with behavioral disturbance [G30.1, F02.81] 05/20/2017 Yes    Rheumatoid arthritis involving both wrists with positive rheumatoid factor [M05.731, M05.732] 06/23/2013 Yes     Chronic      Problems Resolved During this Admission:    Diagnosis Date Noted Date Resolved POA    Loss of consciousness [R40.20] 05/20/2017 05/20/2017 Yes      * Syncope and collapse    -?Orthostatic hypotension ?Vasovagel event ?Arrhythmia ?Cardiac ischemia ?Valvular disease ?Seizure ?TIA/CVA  -Place on telemetry through the night  -Clinically she is asymptomatic right now  -There are no focal neurologic signs to suggest CVA  -IV fluids and check orthostatics in the am  5/21: Blood culture NGTD. Urine culture pending. Awaiting CT head. PT/OT recommending HHC.  5/22: Blood culture NGTD. Urine culture no growth. CT head unremarkable. Pt and family wish to proceed with HHC. Pt discharged home with coreg 3.125 mg BID and daily ASA. Pt to follow upw with PCP as an outpatient.         Prolonged Q-T interval on ECG    -Though rare, Namenda has been associated with worsening QT prolongation. Per daughter, patient was not yet started Namenda due to insurance issues  -Continous telemetry   -Keep Mg>2, K>4          Hypomagnesemia    - Mg 1.4 on admit; gave MsSO4 2g IV  - Mg 1.4->1.9          Coronary artery disease involving native coronary artery of native heart without angina pectoris    - On admit, troponin negative. EKG with prolonged QTc.   5/21: 2d CFD shows EF 60%, diastolic dysfunction, and wall motion abnormalities. Repeat EKG with continued prolonged QTc. Spoke with  "cardiology who recommended continued medical management with daily ASA and coreg 3.125 mg BID as tolerated.   5/22: Pt discharged home with coreg 3.125 mg BID and daily ASA. Pt to follow up with PCP as an outpatient.         Late onset Alzheimer's disease with behavioral disturbance    - Avoid atypical antipsychotics and Haldol 2/2 prolonged QT        Rheumatoid arthritis involving both wrists with positive rheumatoid factor    - Per db, pt not longer taking prednisone  - F/u with rheum as an outpatient              Discharged Condition: stable    Disposition: Home or Self Care    Follow Up:  Follow-up Information     Pavel Power MD In 2 weeks.    Specialty:  Internal Medicine  Why:  hospital follow up  Contact information:  [address]  [facility] [id number] [phone]                 Patient Instructions:     WHEELCHAIR FOR HOME USE   Order Comments: D/c home today   Order Specific Question Answer Comments   Hours in W/C per day: 3    Type of Wheelchair: Standard    Size(Width): 18"(STD adult)    Leg Support: STD footrests    Lap Belt: Velcro    Cushion: Basic    Height: 5'    Weight: 120lb    Does patient have medical equipment at home? rollator    Does patient have medical equipment at home? wheelchair    Does patient have medical equipment at home? bedside commode    Does patient have medical equipment at home? shower chair    Does patient have medical equipment at home? grab bar    Does patient have medical equipment at home? hospital bed    Length of need (1-99 months): 99    Please check all that apply: Caregiver is capable and willing to operate wheelchair safely.      Diet general     Activity as tolerated     Call MD for:  temperature >100.4     Call MD for:  severe uncontrolled pain     Call MD for:  redness, tenderness, or signs of infection (pain, swelling, redness, odor or green/yellow discharge around incision site)     Call MD for:  increased confusion or weakness "       Medications:  Reconciled Home Medications:   Current Discharge Medication List      START taking these medications    Details   aspirin (ECOTRIN) 81 MG EC tablet Take 1 tablet (81 mg total) by mouth once daily.  Refills: 0      carvedilol (COREG) 3.125 MG tablet Take 1 tablet (3.125 mg total) by mouth 2 (two) times daily.  Qty: 120 tablet, Refills: 0         CONTINUE these medications which have NOT CHANGED    Details   cholecalciferol, vitamin D3, (VITAMIN D3) 5,000 unit Tab Take 10,000 Units by mouth once a week.      naproxen sodium (ANAPROX) 220 MG tablet Take 220 mg by mouth daily as needed (for pain).      DOXYLAMINE SUCCINATE (SLEEP AID, DOXYLAMINE, ORAL) Take 1 tablet by mouth nightly as needed (for sleep).      memantine (NAMENDA) 5 MG Tab TAKE 1 TABLET(5 MG) BY MOUTH EVERY DAY  Qty: 30 tablet, Refills: 0    Comments: **Patient requests 90 days supply**  Associated Diagnoses: Cognitive impairment      predniSONE (DELTASONE) 1 MG tablet Take 5 tablets (5 mg total) by mouth once daily.  Qty: 30 tablet, Refills: 0           Time spent on the discharge of patient: 30 minutes    Radha Layne PA-C  Department of Hospital Medicine  [facility]

## 2017-05-22 NOTE — NURSING
Order to discharge home today. Saline lock removed. VSS. Discharge instructions given to pt and daughter. Daughter verbalizes understanding. Pt discharged via w/c. Pt accompanied by transport associate and daughter. All personal belongings taken home by daughter.

## 2017-05-22 NOTE — PLAN OF CARE
Referral for Home health sent to [facility] via [facility]. Called Annmarie at [facility] and wheelchair will be delivered to patient's home.

## 2017-05-22 NOTE — PLAN OF CARE
05/22/2017      Kim Bah  [address] [facility] [address]          Hospital Medicine Dept.  [facility] [address] [place] [id number] [phone] after hours  [phone] fax Kim Bah has been hospitalized at the [facility] since 5/20/2017.  Please excuse from duties.      Please contact me if you have any questions.                  __________________________  Radha Layne PA-C  05/22/2017

## 2017-05-23 NOTE — PT/OT/SLP DISCHARGE
Physical Therapy Discharge Summary    Kim Bah  MRN: [id number]   Syncope and collapse   Patient Discharged from acute Physical Therapy on 17.  Please refer to prior PT noted date on 17 for functional status.     Assessment:   Patient appropriate for care in another setting.  GOALS:    Physical Therapy Goals        Problem: Physical Therapy Goal    Goal Priority Disciplines Outcome Goal Variances Interventions   Physical Therapy Goal     PT/OT, PT Ongoing (interventions implemented as appropriate)     Description:  Goals to be met by: 17     Patient will increase functional independence with mobility by performin. Supine to sit with Moderate Assistance.  2. Sit to supine with Moderate Assistance.  3. Sit to stand transfer with Moderate Assistance.  4. Bed to chair transfer with Moderate Assistance using Rolling Walker.  5. Gait  x 40 feet with Moderate Assistance using Rolling Walker.   6. Ascend/descend 1 stair with bilateral Handrails Moderate Assistance.   7. Sitting at edge of bed x 2 minutes with Minimal Assistance.  8. Lower extremity exercise program x 20 reps per handout, with assistance as needed.                    Reasons for Discontinuation of Therapy Services  Transfer to alternate level of care.      Plan:  Patient Discharged to: Home with Home Health Service.    Yesika Soares, PT  2017

## 2017-05-23 NOTE — PT/OT/SLP DISCHARGE
Occupational Therapy Discharge Summary    Kim Bah  MRN: [id number]   Syncope and collapse   Patient Discharged from acute Occupational Therapy on 5/22/17.  Please refer to prior OT note dated on 5/21/17 for functional status.     Assessment:   Patient was discharge unexpectedly.  Information required to complete and accurate discharge summary is unknown.  Refer to therapy initial evaluation and last progress note for initial and most recent functional status and goal achievement.  Recommendations made may be found in medical record.  GOALS:    Occupational Therapy Goals        Problem: Occupational Therapy Goal    Goal Priority Disciplines Outcome Interventions   Occupational Therapy Goal     OT, PT/OT     Description:  Goals to be met by: 5/28/17    Patient will increase functional independence with ADLs by performing:    UE Dressing with Contact Guard Assistance.  LE Dressing with Maximum Assistance.  Grooming while seated with Contact Guard Assistance.  Sitting at edge of bed x10 minutes with Contact Guard Assistance.  Supine to sit with Minimal Assistance.  Stand pivot transfers with Minimal Assistance.                    Reasons for Discontinuation of Therapy Services  Transfer to alternate level of care.      Plan:  Patient Discharged to: Home with Home Health Service   CAMILLA Lara  5/23/2017  .

## 2017-05-25 LAB
BACTERIA BLD CULT: NORMAL
BACTERIA BLD CULT: NORMAL

## 2017-06-05 ENCOUNTER — TELEPHONE (OUTPATIENT)
Dept: INTERNAL MEDICINE | Facility: CLINIC | Age: 82
End: 2017-06-05

## 2017-06-05 NOTE — TELEPHONE ENCOUNTER
----- Message from Lo Hardy sent at 6/5/2017 10:04 AM CDT -----  Contact: Joe with [facility]  X   1st Request  _  2nd Request  _  3rd Request        Who: Joe with [facility]    Why: Joe faxed an order to see if the clinical team would be in agreement  with getting the pt in the Advanced Illness Management program that is free of charge for the pt. Joe is requesting the form be reviewed,signed and faxed back. Please call with any questions,thanks!     What Number to Call Back: [phone](fax) [phone](phone)    When to Expect a call back: (Before the end of the day)   -- if the call is after 12:00, the call back will be tomorrow.

## 2017-06-07 ENCOUNTER — TELEPHONE (OUTPATIENT)
Dept: INTERNAL MEDICINE | Facility: CLINIC | Age: 82
End: 2017-06-07

## 2017-06-12 ENCOUNTER — TELEPHONE (OUTPATIENT)
Dept: INTERNAL MEDICINE | Facility: CLINIC | Age: 82
End: 2017-06-12

## 2017-06-12 NOTE — TELEPHONE ENCOUNTER
----- Message from Nereyda Toledo sent at 6/12/2017  1:50 PM CDT -----  _  1st Request  _  2nd Request  _  3rd Request        Who: Chrissy from [facility]    Why: she is calling and checking on the status of the doctor order on advance care management. Please call thank you    What Number to Call Back:[phone]  When to Expect a call back: (Before the end of the day)   -- if the call is after 12:00, the call back will be tomorrow.

## 2017-06-20 ENCOUNTER — TELEPHONE (OUTPATIENT)
Dept: INTERNAL MEDICINE | Facility: CLINIC | Age: 82
End: 2017-06-20

## 2017-06-20 NOTE — TELEPHONE ENCOUNTER
----- Message from Arsenio Garcia sent at 6/20/2017  1:34 PM CDT -----  Contact: Lachelle, [facility]  X_  1st Request  _  2nd Request  _  3rd Request        Who: Lachelle, [facility]    Why: Patient is having increased tremors, knee pain, and patient is screaming episodes. Please call back to follow up.    What Number to Call Back: [phone]    When to Expect a call back: (Before the end of the day)   -- if the call is after 12:00, the call back will be tomorrow.

## 2017-06-20 NOTE — TELEPHONE ENCOUNTER
----- Message from Arsenio Garcia sent at 6/20/2017  1:34 PM CDT -----  Contact: Lachelle, [facility]  X_  1st Request  _  2nd Request  _  3rd Request        Who: Lachelle, [facility]    Why: Patient is having increased tremors, knee pain, and patient is screaming episodes. Please call back to follow up.    What Number to Call Back: [phone]    When to Expect a call back: (Before the end of the day)   -- if the call is after 12:00, the call back will be tomorrow.

## 2017-06-20 NOTE — TELEPHONE ENCOUNTER
Lachelle with [facility] is stating that pt is having increased tremors knee pain and screaming episodes.Please advise

## 2017-06-21 NOTE — TELEPHONE ENCOUNTER
If these symptoms persist please have patient come in for an appointment so we can determine what is going on and what is the best treatment.  Given the screaming and the tremors and her h/o Alzheimer's she may also consider seeing her neurologist.

## 2017-06-21 NOTE — TELEPHONE ENCOUNTER
Spoke with Alicia patient daughter to inform her of PCP advise. She will call and make an appt with neuro.

## 2017-06-22 ENCOUNTER — TELEPHONE (OUTPATIENT)
Dept: INTERNAL MEDICINE | Facility: CLINIC | Age: 82
End: 2017-06-22

## 2017-06-22 NOTE — TELEPHONE ENCOUNTER
Spoke with Lachelle from  and she states that pt is impacted she did remove some of the stool but pt still has some left. She is askiing for either a order for a enema or stool softners. Please advise.

## 2017-06-22 NOTE — TELEPHONE ENCOUNTER
----- Message from Lo Hardy sent at 6/22/2017  9:53 AM CDT -----  Contact: Lachelle with [facility]  X  1st Request  _  2nd Request  _  3rd Request        Who: Lachelle with [facility]    Why: Lachelle states the pt may need a stool softener or an enema. Lachelle states she needs an order. Please call with any questions,thanks!    What Number to Call Back: [phone]    When to Expect a call back: (Before the end of the day)   -- if the call is after 12:00, the call back will be tomorrow.

## 2017-06-23 NOTE — TELEPHONE ENCOUNTER
Confirm with Lachelle from  that it is ok to give a Fleets Eneema every oter day PRN. Lachelle confirm and verbalize.

## 2017-06-27 ENCOUNTER — HOSPITAL ENCOUNTER (INPATIENT)
Facility: HOSPITAL | Age: 82
LOS: 2 days | Discharge: HOME HEALTH | DRG: 057 | End: 2017-06-30
Attending: EMERGENCY MEDICINE | Admitting: INTERNAL MEDICINE
Payer: MEDICARE

## 2017-06-27 DIAGNOSIS — K59.04 CHRONIC IDIOPATHIC CONSTIPATION: ICD-10-CM

## 2017-06-27 DIAGNOSIS — M05.732 RHEUMATOID ARTHRITIS INVOLVING BOTH WRISTS WITH POSITIVE RHEUMATOID FACTOR: Chronic | ICD-10-CM

## 2017-06-27 DIAGNOSIS — E16.1 FASTING HYPOGLYCEMIA: ICD-10-CM

## 2017-06-27 DIAGNOSIS — I25.10 CORONARY ARTERY DISEASE INVOLVING NATIVE CORONARY ARTERY OF NATIVE HEART WITHOUT ANGINA PECTORIS: ICD-10-CM

## 2017-06-27 DIAGNOSIS — E46 HYPOALBUMINEMIA DUE TO PROTEIN-CALORIE MALNUTRITION: ICD-10-CM

## 2017-06-27 DIAGNOSIS — R41.82 ALTERED MENTAL STATUS: Primary | ICD-10-CM

## 2017-06-27 DIAGNOSIS — F02.818 LATE ONSET ALZHEIMER'S DISEASE WITH BEHAVIORAL DISTURBANCE: Chronic | ICD-10-CM

## 2017-06-27 DIAGNOSIS — D63.8 ANEMIA OF CHRONIC DISORDER: Chronic | ICD-10-CM

## 2017-06-27 DIAGNOSIS — E83.42 HYPOMAGNESEMIA: ICD-10-CM

## 2017-06-27 DIAGNOSIS — G30.1 LATE ONSET ALZHEIMER'S DISEASE WITH BEHAVIORAL DISTURBANCE: Chronic | ICD-10-CM

## 2017-06-27 DIAGNOSIS — I45.5 SINUS PAUSE: ICD-10-CM

## 2017-06-27 DIAGNOSIS — M05.731 RHEUMATOID ARTHRITIS INVOLVING BOTH WRISTS WITH POSITIVE RHEUMATOID FACTOR: Chronic | ICD-10-CM

## 2017-06-27 DIAGNOSIS — G93.40 ENCEPHALOPATHY ACUTE: ICD-10-CM

## 2017-06-27 DIAGNOSIS — E83.51 HYPOCALCEMIA: ICD-10-CM

## 2017-06-27 DIAGNOSIS — E88.09 HYPOALBUMINEMIA DUE TO PROTEIN-CALORIE MALNUTRITION: ICD-10-CM

## 2017-06-27 LAB
ALBUMIN SERPL BCP-MCNC: 1.9 G/DL
ALP SERPL-CCNC: 98 U/L
ALT SERPL W/O P-5'-P-CCNC: 13 U/L
AMMONIA PLAS-SCNC: 29 UMOL/L
ANION GAP SERPL CALC-SCNC: 8 MMOL/L
AST SERPL-CCNC: 28 U/L
BASOPHILS # BLD AUTO: 0.01 K/UL
BASOPHILS NFR BLD: 0.2 %
BILIRUB SERPL-MCNC: 0.5 MG/DL
BILIRUB UR QL STRIP: NEGATIVE
BUN SERPL-MCNC: 10 MG/DL
CALCIUM SERPL-MCNC: 8.2 MG/DL
CHLORIDE SERPL-SCNC: 102 MMOL/L
CLARITY UR REFRACT.AUTO: CLEAR
CO2 SERPL-SCNC: 28 MMOL/L
COLOR UR AUTO: YELLOW
CREAT SERPL-MCNC: 0.6 MG/DL
DIFFERENTIAL METHOD: ABNORMAL
EOSINOPHIL # BLD AUTO: 0.1 K/UL
EOSINOPHIL NFR BLD: 1.2 %
ERYTHROCYTE [DISTWIDTH] IN BLOOD BY AUTOMATED COUNT: 15.3 %
EST. GFR  (AFRICAN AMERICAN): >60 ML/MIN/1.73 M^2
EST. GFR  (NON AFRICAN AMERICAN): >60 ML/MIN/1.73 M^2
GLUCOSE SERPL-MCNC: 94 MG/DL
GLUCOSE UR QL STRIP: NEGATIVE
HCT VFR BLD AUTO: 32.5 %
HGB BLD-MCNC: 11 G/DL
HGB UR QL STRIP: NEGATIVE
KETONES UR QL STRIP: NEGATIVE
LEUKOCYTE ESTERASE UR QL STRIP: NEGATIVE
LYMPHOCYTES # BLD AUTO: 1.6 K/UL
LYMPHOCYTES NFR BLD: 37.8 %
MAGNESIUM SERPL-MCNC: 1.2 MG/DL
MCH RBC QN AUTO: 29.2 PG
MCHC RBC AUTO-ENTMCNC: 33.8 %
MCV RBC AUTO: 86 FL
MONOCYTES # BLD AUTO: 0.6 K/UL
MONOCYTES NFR BLD: 14.8 %
NEUTROPHILS # BLD AUTO: 1.9 K/UL
NEUTROPHILS NFR BLD: 45.8 %
NITRITE UR QL STRIP: NEGATIVE
PH UR STRIP: 6 [PH] (ref 5–8)
PHOSPHATE SERPL-MCNC: 2.8 MG/DL
PLATELET # BLD AUTO: 207 K/UL
PMV BLD AUTO: 9.5 FL
POCT GLUCOSE: 84 MG/DL (ref 70–110)
POCT GLUCOSE: 86 MG/DL (ref 70–110)
POTASSIUM SERPL-SCNC: 3.5 MMOL/L
PROT SERPL-MCNC: 6.9 G/DL
PROT UR QL STRIP: NEGATIVE
RBC # BLD AUTO: 3.77 M/UL
SODIUM SERPL-SCNC: 138 MMOL/L
SP GR UR STRIP: 1.01 (ref 1–1.03)
TSH SERPL DL<=0.005 MIU/L-ACNC: 0.56 UIU/ML
URN SPEC COLLECT METH UR: ABNORMAL
UROBILINOGEN UR STRIP-ACNC: ABNORMAL EU/DL
WBC # BLD AUTO: 4.18 K/UL

## 2017-06-27 PROCEDURE — 82962 GLUCOSE BLOOD TEST: CPT

## 2017-06-27 PROCEDURE — 93005 ELECTROCARDIOGRAM TRACING: CPT

## 2017-06-27 PROCEDURE — 99215 OFFICE O/P EST HI 40 MIN: CPT | Mod: GC,,, | Performed by: PSYCHIATRY & NEUROLOGY

## 2017-06-27 PROCEDURE — 25000003 PHARM REV CODE 250: Performed by: INTERNAL MEDICINE

## 2017-06-27 PROCEDURE — 80053 COMPREHEN METABOLIC PANEL: CPT

## 2017-06-27 PROCEDURE — G0378 HOSPITAL OBSERVATION PER HR: HCPCS

## 2017-06-27 PROCEDURE — 82140 ASSAY OF AMMONIA: CPT

## 2017-06-27 PROCEDURE — 63600175 PHARM REV CODE 636 W HCPCS: Performed by: INTERNAL MEDICINE

## 2017-06-27 PROCEDURE — 99285 EMERGENCY DEPT VISIT HI MDM: CPT | Mod: ,,, | Performed by: PHYSICIAN ASSISTANT

## 2017-06-27 PROCEDURE — 84443 ASSAY THYROID STIM HORMONE: CPT

## 2017-06-27 PROCEDURE — 96372 THER/PROPH/DIAG INJ SC/IM: CPT

## 2017-06-27 PROCEDURE — 99285 EMERGENCY DEPT VISIT HI MDM: CPT | Mod: 25

## 2017-06-27 PROCEDURE — 99223 1ST HOSP IP/OBS HIGH 75: CPT | Mod: ,,, | Performed by: INTERNAL MEDICINE

## 2017-06-27 PROCEDURE — 84100 ASSAY OF PHOSPHORUS: CPT

## 2017-06-27 PROCEDURE — 81003 URINALYSIS AUTO W/O SCOPE: CPT

## 2017-06-27 PROCEDURE — 93010 ELECTROCARDIOGRAM REPORT: CPT | Mod: S$GLB,,, | Performed by: INTERNAL MEDICINE

## 2017-06-27 PROCEDURE — 85025 COMPLETE CBC W/AUTO DIFF WBC: CPT

## 2017-06-27 PROCEDURE — 63600175 PHARM REV CODE 636 W HCPCS: Performed by: PHYSICIAN ASSISTANT

## 2017-06-27 PROCEDURE — 83735 ASSAY OF MAGNESIUM: CPT

## 2017-06-27 PROCEDURE — 25000003 PHARM REV CODE 250: Performed by: NURSE PRACTITIONER

## 2017-06-27 RX ORDER — DIAZEPAM 10 MG/2ML
5 INJECTION INTRAMUSCULAR
Status: COMPLETED | OUTPATIENT
Start: 2017-06-27 | End: 2017-06-27

## 2017-06-27 RX ORDER — IBUPROFEN 200 MG
24 TABLET ORAL
Status: DISCONTINUED | OUTPATIENT
Start: 2017-06-27 | End: 2017-06-30 | Stop reason: HOSPADM

## 2017-06-27 RX ORDER — CARVEDILOL 3.12 MG/1
3.12 TABLET ORAL 2 TIMES DAILY
Status: DISCONTINUED | OUTPATIENT
Start: 2017-06-27 | End: 2017-06-29

## 2017-06-27 RX ORDER — MAGNESIUM SULFATE HEPTAHYDRATE 40 MG/ML
4 INJECTION, SOLUTION INTRAVENOUS ONCE
Status: COMPLETED | OUTPATIENT
Start: 2017-06-27 | End: 2017-06-27

## 2017-06-27 RX ORDER — OLANZAPINE 10 MG/2ML
2.5 INJECTION, POWDER, FOR SOLUTION INTRAMUSCULAR EVERY 8 HOURS PRN
Status: DISCONTINUED | OUTPATIENT
Start: 2017-06-27 | End: 2017-06-27

## 2017-06-27 RX ORDER — ACETAMINOPHEN 325 MG/1
650 TABLET ORAL EVERY 8 HOURS PRN
Status: DISCONTINUED | OUTPATIENT
Start: 2017-06-27 | End: 2017-06-27

## 2017-06-27 RX ORDER — PSEUDOEPHEDRINE/ACETAMINOPHEN 30MG-500MG
100 TABLET ORAL
Status: COMPLETED | OUTPATIENT
Start: 2017-06-27 | End: 2017-06-28

## 2017-06-27 RX ORDER — GLYCERIN 1 G/1
1 SUPPOSITORY RECTAL ONCE
Status: COMPLETED | OUTPATIENT
Start: 2017-06-27 | End: 2017-06-27

## 2017-06-27 RX ORDER — POTASSIUM CHLORIDE 20 MEQ/1
40 TABLET, EXTENDED RELEASE ORAL ONCE
Status: COMPLETED | OUTPATIENT
Start: 2017-06-27 | End: 2017-06-28

## 2017-06-27 RX ORDER — OLANZAPINE 10 MG/2ML
2.5 INJECTION, POWDER, FOR SOLUTION INTRAMUSCULAR EVERY 8 HOURS PRN
Status: DISCONTINUED | OUTPATIENT
Start: 2017-06-27 | End: 2017-06-30 | Stop reason: HOSPADM

## 2017-06-27 RX ORDER — IBUPROFEN 200 MG
16 TABLET ORAL
Status: DISCONTINUED | OUTPATIENT
Start: 2017-06-27 | End: 2017-06-30 | Stop reason: HOSPADM

## 2017-06-27 RX ORDER — GLUCAGON 1 MG
1 KIT INJECTION
Status: DISCONTINUED | OUTPATIENT
Start: 2017-06-27 | End: 2017-06-30 | Stop reason: HOSPADM

## 2017-06-27 RX ORDER — ENOXAPARIN SODIUM 100 MG/ML
40 INJECTION SUBCUTANEOUS EVERY 24 HOURS
Status: DISCONTINUED | OUTPATIENT
Start: 2017-06-27 | End: 2017-06-30 | Stop reason: HOSPADM

## 2017-06-27 RX ORDER — LIDOCAINE 50 MG/G
1 PATCH TOPICAL DAILY
Status: DISCONTINUED | OUTPATIENT
Start: 2017-06-27 | End: 2017-06-30 | Stop reason: HOSPADM

## 2017-06-27 RX ORDER — LIDOCAINE 50 MG/G
1 PATCH TOPICAL DAILY PRN
Status: DISCONTINUED | OUTPATIENT
Start: 2017-06-27 | End: 2017-06-27

## 2017-06-27 RX ORDER — ENOXAPARIN SODIUM 100 MG/ML
40 INJECTION SUBCUTANEOUS EVERY 24 HOURS
Status: DISCONTINUED | OUTPATIENT
Start: 2017-06-27 | End: 2017-06-27

## 2017-06-27 RX ORDER — ASPIRIN 81 MG/1
81 TABLET ORAL DAILY
Status: DISCONTINUED | OUTPATIENT
Start: 2017-06-27 | End: 2017-06-30 | Stop reason: HOSPADM

## 2017-06-27 RX ORDER — SYRING-NEEDL,DISP,INSUL,0.3 ML 29 G X1/2"
300 SYRINGE, EMPTY DISPOSABLE MISCELLANEOUS
Status: COMPLETED | OUTPATIENT
Start: 2017-06-27 | End: 2017-06-28

## 2017-06-27 RX ORDER — PSEUDOEPHEDRINE/ACETAMINOPHEN 30MG-500MG
100 TABLET ORAL
Status: DISCONTINUED | OUTPATIENT
Start: 2017-06-27 | End: 2017-06-27

## 2017-06-27 RX ORDER — SYRING-NEEDL,DISP,INSUL,0.3 ML 29 G X1/2"
300 SYRINGE, EMPTY DISPOSABLE MISCELLANEOUS
Status: DISCONTINUED | OUTPATIENT
Start: 2017-06-27 | End: 2017-06-27

## 2017-06-27 RX ADMIN — MAGNESIUM SULFATE IN WATER 2 G: 40 INJECTION, SOLUTION INTRAVENOUS at 05:06

## 2017-06-27 RX ADMIN — ENOXAPARIN SODIUM 40 MG: 100 INJECTION SUBCUTANEOUS at 05:06

## 2017-06-27 RX ADMIN — GLYCERIN 1 SUPPOSITORY: 2.1 SUPPOSITORY RECTAL at 05:06

## 2017-06-27 RX ADMIN — OLANZAPINE 2.5 MG: 10 INJECTION, POWDER, LYOPHILIZED, FOR SOLUTION INTRAMUSCULAR at 12:06

## 2017-06-27 RX ADMIN — DIAZEPAM 5 MG: 5 INJECTION, SOLUTION INTRAMUSCULAR; INTRAVENOUS at 05:06

## 2017-06-27 RX ADMIN — LIDOCAINE 1 PATCH: 50 PATCH TOPICAL at 10:06

## 2017-06-27 NOTE — CONSULTS
[facility]  Neurology  Consult Note    Patient Name: Kim Bah  MRN: [id number]  Admission Date: 6/27/2017  Hospital Length of Stay: 0 days  Code Status: Full Code   Attending Provider: Re Clarke MD   Consulting Provider: Radha Brown MD  Primary Care Physician: Pavel Power MD  Principal Problem:Encephalopathy acute    Inpatient consult to Neurology  Consult performed by: RADHA BROWN  Consult ordered by: RE CAMARA         Subjective:     Chief Complaint:  Acute encephalopathy    HPI:   84 yo F with PMHx of Alzheimer dementia (family declined Namenda 2/2 cost and side effect profile), CAD on ASA and coreg, and RA presents to [facility] ED 06/27/17 brought in by daughter who is primary caregiver for acute change in mental status.  Daughter states that patient is screaming out as if in pain intermittently for approx 1 week.  Patient is responsive to daughter during these times and has not lost consciousness or had movements associated with screaming.  Daughter does note intermittent rigors but no myoclonic or tonic-clonic jerks.  In the past, patient has had these episodes and was found to have a UTI when brought in to hospital. Daughter also notices patient seems to be afraid that she is going to fall.  Patient has had falls in the past, last one ~1 month ago with no residual injury/deficits when patient seemed to roll out of bed and was found on the floor.  Daughter is concerned about inner ear pathology, like a vertigo, making patient feel like she is going to fall.  She describes the patient as holding onto railings of bed or seeming tense/afraid when up in chair with some occasional sliding out of chair.  ROS otherwise significant for 1 week constipation, which is a chronic issue.           Past Medical History:   Diagnosis Date    Coronary artery disease involving native coronary artery of native heart without angina pectoris 5/20/2017    Dementia      Difficult intubation     Rheumatoid arthritis     follows with Dr. Dayron Babb     Right Sided     Thyroid disease     thyroidectomy 20 years ago       Past Surgical History:   Procedure Laterality Date     SECTION      EYE SURGERY      FRACTURE SURGERY      right femur with pin implants     HERNIA REPAIR      HYSTERECTOMY      THYROIDECTOMY         Review of patient's allergies indicates:   Allergen Reactions    Tramadol Other (See Comments)     Hallucinations; psychosis     Current Neurological Medications:   *Patient is NOT taking Namenda*    No current facility-administered medications on file prior to encounter.      Current Outpatient Prescriptions on File Prior to Encounter   Medication Sig    carvedilol (COREG) 3.125 MG tablet Take 1 tablet (3.125 mg total) by mouth 2 (two) times daily.    DOXYLAMINE SUCCINATE (SLEEP AID, DOXYLAMINE, ORAL) Take 1 tablet by mouth nightly as needed (for sleep).    aspirin (ECOTRIN) 81 MG EC tablet Take 1 tablet (81 mg total) by mouth once daily.    cholecalciferol, vitamin D3, (VITAMIN D3) 5,000 unit Tab Take 10,000 Units by mouth once a week.    memantine (NAMENDA) 5 MG Tab TAKE 1 TABLET(5 MG) BY MOUTH EVERY DAY    naproxen sodium (ANAPROX) 220 MG tablet Take 220 mg by mouth daily as needed (for pain).     Family History     Problem Relation (Age of Onset)    Cancer Maternal Aunt        Social History Main Topics    Smoking status: Never Smoker    Smokeless tobacco: Not on file    Alcohol use No    Drug use: No    Sexual activity: Not Currently     Partners: Female     Birth control/ protection: Abstinence     Review of Systems   Unable to perform ROS: Dementia   Daughter assists with ROS--+ for rigors, constipation    Objective:     Vital Signs (Most Recent):  Temp: 97.8 °F (36.6 °C) (17 1104)  Pulse: 64 (17 1104)  Resp: 19 (17 1104)  BP: 123/60 (17 1104)  SpO2: 100 % (room air) (17 1104) Vital Signs  (24h Range):  Temp:  [97.5 °F (36.4 °C)-98.3 °F (36.8 °C)] 97.8 °F (36.6 °C)  Pulse:  [] 64  Resp:  [16-19] 19  SpO2:  [98 %-100 %] 100 %  BP: (118-160)/(60-73) 123/60     Weight: 72.6 kg (160 lb)  Body mass index is 35.87 kg/m².    Physical Exam  General:  Well-developed, mild cachexia, nad  HEENT:  NCAT, PERRL, EOMI, oropharyngeal membranes non-erythematous/without exudate  Neck:  No palpable lad, no apparent JVD, full ROM with no stiffness/pain with movement  Resp:  Symmetric expansion, no increased wob, CTAB  CVS:  RRR, no m/r/g.  Trace LE edema, non-pitting.  Radial, dorsalis pedis pulses 2+ and symmetric.  GI:  Abd soft, non-distended, non-tender to palpation, +BS  Neuro:  Mental Status:  Patient is able to state her name, recognizes her daughter but not oriented to place or date.  Speech is hypophonic with some slurring.    Cranial Nerves:  PERRL, EOMI.  Visual fields intact to confrontation throughout.  Facial movement intact, symmetric.  Facial sensation intact, symmetric.  Tongue protrudes midline.  Symmetric palate elevation.  Trap strength 4/5.  Motor:  Bulk and tone appropriate for age.  Strength 4/5 and symmetric for trapezius, biceps/triceps,  strength as well as hip flexors, knee extension, and plantarflexion/dorsiflexion.    Sensory:  Sensation intact to light touch at BUE, face.  Withdraws to noxious stimuli at BLE.  Coordination:  Unable to follow commands for FTN, HTS, BISHOP testing.    Gait:  Deferred       Significant Labs:     Recent Labs  Lab 06/27/17  0526   WBC 4.18   RBC 3.77*   HGB 11.0*   HCT 32.5*      MCV 86   MCH 29.2   MCHC 33.8       Recent Labs  Lab 06/27/17  0526   CALCIUM 8.2*   PROT 6.9      K 3.5   CO2 28      BUN 10   CREATININE 0.6   ALKPHOS 98   ALT 13   AST 28   BILITOT 0.5     Significant Imaging:   Imaging Results          X-Ray Chest 1 View (Final result)  Result time 06/27/17 11:15:30    Impression:     Allowing for difference in technique and  degree of inspiration, there probably has been no significant interval change               Narrative:    Results: Single view. Cardiac silhouette is within normal limits and unchanged. Tortuous thoracic aorta with atherosclerotic calcification in the wall of the aortic arch. Poor inspiration, accentuating the pulmonary vasculature. The right lung remains clear. Suggestion of some consolidation/atelectasis at the left base. Generalized osteopenia, thoracolumbar scoliosis, and DJD.                CT Head Without Contrast (Final result)  Result time 06/27/17 07:54:10    Impression:    No CT evidence of acute intracranial abnormality.  Generalized cerebral atrophy with stable chronic microvascular ischemic change.               Narrative:    Findings:  There is no evidence of acute intracranial hemorrhage, hydrocephalus, midline shift, or mass effect.There is age appropriate generalized cerebral atrophy with compensatory ventricular enlargement and sulcal widening. There are patchy regions of hypoattenuation in the supratentorial white matter likely consistent with chronic microvascular ischemic changes.The basal cisterns are patent. No extra-axial collections are appreciated.The visualized paranasal sinuses and mastoid air cells are clear.                 Assessment and Plan:     * Encephalopathy acute    -Ddx:  Infectious delirium, progression of dementia with behavioral symptoms, toxic/metabolic encephalopathy, constipation or pain-induced delirium  -Low concern for stroke/seizure given lack of focal deficits on neuro exam and hx not characteristic of seizures  -Pt with naproxen for RA, no additional pain medications or DMARDs.  Current presentation most concerning for pain-induced delirium in setting of advanced dementia.  -CBC, UA wnl.  CXR wnl.  CMP with hypocalcemia, hypomagnesemia.  Replete Mg, Ca as needed.  -Would treat for constipation, consider KUB prior to treatment to assess stool burden as well as  consider LILIANE due to hx of recent impaction  -Suggest follow up with Rheumatology--unsure as to why patient is only taking naproxen.  Note from 09/2016 detailed possible trial of sulfasalazine as well as daily prednisone (which daughter had weaned patient off of with no significant change in pain per chart review).  Pt seems to have diffuse pain in BLE joints.  -No additional imaging or EEG indicated at this time.  Optimize pain management +/- Rheum input.    - If pharmacalogic recs needed for agitation, consider psych consult     Thank you for your consult. Will sign off for now, please call us if reassessment is needed.     Allyn Mar MD  Neurology  [facility]    Patient and daughter interviewed by my and patient was examined by me today as well.  I agree with the history, exam, assessment and plan within the resident's note as stated above.  Note has been edited by me to reflect my work and changes. Would eval for causes of pain induced delerium, mostly constipation and arthritis pain. I do not find an indication for MRI brain or EEG. If needed consider psych consult for agitation.     Will sign off on this patient's care for now. Please call if any questions, clarifications or reassessment is needed.     Kassy Louis MD  Associate Staff  [facility]

## 2017-06-27 NOTE — ASSESSMENT & PLAN NOTE
Stable and asymptomatic at this time. Patient only taking Coreg at home.  1. Aspirin 81 mg daily.  2. Coreg 3.125 mg BID.

## 2017-06-27 NOTE — PT/OT/SLP PROGRESS
Physical Therapy      Kim Bah  MRN: [id number]    Began history portion of evaluation with Pt and daughter present. Pt poor historian. Pt and daughter participated until attempting to begin mobility evaluation. Pt stated she couldn't participate today with any activity including even rolling in bed. Daughter quietly reported that mother had bad day and had finally become calm and asked therapy to return tomorrow to complete evaluation  . Will follow-up tomorrow.    Lizeth Clemens, PT

## 2017-06-27 NOTE — ED TRIAGE NOTES
Kim Bah, a 85 y.o. female presents to the ED with her daughter stating that she has been altered today. The patients daughter states this has happened before when the patient had a bladder infection.  Patient is alert but not oriented.  Daughter reports that she has been this way, worsening for 2 days.  Reports that her blood pressure and her heart rate have both been elevated today.    Chief Complaint   Patient presents with    Altered Mental Status     pt presents to the ed with alteration in mental status      Review of patient's allergies indicates:   Allergen Reactions    Tramadol Other (See Comments)     Hallucinations; psychosis     Past Medical History:   Diagnosis Date    Coronary artery disease involving native coronary artery of native heart without angina pectoris 5/20/2017    Dementia     Difficult intubation     Rheumatoid arthritis     follows with Dr. Dayron Babb     Right Sided     Thyroid disease     thyroidectomy 20 years ago     Adult Physical Assessment  LOC: Kim Bah, 85 y.o. female verified via two identifiers.  The patient is awake, alert, oriented to self and family. Disoriented to time and situation.  APPEARANCE: Patient resting comfortably and appears to be in no acute distress at this time. Patient is clean and well groomed, patient's clothing is properly fastened.  SKIN:The skin is warm and dry, color consistent with ethnicity, patient has normal skin turgor and moist mucus membranes, skin intact, no breakdown or brusing noted.  MUSCULOSKELETAL: Patient weak in lower extremities, but at baseline, no obvious swelling or deformities noted.  RESPIRATORY: Airway is open and patent, respirations are spontaneous, patient has a normal effort and rate, no accessory muscle use noted.  CARDIAC: Patient has a normal rate and rhythm, no periphreal edema noted in any extremity, capillary refill < 3 seconds in all extremities  ABDOMEN: Soft and non tender to palpation, no  abdominal distention noted. Bowel sounds present in all four quadrants.  NEUROLOGIC: Eyes open spontaneously, behavior appropriate to situation, follows commands, facial expression symmetrical, bilateral hand grasp equal and even, purposeful motor response noted, normal sensation in all extremities when touched with a finger.

## 2017-06-27 NOTE — ASSESSMENT & PLAN NOTE
-Ddx:  Infectious delirium, progression of dementia with behavioral symptoms, toxic/metabolic encephalopathy, head trauma, constipation or pain-induced delirium, stroke, seizure  -Low concern for stroke/seizure given lack of focal deficits on neuro exam and hx not characteristic of seizures  -Pt with naproxen for RA, no additional pain medications or DMARDs.  Current presentation most concerning for pain-induced delirium in setting of advanced dementia.  -CBC, UA wnl.  CXR wnl.  CMP with hypocalcemia, hypomagnesemia.  Replete Mg, Ca as needed.  -Would treat for constipation, consider KUB prior to treatment to assess stool burden  -Suggest follow up with Rheumatology--unsure as to why patient is only taking naproxen.  Note from 09/2016 detailed possible trial of sulfasalazine as well as daily prednisone (which daughter had weaned patient off of with no significant change in pain per chart review).  Pt seems to have diffuse pain in BLE joints.  -No additional imaging or EEG indicated at this time.  Optimize pain management.

## 2017-06-27 NOTE — ED NOTES
Late entrty 12:25 Pt transported to OBS 11 via stretcher with escort and nurse.  Pt awake, requesting her IV to be taken out, but nurse explained to pt it needed to remain in place Pt condition stable  Family notified of room number Pt belongings sent with pt

## 2017-06-27 NOTE — ASSESSMENT & PLAN NOTE
Secondary to her advanced dementia and poor oral intake.  1. Cardiac diet.  2. Beneprotein powder.  3. Boost supplement with meals.

## 2017-06-27 NOTE — ED PROVIDER NOTES
Encounter Date: 2017    SCRIBE #1 NOTE: I, Aimee Mcintyre, am scribing for, and in the presence of,  Dr. Willis. I have scribed the following portions of the note - the APC attestation.       History     Chief Complaint   Patient presents with    Altered Mental Status     pt presents to the ed with alteration in mental status      85-year-old female presents to the ER for evaluation of altered mental status.  Patient is here with her caregiver, her daughter.  Daughter states this is happened before when the patient had a bladder infection.  Patient is alert but not oriented.  Daughter reports that she has been this way, worsening for 2 days.  Reports that her blood pressure and her heart rate have both been elevated today.          Review of patient's allergies indicates:   Allergen Reactions    Tramadol Other (See Comments)     Hallucinations; psychosis     Past Medical History:   Diagnosis Date    Coronary artery disease involving native coronary artery of native heart without angina pectoris 2017    Dementia     Difficult intubation     Rheumatoid arthritis(714.0)     follows with Dr. Dayron Babb     Right Sided     Thyroid disease     thyroidectomy 20 years ago     Past Surgical History:   Procedure Laterality Date     SECTION      EYE SURGERY      FRACTURE SURGERY      right femur with pin implants 2013    HERNIA REPAIR      HYSTERECTOMY      THYROIDECTOMY       Family History   Problem Relation Age of Onset    Cancer Maternal Aunt      Social History   Substance Use Topics    Smoking status: Never Smoker    Smokeless tobacco: Never Used    Alcohol use No     Review of Systems   Constitutional: Positive for activity change and appetite change. Negative for fever.   HENT: Negative for sore throat.    Respiratory: Negative for shortness of breath.    Cardiovascular: Negative for chest pain.   Gastrointestinal: Negative for nausea.   Genitourinary: Negative for dysuria.    Musculoskeletal: Negative for back pain.   Skin: Negative for rash.   Neurological: Negative for dizziness, weakness and light-headedness.   Hematological: Does not bruise/bleed easily.       Physical Exam     Initial Vitals [06/27/17 0107]   BP Pulse Resp Temp SpO2   (!) 160/72 (!) 112 18 97.6 °F (36.4 °C) 98 %      MAP       101.33         Physical Exam    Constitutional: Vital signs are normal. She appears well-developed and well-nourished.   Pt verbally abusive, According to family member, this is not her baseline.    HENT:   Head: Normocephalic and atraumatic.   Eyes: Conjunctivae are normal.   Cardiovascular: Normal rate and regular rhythm. Exam reveals no gallop and no friction rub.    No murmur heard.  Pulmonary/Chest: No respiratory distress. She has no wheezes. She has no rhonchi. She has no rales. She exhibits no tenderness.   Abdominal: Soft. Normal appearance, normal aorta and bowel sounds are normal.   Musculoskeletal: Normal range of motion.   Neurological: She is alert and oriented to person, place, and time.   Alert Not oriented   Skin: Skin is warm and intact.   Psychiatric: She has a normal mood and affect. Her speech is normal and behavior is normal. Cognition and memory are normal.         ED Course   Procedures  Labs Reviewed   CBC W/ AUTO DIFFERENTIAL - Abnormal; Notable for the following:        Result Value    RBC 3.77 (*)     Hemoglobin 11.0 (*)     Hematocrit 32.5 (*)     RDW 15.3 (*)     All other components within normal limits   COMPREHENSIVE METABOLIC PANEL - Abnormal; Notable for the following:     Calcium 8.2 (*)     Albumin 1.9 (*)     All other components within normal limits   URINALYSIS, REFLEX TO URINE CULTURE - Abnormal; Notable for the following:     Urobilinogen, UA 4.0-6.0 (*)     All other components within normal limits   MAGNESIUM - Abnormal; Notable for the following:     Magnesium 1.2 (*)     All other components within normal limits   AMMONIA   TSH   TSH     Narrative:     add on [id number] tsh per Jose Rafael Graham MD  06/27/2017  09:01    PHOSPHORUS   POCT GLUCOSE             Medical Decision Making:   History:   Old Medical Records: I decided to obtain old medical records.  Independently Interpreted Test(s):   I have ordered and independently interpreted X-rays - see summary below.       <> Summary of X-Ray Reading(s): CT head: nad  Clinical Tests:   Lab Tests: Ordered and Reviewed  Radiological Study: Ordered  ED Management:  85-year-old female with altered mental status.  This patient was signed out to the incoming physician assistant and staff for continued workup of her altered mental status  Other:   I have discussed this case with another health care provider.       <> Summary of the Discussion: IM            Scribe Attestation:   Scribe #1: I performed the above scribed service and the documentation accurately describes the services I performed. I attest to the accuracy of the note.    Attending Attestation:     Physician Attestation Statement for NP/PA:   I discussed this assessment and plan of this patient with the NP/PA, but I did not personally examine the patient. The face to face encounter was performed by the NP/PA.    Other NP/PA Attestation Additions:    History of Present Illness: [facility]         Physician Attestation for Scribe:  Physician Attestation Statement for Scribe #1: I, Dr. Willis, reviewed documentation, as scribed by Aimee Mcintyre in my presence, and it is both accurate and complete.                 ED Course     Clinical Impression:   The primary encounter diagnosis was Altered mental status. Diagnoses of Sinus pause, Encephalopathy acute, Anemia of chronic disorder, Chronic idiopathic constipation, Coronary artery disease involving native coronary artery of native heart without angina pectoris, Hypoalbuminemia due to protein-calorie malnutrition, Hypocalcemia, Late onset Alzheimer's disease with behavioral disturbance, Rheumatoid arthritis  involving both wrists with positive rheumatoid factor, Fasting hypoglycemia, and Hypomagnesemia were also pertinent to this visit.    Disposition:   Disposition: Placed in Observation  Condition: Serious                        Hernesto Yee PA-C  06/28/17 0537       Shamar Willis III, MD  07/21/17 2018

## 2017-06-27 NOTE — HOSPITAL COURSE
Patient evaluated by ED physicians. She was combative at the time and was treated with diazepam. She was admitted to Hospital Medicine Team L for continued management of encephalopathy. She was seen by Neurology service with recommendations for analgesic therapy and probable diagnosis of pain induced delirium. Patient had poor oral intake which led to fasting hypoglycemia.     Patient encouraged and ate 50% of at least 2 meals. Blood glucose levels above 70 thereafter. Pain controlled with analgesics. Coreg therapy was discontinued as patient developed hypotension responsive to volume replacement and had evidence of a sinus pause on ECG. She was medically stable for discharge yet her daughter refusing to take her home as she was still experiencing pain. I performed further chart review and it appears as though patients mobility has been very limited for the past year due to pain. Discharge was held in favor of Rheumatology service evaluation and further pain control. Prednisone 5 mg daily was started with marked improvement in her pain. Rheumatology agreed with this and ordered additional diagnostic studies plus recommended outpatient follow up with her rheumatologist Dr. Franco ([facility] [place] The following morning her pain was significantly improved though not yet resolved and her mentation was at baseline. She was once again medically stable for discharge.

## 2017-06-27 NOTE — HPI
"An 85-year-old woman, whose history is gathered by chart review and her daughter, with advanced dementia, CAD, previous recurrent UTI's, constipation and RA whom yesterday awoke from sleep screaming in pain. She could not specify quality, severity or location of her pain to her daughter who is her primary care taker. Per her daughter her screams were different from her usual pain screams and she was "not acting like herself". At baseline patient is oriented to person only and can specify what she is feeling. She was able to recognize her daughter and knew her name at the time of aforementioned events.     Further review of her chart reveals she has had screaming spells documented from 6/20/17.   "

## 2017-06-27 NOTE — ASSESSMENT & PLAN NOTE
Patient with change in baseline mental status of unclear etiology. No infectious etiology identified thus far. It would appear from chart review that these changes have been progressing over time so at this time it is unclear to me if this is progression of her dementia or encephalopathy due to organic/metabolic etiology.   1. CXR.  2. Magnesium and phosphorus levels.  3. Delirium precautions.  4. Zyprexa PRN for agitation.  5. Inpatient consult to Neurology service to assess if change in baseline status noted by her daughter is progression of her dementia.   6. Glucose levels.  7. Bedside swallow evaluation.

## 2017-06-27 NOTE — SUBJECTIVE & OBJECTIVE
Past Medical History:   Diagnosis Date    Coronary artery disease involving native coronary artery of native heart without angina pectoris 2017    Dementia     Difficult intubation     Rheumatoid arthritis     follows with Dr. Dayron Babb     Right Sided     Thyroid disease     thyroidectomy 20 years ago       Past Surgical History:   Procedure Laterality Date     SECTION      EYE SURGERY      FRACTURE SURGERY      right femur with pin implants 2013    HERNIA REPAIR      HYSTERECTOMY      THYROIDECTOMY         Review of patient's allergies indicates:   Allergen Reactions    Tramadol Other (See Comments)     Hallucinations; psychosis       No current facility-administered medications on file prior to encounter.      Current Outpatient Prescriptions on File Prior to Encounter   Medication Sig    carvedilol (COREG) 3.125 MG tablet Take 1 tablet (3.125 mg total) by mouth 2 (two) times daily.    DOXYLAMINE SUCCINATE (SLEEP AID, DOXYLAMINE, ORAL) Take 1 tablet by mouth nightly as needed (for sleep).    aspirin (ECOTRIN) 81 MG EC tablet Take 1 tablet (81 mg total) by mouth once daily.    cholecalciferol, vitamin D3, (VITAMIN D3) 5,000 unit Tab Take 10,000 Units by mouth once a week.    memantine (NAMENDA) 5 MG Tab TAKE 1 TABLET(5 MG) BY MOUTH EVERY DAY    naproxen sodium (ANAPROX) 220 MG tablet Take 220 mg by mouth daily as needed (for pain).     Family History     Problem Relation (Age of Onset)    Cancer Maternal Aunt        Social History Main Topics    Smoking status: Never Smoker    Smokeless tobacco: Not on file    Alcohol use No    Drug use: No    Sexual activity: Not Currently     Partners: Female     Birth control/ protection: Abstinence     Review of Systems   Unable to perform ROS: Acuity of condition     Objective:     Vital Signs (Most Recent):  Temp: 97.8 °F (36.6 °C) (17 1104)  Pulse: 64 (17 1104)  Resp: 19 (17 1104)  BP: 123/60 (17  1104)  SpO2: 100 % (room air) (06/27/17 1104) Vital Signs (24h Range):  Temp:  [97.5 °F (36.4 °C)-98.3 °F (36.8 °C)] 97.8 °F (36.6 °C)  Pulse:  [] 64  Resp:  [16-19] 19  SpO2:  [98 %-100 %] 100 %  BP: (118-160)/(60-73) 123/60     Weight: 72.6 kg (160 lb)  Body mass index is 35.87 kg/m².    Physical Exam   Constitutional: She appears well-developed. She appears lethargic.  Non-toxic appearance. No distress. She is sedated.   HENT:   Head: Normocephalic and atraumatic. Head is without right periorbital erythema and without left periorbital erythema.   Right Ear: Hearing, tympanic membrane, external ear and ear canal normal. No swelling.   Left Ear: Hearing, tympanic membrane, external ear and ear canal normal. No swelling.   Nose: Nose normal. No nasal deformity.   Mouth/Throat: Uvula is midline, oropharynx is clear and moist and mucous membranes are normal. No oropharyngeal exudate.   Eyes: Conjunctivae and lids are normal. Right eye exhibits no discharge and no exudate. Left eye exhibits no discharge and no exudate. Right conjunctiva is not injected. Left conjunctiva is not injected. No scleral icterus.   Neck: Normal range of motion. Neck supple. No hepatojugular reflux and no JVD present. No tracheal deviation present. No thyromegaly present.   Cardiovascular: Normal rate, regular rhythm, S1 normal, S2 normal, normal heart sounds and intact distal pulses.  Exam reveals no gallop and no friction rub.    No murmur heard.  Pulmonary/Chest: Effort normal. No accessory muscle usage or stridor. No tachypnea. No respiratory distress. She has decreased breath sounds in the right lower field and the left lower field. She has no wheezes. She has no rales. She exhibits no tenderness.   Abdominal: Soft. Normal appearance. She exhibits no distension, no fluid wave and no mass. Bowel sounds are decreased. There is generalized tenderness. There is no rebound and no guarding.   Musculoskeletal: Normal range of motion. She  exhibits edema (1+ of the bilateral lower extremities). She exhibits no tenderness.   Neurological: She appears lethargic. She displays no tremor.   Skin: Skin is warm and dry. No lesion and no rash noted. She is not diaphoretic. No cyanosis or erythema. No pallor. Nails show no clubbing.   Psychiatric: She is slowed. Cognition and memory are impaired.   Nursing note and vitals reviewed.       Significant Labs:   CBC:   Recent Labs  Lab 06/27/17  0526   WBC 4.18   HGB 11.0*   HCT 32.5*        CMP:   Recent Labs  Lab 06/27/17  0526      K 3.5      CO2 28   GLU 94   BUN 10   CREATININE 0.6   CALCIUM 8.2*   PROT 6.9   ALBUMIN 1.9*   BILITOT 0.5   ALKPHOS 98   AST 28   ALT 13   ANIONGAP 8   EGFRNONAA >60.0       Significant Imaging: I have reviewed all pertinent imaging results/findings within the past 24 hours.

## 2017-06-27 NOTE — ASSESSMENT & PLAN NOTE
Patient with last documented bowel movement 4-5 days ago with help of enema and disimpaction.   1. Brown bomb enema.  2. Will consider stool softeners after bedside swallow evaluation.

## 2017-06-27 NOTE — SUBJECTIVE & OBJECTIVE
Past Medical History:   Diagnosis Date    Coronary artery disease involving native coronary artery of native heart without angina pectoris 2017    Dementia     Difficult intubation     Rheumatoid arthritis     follows with Dr. Dayron Babb     Right Sided     Thyroid disease     thyroidectomy 20 years ago       Past Surgical History:   Procedure Laterality Date     SECTION      EYE SURGERY      FRACTURE SURGERY      right femur with pin implants 2013    HERNIA REPAIR      HYSTERECTOMY      THYROIDECTOMY         Review of patient's allergies indicates:   Allergen Reactions    Tramadol Other (See Comments)     Hallucinations; psychosis     Current Neurological Medications:   *Patient is NOT taking Namenda*    No current facility-administered medications on file prior to encounter.      Current Outpatient Prescriptions on File Prior to Encounter   Medication Sig    carvedilol (COREG) 3.125 MG tablet Take 1 tablet (3.125 mg total) by mouth 2 (two) times daily.    DOXYLAMINE SUCCINATE (SLEEP AID, DOXYLAMINE, ORAL) Take 1 tablet by mouth nightly as needed (for sleep).    aspirin (ECOTRIN) 81 MG EC tablet Take 1 tablet (81 mg total) by mouth once daily.    cholecalciferol, vitamin D3, (VITAMIN D3) 5,000 unit Tab Take 10,000 Units by mouth once a week.    memantine (NAMENDA) 5 MG Tab TAKE 1 TABLET(5 MG) BY MOUTH EVERY DAY    naproxen sodium (ANAPROX) 220 MG tablet Take 220 mg by mouth daily as needed (for pain).     Family History     Problem Relation (Age of Onset)    Cancer Maternal Aunt        Social History Main Topics    Smoking status: Never Smoker    Smokeless tobacco: Not on file    Alcohol use No    Drug use: No    Sexual activity: Not Currently     Partners: Female     Birth control/ protection: Abstinence     Review of Systems   Unable to perform ROS: Dementia   Daughter assists with ROS--+ for rigors, constipation    Objective:     Vital Signs (Most Recent):  Temp:  97.8 °F (36.6 °C) (06/27/17 1104)  Pulse: 64 (06/27/17 1104)  Resp: 19 (06/27/17 1104)  BP: 123/60 (06/27/17 1104)  SpO2: 100 % (room air) (06/27/17 1104) Vital Signs (24h Range):  Temp:  [97.5 °F (36.4 °C)-98.3 °F (36.8 °C)] 97.8 °F (36.6 °C)  Pulse:  [] 64  Resp:  [16-19] 19  SpO2:  [98 %-100 %] 100 %  BP: (118-160)/(60-73) 123/60     Weight: 72.6 kg (160 lb)  Body mass index is 35.87 kg/m².    Physical Exam  General:  Well-developed, mild cachexia, nad  HEENT:  NCAT, PERRL, EOMI, oropharyngeal membranes non-erythematous/without exudate  Neck:  No palpable lad, no apparent JVD, full ROM with no stiffness/pain with movement  Resp:  Symmetric expansion, no increased wob, CTAB  CVS:  RRR, no m/r/g.  Trace LE edema, non-pitting.  Radial, dorsalis pedis pulses 2+ and symmetric.  GI:  Abd soft, non-distended, non-tender to palpation, +BS  Neuro:  Mental Status:  Patient is able to state her name, recognizes her daughter but not oriented to place or date.  Speech is hypophonic with some slurring.    Cranial Nerves:  PERRL, EOMI.  Visual fields intact to confrontation throughout.  Facial movement intact, symmetric.  Facial sensation intact, symmetric.  Tongue protrudes midline.  Symmetric palate elevation.  Trap strength 4/5.  Motor:  Bulk and tone appropriate for age.  Strength 4/5 and symmetric for trapezius, biceps/triceps,  strength as well as hip flexors, knee extension, and plantarflexion/dorsiflexion.    Sensory:  Sensation intact to light touch at BUE, face.  Withdraws to noxious stimuli at BLE.  Coordination:  Unable to follow commands for FTN, HTS, BISHOP testing.    Gait:  Deferred       Significant Labs:     Recent Labs  Lab 06/27/17  0526   WBC 4.18   RBC 3.77*   HGB 11.0*   HCT 32.5*      MCV 86   MCH 29.2   MCHC 33.8       Recent Labs  Lab 06/27/17  0526   CALCIUM 8.2*   PROT 6.9      K 3.5   CO2 28      BUN 10   CREATININE 0.6   ALKPHOS 98   ALT 13   AST 28   BILITOT 0.5      Significant Imaging:   Imaging Results          X-Ray Chest 1 View (Final result)  Result time 06/27/17 11:15:30    Impression:     Allowing for difference in technique and degree of inspiration, there probably has been no significant interval change               Narrative:    Results: Single view. Cardiac silhouette is within normal limits and unchanged. Tortuous thoracic aorta with atherosclerotic calcification in the wall of the aortic arch. Poor inspiration, accentuating the pulmonary vasculature. The right lung remains clear. Suggestion of some consolidation/atelectasis at the left base. Generalized osteopenia, thoracolumbar scoliosis, and DJD.                CT Head Without Contrast (Final result)  Result time 06/27/17 07:54:10    Impression:    No CT evidence of acute intracranial abnormality.  Generalized cerebral atrophy with stable chronic microvascular ischemic change.               Narrative:    Findings:  There is no evidence of acute intracranial hemorrhage, hydrocephalus, midline shift, or mass effect.There is age appropriate generalized cerebral atrophy with compensatory ventricular enlargement and sulcal widening. There are patchy regions of hypoattenuation in the supratentorial white matter likely consistent with chronic microvascular ischemic changes.The basal cisterns are patent. No extra-axial collections are appreciated.The visualized paranasal sinuses and mastoid air cells are clear.

## 2017-06-27 NOTE — HPI
86 yo F with PMHx of Alzheimer dementia (family declined Namenda 2/2 cost and side effect profile), CAD on ASA and coreg, and RA presents to [facility] ED 06/27/17 brought in by daughter who is primary caregiver for acute change in mental status.  Daughter states that patient is screaming out as if in pain intermittently for approx 1 week.  Patient is responsive to daughter during these times and has not lost consciousness or had movements associated with screaming.  Daughter does note intermittent rigors but no myoclonic or tonic-clonic jerks.  In the past, patient has had these episodes and was found to have a UTI when brought in to hospital. Daughter also notices patient seems to be afraid that she is going to fall.  Patient has had falls in the past, last one ~1 month ago with no residual injury/deficits when patient seemed to roll out of bed and was found on the floor.  Daughter is concerned about inner ear pathology, like a vertigo, making patient feel like she is going to fall.  She describes the patient as holding onto railings of bed or seeming tense/afraid when up in chair with some occasional sliding out of chair.  ROS otherwise significant for 1 week constipation, which is a chronic issue.

## 2017-06-27 NOTE — ED NOTES
Pt awakens to verbal stimuli , Resp full and reg Breath sounds clear to upper lobes, crackles auscultated to lower lung fields Radial pulses strong and reg Abd soft to touch Bowel sounds audible to all four quad on auscultation

## 2017-06-27 NOTE — H&P
"[facility] Hospital Medicine  History & Physical    Patient Name: Kim Bah  MRN: [id number]  Admission Date: 2017  Attending Physician: Komal Clarke MD   Primary Care Provider: Pavel Power MD    [facility] Medicine Team: [facility]  Komal Clarke MD     Patient information was obtained from relative(s), past medical records and ER records.     Subjective:     Principal Problem:Encephalopathy acute    Chief Complaint:   Chief Complaint   Patient presents with    Altered Mental Status     pt presents to the ed with alteration in mental status         HPI: An 85-year-old woman, whose history is gathered by chart review and her daughter, with advanced dementia, CAD, previous recurrent UTI's, constipation and RA whom yesterday awoke from sleep screaming in pain. She could not specify quality, severity or location of her pain to her daughter who is her primary care taker. Per her daughter her screams were different from her usual pain screams and she was "not acting like herself". At baseline patient is oriented to person only and can specify what she is feeling. She was able to recognize her daughter and knew her name at the time of aforementioned events.     Further review of her chart reveals she has had screaming spells documented from 17.     Past Medical History:   Diagnosis Date    Coronary artery disease involving native coronary artery of native heart without angina pectoris 2017    Dementia     Difficult intubation     Rheumatoid arthritis     follows with Dr. Dayron Babb     Right Sided     Thyroid disease     thyroidectomy 20 years ago       Past Surgical History:   Procedure Laterality Date     SECTION      EYE SURGERY      FRACTURE SURGERY      right femur with pin implants 2013    HERNIA REPAIR      HYSTERECTOMY      THYROIDECTOMY         Review of patient's allergies indicates:   Allergen Reactions    Tramadol " Other (See Comments)     Hallucinations; psychosis       No current facility-administered medications on file prior to encounter.      Current Outpatient Prescriptions on File Prior to Encounter   Medication Sig    carvedilol (COREG) 3.125 MG tablet Take 1 tablet (3.125 mg total) by mouth 2 (two) times daily.    DOXYLAMINE SUCCINATE (SLEEP AID, DOXYLAMINE, ORAL) Take 1 tablet by mouth nightly as needed (for sleep).    aspirin (ECOTRIN) 81 MG EC tablet Take 1 tablet (81 mg total) by mouth once daily.    cholecalciferol, vitamin D3, (VITAMIN D3) 5,000 unit Tab Take 10,000 Units by mouth once a week.    memantine (NAMENDA) 5 MG Tab TAKE 1 TABLET(5 MG) BY MOUTH EVERY DAY    naproxen sodium (ANAPROX) 220 MG tablet Take 220 mg by mouth daily as needed (for pain).     Family History     Problem Relation (Age of Onset)    Cancer Maternal Aunt        Social History Main Topics    Smoking status: Never Smoker    Smokeless tobacco: Not on file    Alcohol use No    Drug use: No    Sexual activity: Not Currently     Partners: Female     Birth control/ protection: Abstinence     Review of Systems   Unable to perform ROS: Acuity of condition     Objective:     Vital Signs (Most Recent):  Temp: 97.8 °F (36.6 °C) (06/27/17 1104)  Pulse: 64 (06/27/17 1104)  Resp: 19 (06/27/17 1104)  BP: 123/60 (06/27/17 1104)  SpO2: 100 % (room air) (06/27/17 1104) Vital Signs (24h Range):  Temp:  [97.5 °F (36.4 °C)-98.3 °F (36.8 °C)] 97.8 °F (36.6 °C)  Pulse:  [] 64  Resp:  [16-19] 19  SpO2:  [98 %-100 %] 100 %  BP: (118-160)/(60-73) 123/60     Weight: 72.6 kg (160 lb)  Body mass index is 35.87 kg/m².    Physical Exam   Constitutional: She appears well-developed. She appears lethargic.  Non-toxic appearance. No distress. She is sedated.   HENT:   Head: Normocephalic and atraumatic. Head is without right periorbital erythema and without left periorbital erythema.   Right Ear: Hearing, tympanic membrane, external ear and ear canal  normal. No swelling.   Left Ear: Hearing, tympanic membrane, external ear and ear canal normal. No swelling.   Nose: Nose normal. No nasal deformity.   Mouth/Throat: Uvula is midline, oropharynx is clear and moist and mucous membranes are normal. No oropharyngeal exudate.   Eyes: Conjunctivae and lids are normal. Right eye exhibits no discharge and no exudate. Left eye exhibits no discharge and no exudate. Right conjunctiva is not injected. Left conjunctiva is not injected. No scleral icterus.   Neck: Normal range of motion. Neck supple. No hepatojugular reflux and no JVD present. No tracheal deviation present. No thyromegaly present.   Cardiovascular: Normal rate, regular rhythm, S1 normal, S2 normal, normal heart sounds and intact distal pulses.  Exam reveals no gallop and no friction rub.    No murmur heard.  Pulmonary/Chest: Effort normal. No accessory muscle usage or stridor. No tachypnea. No respiratory distress. She has decreased breath sounds in the right lower field and the left lower field. She has no wheezes. She has no rales. She exhibits no tenderness.   Abdominal: Soft. Normal appearance. She exhibits no distension, no fluid wave and no mass. Bowel sounds are decreased. There is generalized tenderness. There is no rebound and no guarding.   Musculoskeletal: Normal range of motion. She exhibits edema (1+ of the bilateral lower extremities). She exhibits no tenderness.   Neurological: She appears lethargic. She displays no tremor.   Skin: Skin is warm and dry. No lesion and no rash noted. She is not diaphoretic. No cyanosis or erythema. No pallor. Nails show no clubbing.   Psychiatric: She is slowed. Cognition and memory are impaired.   Nursing note and vitals reviewed.       Significant Labs:   CBC:   Recent Labs  Lab 06/27/17  0526   WBC 4.18   HGB 11.0*   HCT 32.5*        CMP:   Recent Labs  Lab 06/27/17  0526      K 3.5      CO2 28   GLU 94   BUN 10   CREATININE 0.6   CALCIUM 8.2*    PROT 6.9   ALBUMIN 1.9*   BILITOT 0.5   ALKPHOS 98   AST 28   ALT 13   ANIONGAP 8   EGFRNONAA >60.0       Significant Imaging: I have reviewed all pertinent imaging results/findings within the past 24 hours.    Assessment/Plan:     * Encephalopathy acute    Patient with change in baseline mental status of unclear etiology. No infectious etiology identified thus far. It would appear from chart review that these changes have been progressing over time so at this time it is unclear to me if this is progression of her dementia or encephalopathy due to organic/metabolic etiology.   1. CXR.  2. Magnesium and phosphorus levels.  3. Delirium precautions.  4. Zyprexa PRN for agitation.  5. Inpatient consult to Neurology service to assess if change in baseline status noted by her daughter is progression of her dementia.   6. Glucose levels.  7. Bedside swallow evaluation.           Late onset Alzheimer's disease with behavioral disturbance    As above.          Constipation    Patient with last documented bowel movement 4-5 days ago with help of enema and disimpaction.   1. Brown bomb enema.  2. Will consider stool softeners after bedside swallow evaluation.           Coronary artery disease involving native coronary artery of native heart without angina pectoris    Stable and asymptomatic at this time. Patient only taking Coreg at home.  1. Aspirin 81 mg daily.  2. Coreg 3.125 mg BID.            Hypoalbuminemia due to protein-calorie malnutrition    Secondary to her advanced dementia and poor oral intake.  1. Cardiac diet.  2. Beneprotein powder.  3. Boost supplement with meals.          Rheumatoid arthritis involving both wrists with positive rheumatoid factor    Stable.  1. Acetaminophen and lidocaine patch as needed for pain.          Anemia of chronic disorder    Stable, chronic, and asymptomatic.   1. No indication for RBC transfusion.  2. Monitor.           Hypocalcemia    Pseudo-hypocalcemia due to low albumin.  1.  No need to replace.            VTE Risk Mitigation         Ordered     enoxaparin injection 40 mg  Daily     Route:  Subcutaneous        06/27/17 7582        Komal Clarke MD  Department of Hospital Medicine   [facility]

## 2017-06-28 PROBLEM — E16.1 FASTING HYPOGLYCEMIA: Status: ACTIVE | Noted: 2017-06-28

## 2017-06-28 LAB
ANION GAP SERPL CALC-SCNC: 10 MMOL/L
BUN SERPL-MCNC: 11 MG/DL
CALCIUM SERPL-MCNC: 7.6 MG/DL
CHLORIDE SERPL-SCNC: 104 MMOL/L
CO2 SERPL-SCNC: 28 MMOL/L
CREAT SERPL-MCNC: 0.6 MG/DL
EST. GFR  (AFRICAN AMERICAN): >60 ML/MIN/1.73 M^2
EST. GFR  (NON AFRICAN AMERICAN): >60 ML/MIN/1.73 M^2
GLUCOSE SERPL-MCNC: 68 MG/DL
MAGNESIUM SERPL-MCNC: 2.3 MG/DL
POCT GLUCOSE: 71 MG/DL (ref 70–110)
POCT GLUCOSE: 79 MG/DL (ref 70–110)
POCT GLUCOSE: 87 MG/DL (ref 70–110)
POCT GLUCOSE: 88 MG/DL (ref 70–110)
POTASSIUM SERPL-SCNC: 3.6 MMOL/L
SODIUM SERPL-SCNC: 142 MMOL/L

## 2017-06-28 PROCEDURE — 25000003 PHARM REV CODE 250: Performed by: INTERNAL MEDICINE

## 2017-06-28 PROCEDURE — 25000003 PHARM REV CODE 250: Performed by: NURSE PRACTITIONER

## 2017-06-28 PROCEDURE — 11000001 HC ACUTE MED/SURG PRIVATE ROOM

## 2017-06-28 PROCEDURE — 99233 SBSQ HOSP IP/OBS HIGH 50: CPT | Mod: ,,, | Performed by: INTERNAL MEDICINE

## 2017-06-28 PROCEDURE — 80048 BASIC METABOLIC PNL TOTAL CA: CPT

## 2017-06-28 PROCEDURE — 63600175 PHARM REV CODE 636 W HCPCS: Performed by: INTERNAL MEDICINE

## 2017-06-28 PROCEDURE — 83735 ASSAY OF MAGNESIUM: CPT

## 2017-06-28 PROCEDURE — 36415 COLL VENOUS BLD VENIPUNCTURE: CPT

## 2017-06-28 RX ORDER — LACTULOSE 10 G/15ML
30 SOLUTION ORAL ONCE
Status: COMPLETED | OUTPATIENT
Start: 2017-06-28 | End: 2017-06-28

## 2017-06-28 RX ORDER — NAPROXEN SODIUM 275 MG/1
275 TABLET ORAL 2 TIMES DAILY WITH MEALS
Status: DISCONTINUED | OUTPATIENT
Start: 2017-06-28 | End: 2017-06-30 | Stop reason: HOSPADM

## 2017-06-28 RX ADMIN — MAGESIUM CITRATE 300 ML: 1.75 LIQUID ORAL at 06:06

## 2017-06-28 RX ADMIN — CARVEDILOL 3.12 MG: 3.12 TABLET, FILM COATED ORAL at 08:06

## 2017-06-28 RX ADMIN — ASPIRIN 81 MG: 81 TABLET, COATED ORAL at 08:06

## 2017-06-28 RX ADMIN — LACTULOSE 30 G: 20 SOLUTION ORAL at 12:06

## 2017-06-28 RX ADMIN — SODIUM CHLORIDE 500 ML: 0.9 INJECTION, SOLUTION INTRAVENOUS at 06:06

## 2017-06-28 RX ADMIN — Medication 100 ML: at 06:06

## 2017-06-28 RX ADMIN — SODIUM CHLORIDE 250 ML: 0.9 INJECTION, SOLUTION INTRAVENOUS at 09:06

## 2017-06-28 RX ADMIN — CARVEDILOL 3.12 MG: 3.12 TABLET, FILM COATED ORAL at 01:06

## 2017-06-28 RX ADMIN — NAPROXEN SODIUM 275 MG: 275 TABLET ORAL at 05:06

## 2017-06-28 RX ADMIN — POTASSIUM CHLORIDE 40 MEQ: 1500 TABLET, EXTENDED RELEASE ORAL at 01:06

## 2017-06-28 RX ADMIN — LIDOCAINE 1 PATCH: 50 PATCH TOPICAL at 08:06

## 2017-06-28 RX ADMIN — ENOXAPARIN SODIUM 40 MG: 100 INJECTION SUBCUTANEOUS at 05:06

## 2017-06-28 NOTE — PLAN OF CARE
Problem: Patient Care Overview  Goal: Plan of Care Review  Outcome: Ongoing (interventions implemented as appropriate)  Plan of care discussed with daughter at bedside earlier. Pt unable to comprehend; disoriented to place, time, and situation. Phone number left to add to notes. No distress noted. Noted constipation continues. Pt sleep with daughter not wanting me to wake to attempt enema earlier. Will do this a.m.. Incontinent bowel and bladder; voiding in briefs with small bowel movement (smear like) earlier; incontinent care provided. Weight shift provided. Will cont to monitor pt.

## 2017-06-28 NOTE — PLAN OF CARE
Met with pt's son at  who stated that his sister, Alicia , is the primary caregiver  I called Alicia and explained medicare rights over the phone w verb back. Pt has hx dementia and is sleeping.   CUrrent with [facility] via PHN  Has hospital bed, w/c , etc but will need w/c van ride home when med stable.  Pt lives with her daughter Alicia and grandson 24/7 per pt's son at   SW consulted     06/28/17 1050   Discharge Assessment   Assessment Type Discharge Planning Assessment   Confirmed/corrected address and phone number on facesheet? Yes   Assessment information obtained from? Caregiver;Medical Record;Other   Expected Length of Stay (days) 3   Prior to hospitilization cognitive status: (hx dementia)   Prior to hospitalization functional status: Wheelchair Bound;Completely Dependent   Current Functional Status: Wheelchair Bound;Completely Dependent   Arrived From home health   Lives With child(bambi), adult  (lives w daughter/grandson)   Able to Return to Prior Arrangements yes   Is patient able to care for self after discharge? No   How many people do you have in your home that can help with your care after discharge? 2   Who are your caregiver(s) and their phone number(s)? lives w daughter Alicia and grandson   [employer] work [phone]  or [phone]   Patient's perception of discharge disposition home health   Readmission Within The Last 30 Days no previous admission in last 30 days   Patient currently being followed by outpatient case management? No   Patient currently receives home health services? Yes   Patient previously received home health services and would like to resume services if necessary? Yes   Does the patient currently use HME? Yes   Patient currently receives private duty nursing? N/A   Patient currently receives any other outside agency services? No   Equipment Currently Used at Home hospital bed;wheelchair;rollator;bedside commode;shower chair;grab bar   Do you have any problems  affording any of your prescribed medications? No   Is the patient taking medications as prescribed? yes   Do you have any financial concerns preventing you from receiving the healthcare you need? No   Does the patient have transportation to healthcare appointments? No   Transportation Available (w/c van per daughter)   On Dialysis? No   Does the patient receive services at the [facility]? No   Are there any open cases? No   Discharge Plan A Home with family;Home Health   Discharge Plan B Other   Patient/Family In Agreement With Plan yes

## 2017-06-28 NOTE — PLAN OF CARE
Problem: Fall Risk (Adult)  Goal: Identify Related Risk Factors and Signs and Symptoms  Related risk factors and signs and symptoms are identified upon initiation of Human Response Clinical Practice Guideline (CPG)   Outcome: Ongoing (interventions implemented as appropriate)  Pt remained free from injury this shift. Safety precautions in progress, bed alarm armed, call light in reach. Q1H rounding completed.    Problem: Pressure Ulcer Risk (Davy Scale) (Adult,Obstetrics,Pediatric)  Goal: Identify Related Risk Factors and Signs and Symptoms  Related risk factors and signs and symptoms are identified upon initiation of Human Response Clinical Practice Guideline (CPG)   Outcome: Ongoing (interventions implemented as appropriate)  Pt repositioned q2h to relieve pressure points. Heels floated on pillow. Skin assessed.

## 2017-06-28 NOTE — PT/OT/SLP PROGRESS
"Occupational Therapy      Kim Bah  MRN: [id number]    Patient not seen today secondary to pt refusal in the AM. Pt ed re importance of therapy and OOB activity. Pt ed re risk of skin breakdown and pt responded "let it break down." OT and PT re-attempted eval in the PM, and son, present, reports pt has been in pain and just received a pain patch, and furthermore that the MD told him to let her rest today. Will follow-up next visit for OT evaluation. Orders acknowledged.    CAMILLA Lieberman  6/28/2017  Pager: [phone]    "

## 2017-06-28 NOTE — PT/OT/SLP PROGRESS
Physical Therapy      Kim Bah  MRN: [id number]    Patient not seen today secondary to pt refusing in AM. Attempted in afternoon and family at bed side reported MD had told him earlier that they were going to let her rest today due to pain that is now being treated with pain patch.  . Will follow-up next session.    Lizeth Clemens, PT

## 2017-06-29 PROBLEM — I45.5 SINUS PAUSE: Status: ACTIVE | Noted: 2017-06-29

## 2017-06-29 LAB
CRP SERPL-MCNC: 30.4 MG/L
ERYTHROCYTE [SEDIMENTATION RATE] IN BLOOD BY WESTERGREN METHOD: 55 MM/HR
POCT GLUCOSE: 104 MG/DL (ref 70–110)

## 2017-06-29 PROCEDURE — 11000001 HC ACUTE MED/SURG PRIVATE ROOM

## 2017-06-29 PROCEDURE — 99223 1ST HOSP IP/OBS HIGH 75: CPT | Mod: GC,,, | Performed by: INTERNAL MEDICINE

## 2017-06-29 PROCEDURE — 84165 PROTEIN E-PHORESIS SERUM: CPT

## 2017-06-29 PROCEDURE — 63600175 PHARM REV CODE 636 W HCPCS: Performed by: INTERNAL MEDICINE

## 2017-06-29 PROCEDURE — 36415 COLL VENOUS BLD VENIPUNCTURE: CPT

## 2017-06-29 PROCEDURE — 25000003 PHARM REV CODE 250: Performed by: INTERNAL MEDICINE

## 2017-06-29 PROCEDURE — 93005 ELECTROCARDIOGRAM TRACING: CPT

## 2017-06-29 PROCEDURE — 86334 IMMUNOFIX E-PHORESIS SERUM: CPT | Mod: 26,,, | Performed by: PATHOLOGY

## 2017-06-29 PROCEDURE — 99233 SBSQ HOSP IP/OBS HIGH 50: CPT | Mod: ,,, | Performed by: INTERNAL MEDICINE

## 2017-06-29 PROCEDURE — 84165 PROTEIN E-PHORESIS SERUM: CPT | Mod: 26,,, | Performed by: PATHOLOGY

## 2017-06-29 PROCEDURE — 93010 ELECTROCARDIOGRAM REPORT: CPT | Mod: S$GLB,,, | Performed by: INTERNAL MEDICINE

## 2017-06-29 PROCEDURE — 86140 C-REACTIVE PROTEIN: CPT

## 2017-06-29 PROCEDURE — 86334 IMMUNOFIX E-PHORESIS SERUM: CPT

## 2017-06-29 PROCEDURE — 85651 RBC SED RATE NONAUTOMATED: CPT

## 2017-06-29 RX ORDER — DEXTROMETHORPHAN HYDROBROMIDE, GUAIFENESIN 5; 100 MG/5ML; MG/5ML
650 LIQUID ORAL EVERY 8 HOURS PRN
Refills: 0 | COMMUNITY
Start: 2017-06-29

## 2017-06-29 RX ORDER — PANTOPRAZOLE SODIUM 40 MG/1
40 TABLET, DELAYED RELEASE ORAL DAILY
Status: DISCONTINUED | OUTPATIENT
Start: 2017-06-29 | End: 2017-06-30 | Stop reason: HOSPADM

## 2017-06-29 RX ORDER — LIDOCAINE 50 MG/G
1 PATCH TOPICAL DAILY
Qty: 15 PATCH | Refills: 0 | Status: SHIPPED | OUTPATIENT
Start: 2017-06-29 | End: 2017-07-25

## 2017-06-29 RX ORDER — PREDNISONE 5 MG/1
5 TABLET ORAL DAILY
Status: DISCONTINUED | OUTPATIENT
Start: 2017-06-29 | End: 2017-06-30 | Stop reason: HOSPADM

## 2017-06-29 RX ADMIN — PREDNISONE 5 MG: 5 TABLET ORAL at 01:06

## 2017-06-29 RX ADMIN — PANTOPRAZOLE SODIUM 40 MG: 40 TABLET, DELAYED RELEASE ORAL at 01:06

## 2017-06-29 RX ADMIN — OLANZAPINE 2.5 MG: 10 INJECTION, POWDER, LYOPHILIZED, FOR SOLUTION INTRAMUSCULAR at 07:06

## 2017-06-29 RX ADMIN — ASPIRIN 81 MG: 81 TABLET, COATED ORAL at 08:06

## 2017-06-29 RX ADMIN — LIDOCAINE 1 PATCH: 50 PATCH TOPICAL at 08:06

## 2017-06-29 RX ADMIN — NAPROXEN SODIUM 275 MG: 275 TABLET ORAL at 08:06

## 2017-06-29 NOTE — ASSESSMENT & PLAN NOTE
Stable and asymptomatic at this time. Patient only taking Coreg at home.  1. Aspirin 81 mg daily.  2. Discontinue Coreg due to below.

## 2017-06-29 NOTE — PLAN OF CARE
[facility]    HOME HEALTH ORDERS  FACE TO FACE ENCOUNTER    Patient Name: Kim Bah  YOB: 1932    PCP: Pavel Power MD   PCP Address: [address] / [facility] [address]  PCP Phone Number: [phone]  PCP Fax: [phone]    Encounter Date: 06/30/2017    Admit to Home Health    Diagnoses:  Active Hospital Problems    Diagnosis  POA    *Encephalopathy acute [G93.40]  Yes     Priority: 1 - High    Late onset Alzheimer's disease with behavioral disturbance [G30.1, F02.81]  Yes     Priority: 2      Chronic    Constipation [K59.00]  Yes     Priority: 3     Fasting hypoglycemia [E16.1]  Yes     Priority: 4     Coronary artery disease involving native coronary artery of native heart without angina pectoris [I25.10]  Yes     Priority: 4     Hypoalbuminemia due to protein-calorie malnutrition [E46]  Yes     Priority: 5     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       No future appointments.  Follow-up Information     Schedule an appointment as soon as possible for a visit with Pavel Power MD.    Specialty:  Internal Medicine  Why:  hospital follow up  Contact information:  [address]  [facility] [id number] [phone]                     I have seen and examined this patient face to face today. My clinical findings that support the need for the home health skilled services and home bound status are the following:  Weakness/numbness causing balance and gait disturbance due to Weakness/Debility making it taxing to leave home.  Patient with medication mismanagement issues  requiring home bound status as evidenced by  Poor understanding of medication regimen/dosage and Uncontrolled hyperglycemic/hypoglycemic events.  Medical restrictions requiring assistance of another human to leave home due to  Decreased range of motions in extremities.  Mental confusion making it unsafe for patient to leave home alone due to  Dementia.    Allergies:  Review of patient's allergies indicates:   Allergen Reactions    Tramadol Other (See Comments)     Hallucinations; psychosis       Diet: regular diet and supplements: Ensure, one can every 8 hours    Activities: activity as tolerated    Nursing:   SN to complete comprehensive assessment including routine vital signs. Instruct on disease process and s/s of complications to report to MD. Review/verify medication list sent home with the patient at time of discharge  and instruct patient/caregiver as needed. Frequency may be adjusted depending on start of care date.    Notify MD if SBP > 160 or < 90; DBP > 90 or < 50; HR > 120 or < 50; Temp > 101; Other:   N/A      CONSULTS:    Physical Therapy to evaluate and treat. Evaluate for home safety and equipment needs; Establish/upgrade home exercise program. Perform / instruct on therapeutic exercises, gait training, transfer training, and Range of Motion.  Occupational Therapy to evaluate and treat. Evaluate home environment for safety and equipment needs. Perform/Instruct on transfers, ADL training, ROM, and therapeutic exercises.   to evaluate for community resources/long-range planning.  Aide to provide assistance with personal care, ADLs, and vital signs.    MISCELLANEOUS CARE:  N/A    WOUND CARE ORDERS  n/a      Medications: Review discharge medications with patient and family and provide education.      Current Discharge Medication List      START taking these medications    Details   acetaminophen (TYLENOL) 650 MG TbSR Take 1 tablet (650 mg total) by mouth every 8 (eight) hours as needed  (for pain uncontrolled by Naproxen).  Refills: 0      lidocaine (LIDODERM) 5 % Place 1 patch onto the skin once daily. Remove & Discard patch within 12 hours or as directed by MD  Qty: 15 patch, Refills: 0      pantoprazole (PROTONIX) 40 MG tablet Take 1 tablet (40 mg total) by mouth once daily.  Qty: 15 tablet, Refills: 0      predniSONE (DELTASONE) 5 MG tablet Take 1 tablet (5 mg total) by mouth once daily.  Qty: 15 tablet, Refills: 0         CONTINUE these medications which have NOT CHANGED    Details   DOXYLAMINE SUCCINATE (SLEEP AID, DOXYLAMINE, ORAL) Take 1 tablet by mouth nightly as needed (for sleep).      aspirin (ECOTRIN) 81 MG EC tablet Take 1 tablet (81 mg total) by mouth once daily.  Refills: 0      cholecalciferol, vitamin D3, (VITAMIN D3) 5,000 unit Tab Take 10,000 Units by mouth once a week.      memantine (NAMENDA) 5 MG Tab TAKE 1 TABLET(5 MG) BY MOUTH EVERY DAY  Qty: 30 tablet, Refills: 0    Comments: **Patient requests 90 days supply**  Associated Diagnoses: Cognitive impairment      naproxen sodium (ANAPROX) 220 MG tablet Take 220 mg by mouth daily as needed (for pain).         STOP taking these medications       carvedilol (COREG) 3.125 MG tablet Comments:   Reason for Stopping:               I certify that this patient is confined to her home and needs intermittent skilled nursing care, physical therapy and occupational therapy.

## 2017-06-29 NOTE — ASSESSMENT & PLAN NOTE
Patient with fasting hypoglycemia likely secondary to poor oral intake. Suspect this may be contributing to her encephalopathy. Now resolved.   1. Ease dietary restriction in order to appeal to patients taste and increase chance of oral intake.  2. Boost plus supplement.

## 2017-06-29 NOTE — PHYSICIAN QUERY
PT Name: Kim Bah  MR #: [id number]    Physician Query Form - Nutrition Clarification     CDS/: Jannet Lentz RN          Contact information: [email or web address]    This form is a permanent document in the medical record.     Query Date: June 29, 2017    By submitting this query, we are merely seeking further clarification of documentation.. Please utilize your independent clinical jXudgment when addressing the question(s) below.    The Medical record contains the following:   Indicators  Supporting Clinical Findings Location in Medical Record    % of Estimated Energy Intake over a time frame from p.o., TF, or TPN      Weight Status over a time frame      Subcutaneous Fat and/or Muscle Loss      Fluid Accumulation or Edema      Reduced  Strength     X Wt / BMI / Usual Body Weight BMI 35.87 Anthropometrics    Delayed Wound Healing / Failure to Thrive     X Acute or Chronic Illness CAD, dementia,  Recurrent falls H & P    Medication     X Treatment Boost Supplements with each meal  Beneprotein powder drink with each meal Dietary orders 6/27   X Other Mild cachexia     Positive for activity change and appetite change. H & P    ED Provider Note     AND / ASPEN Clinical Characteristics (October 2011)  A minimum of two characteristics is recommended for diagnosing either moderate or severe malnutrition   Mild Malnutrition Moderate Malnutrition Severe Malnutrition   Energy Intake from p.o., TF or TPN. < 75% intake of estimated energy needs for less than 7 days < 75% intake of estimated energy needs for greater than 7 days < 50% intake of estimated energy needs for > 5 days   Weight Loss 1-2% in 1 month  5% in 3 months  7.5% in 6 months  10% in 1 year 1-2 % in 1 week  5% in 1 month  7.5% in 3 months  10% in 6 months  20% in 1 year > 2% in 1 week  > 5% in 1 month  > 7.5% in 3 months  > 10% in 6 months  > 20% in 1 year   Physical Findings     None *Mild subcutaneous fat and/or muscle loss  *Mild fluid  accumulation  *Stage II decubitus  *Surgical wound or non-healing wound *Mod/severe subcutaneous fat and/or muscle loss  *Mod/severe fluid accumulation  *Stage III or IV decubitus  *Non-healing surgical wound     Provider, please specify diagnosis or diagnoses associated with above clinical findings.    [x] Mild Protein-Calorie Malnutrition  [ ] Cachexia  [ ] Other Nutritional Diagnosis (please specify): ____________________________________  [ ] Other: ________________________________  [ ] Clinically Undetermined    Please document in your progress notes daily for the duration of treatment until resolved and include in your discharge summary.

## 2017-06-29 NOTE — CONSULTS
[facility]  Rheumatology  Consult Note    Patient Name: Kim Bah  MRN: [id number]  Admission Date: 2017  Hospital Length of Stay: 1 days  Code Status: Full Code   Attending Provider: Komal Clarke MD  Primary Care Physician: Pavel Power MD  Principal Problem:Encephalopathy acute    Consults  Subjective:     HPI: Pt is an 84 yo with seropositve erosive RA, dementia,  CAD currently admitted for AMS, treating for pain induced delirium, pt reportedly screaming out in pain for 1 week. Rheum consulted given RA history and medication recommendations.     RA: Pt was previously followed by Dr. Franco at [facility] but was most recently seen 2016 in [facility] for RA management. She was previously on MTX 15 mg weekly, prednisone 5 mg, and Remicade. MTX was discontinued due to increased confusion. Her meds then consisted of prednisone 5 mg daily and aleve.  On her visit with Dr. Carney, pt was placed on sulfasalazine after discussion with pt and her daughter. Sulfasazaline 500 mg bid that was increased to 1 g tid and prednisone was continued. Leflunomide was considered as well.  Per chart review, pt is off SSZ as her daughter thought it was ineffective. Pt on prednisone 5 mg/d now.     Pt reports that she feels well but pain is not bothersome, she sts her hands are okay but her feet can be problematic. Pt's daughter not present at time of interview, pt would like daughter to decide on medications.     Past Medical History:   Diagnosis Date    Coronary artery disease involving native coronary artery of native heart without angina pectoris 2017    Dementia     Difficult intubation     Rheumatoid arthritis     follows with Dr. Dayron Babb     Right Sided     Thyroid disease     thyroidectomy 20 years ago       Past Surgical History:   Procedure Laterality Date     SECTION      EYE SURGERY      FRACTURE SURGERY      right femur with pin implants  2013    HERNIA REPAIR      HYSTERECTOMY      THYROIDECTOMY         Immunization History   Administered Date(s) Administered    Pneumococcal Conjugate - 13 Valent 09/01/2016       Review of patient's allergies indicates:   Allergen Reactions    Tramadol Other (See Comments)     Hallucinations; psychosis     Current Facility-Administered Medications   Medication Frequency    aspirin EC tablet 81 mg Daily    dextrose 50% injection 12.5 g PRN    dextrose 50% injection 25 g PRN    enoxaparin injection 40 mg Daily    glucagon (human recombinant) injection 1 mg PRN    glucose chewable tablet 16 g PRN    glucose chewable tablet 24 g PRN    lidocaine 5 % patch 1 patch Daily    naproxen sodium tablet 275 mg BID WM    olanzapine injection 2.5 mg Q8H PRN    pantoprazole EC tablet 40 mg Daily    predniSONE tablet 5 mg Daily     Family History     Problem Relation (Age of Onset)    Cancer Maternal Aunt        Social History Main Topics    Smoking status: Never Smoker    Smokeless tobacco: Not on file    Alcohol use No    Drug use: No    Sexual activity: Not Currently     Partners: Female     Birth control/ protection: Abstinence     Review of Systems   Constitutional: Negative for chills and fever.   HENT: Negative for mouth sores and trouble swallowing.    Eyes: Negative for visual disturbance.   Respiratory: Negative for shortness of breath.    Gastrointestinal: Negative for abdominal pain, nausea and vomiting.   Musculoskeletal: Positive for arthralgias. Negative for joint swelling.   Skin: Negative for color change, pallor, rash and wound.   Neurological: Negative for tremors.   Psychiatric/Behavioral: Positive for confusion.     Objective:     Vital Signs (Most Recent):  Temp: 98.5 °F (36.9 °C) (06/29/17 1117)  Pulse: 73 (06/29/17 1117)  Resp: 17 (06/29/17 1117)  BP: (!) 121/59 (06/29/17 1117)  SpO2: 96 % (06/29/17 1117)  O2 Device (Oxygen Therapy): room air (06/29/17 1117) Vital Signs (24h  Range):  Temp:  [97.7 °F (36.5 °C)-98.9 °F (37.2 °C)] 98.5 °F (36.9 °C)  Pulse:  [67-87] 73  Resp:  [16-20] 17  SpO2:  [93 %-96 %] 96 %  BP: ()/(41-59) 121/59     Weight: 72.6 kg (160 lb) (06/27/17 2000)  Body mass index is 35.87 kg/m².  Body surface area is 1.69 meters squared.    No intake or output data in the 24 hours ending 06/29/17 1354    Physical Exam   Constitutional: She is well-developed, well-nourished, and in no distress.   HENT:   Mouth/Throat: Oropharynx is clear and moist.   Eyes: Conjunctivae are normal. No scleral icterus.   Neck: Normal range of motion.   Cardiovascular: Normal rate, regular rhythm and intact distal pulses.    Pulmonary/Chest: Effort normal and breath sounds normal.   Abdominal: Soft. There is no tenderness.       Right Side Rheumatological Exam     The patient is tender to palpation of the 4th MCP, 1st MTP, 2nd MTP, 3rd MTP, 4th MTP, 5th MTP, 1st toe IP, 2nd toe IP, 3rd toe IP, 4th toe IP and 5th toe IP    Left Side Rheumatological Exam     The patient is tender to palpation of the 5th MCP, 1st MTP, 2nd MTP, 3rd MTP, 4th MTP, 5th MTP, 1st toe IP, 2nd toe IP, 3rd toe IP, 4th toe IP and 5th toe IP.      Skin: Skin is warm and dry. No rash noted.     Musculoskeletal: She exhibits tenderness and deformity.   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  CMP: No results for input(s): GLU, CALCIUM, ALBUMIN, PROT, NA, K, CO2, CL, BUN, CREATININE, ALKPHOS, ALT, AST, BILITOT in the last 24 hours.  CRP: No results for input(s): CRP in the last 24 hours.  ESR: No results for input(s): SEDRATE in the last 24 hours.  All pertinent lab results from the last 24 hours have been reviewed.    Significant Imaging:  Imaging results within the past 24 hours have been reviewed.    Assessment/Plan:     Rheumatoid arthritis involving both wrists with positive rheumatoid factor     Pt is an 86 yo with seropositve erosive RA, dementia,  CAD currently admitted for AMS, treating for pain induced delirium, pt reportedly screaming out in pain for 1 week. Rheum consulted given RA history and medication recommendations.     RA seems stable at this time. On prednisone 5 mg/d currently. Failed MTX due to increased confusion. SSZ was ineffective.     Given delirium would exercise caution with high steroid dose given sensitivity to other medications.   Neurology evaluation- no indications for MRI, etc, w/u for delirium induced pain.     Plan:   - Can continue low dose prednisone 5 mg daily  for now  - Will order esr and CRP, SPEP w/ IF (done)   - okay with holding sulfasalazine at this time.   - further outpatient medication adjustments, would not add anything currently.   - would recommend pain management for any break through pain              Thank you for your consult. I will follow-up with patient. Please contact us if you have any additional questions.    Ian King MD  Rheumatology  [facility]    RHEUMATOLOGY ATTENDING:  Patient presented, personally interviewed and examined, medical records reviewed.  85 yr old lady who I saw once last September. She has sero+ ccp+ RA but apparently is being followed by Dr. Franco at \[facility]".  She was admitted with exacerbation of her dementia and was c/o pain.  She is on prednisone 5 mg/d and off SSZ which we had substituted for MTX as daughter thought dementia got worse on it.  Apparently she was tapered off SSZ because it was not helping her.    Current exam with deformities c/w RA but no active synovitis.  She is not tender to touch over Ra joints.   Suggest leave on prednisone and use supportive rx.   On rounds, she did not appear to be in pain and verbalized it.   Findings discussed with patient and Dr. Cartagena whose note and assessment I agree with.

## 2017-06-29 NOTE — PROGRESS NOTES
Notified Aneudy Begum NP of pt's low BP (82/41) instructed to administer 250 mL of NS. Will continue to monitor vitals

## 2017-06-29 NOTE — ASSESSMENT & PLAN NOTE
Acute encephalopathy likely due to pain induced delirium. Per my discussion with the patient's POA she was only taking Coreg at home. Encephalopathy appears to be improving.  1. Lidoderm patch.  2. Naproxen scheduled to help control pain.  3. Delirium precautions.  4. Zyprexa PRN for agitation.

## 2017-06-29 NOTE — ASSESSMENT & PLAN NOTE
Pt is an 84 yo with seropositve erosive RA, dementia,  CAD currently admitted for AMS, treating for pain induced delirium, pt reportedly screaming out in pain for 1 week. Rheum consulted given RA history and medication recommendations.     RA seems stable at this time. On prednisone 5 mg/d currently. Failed MTX due to increased confusion. SSZ was ineffective.     Given delirium would exercise caution with high steroid dose given sensitivity to other medications.   Neurology evaluation- no indications for MRI, etc, w/u for delirium induced pain.     Plan:   - Can continue low dose prednisone 5 mg daily  for now  - Will order esr and CRP, SPEP w/ IF (done)   - okay with holding sulfasalazine at this time.   - further outpatient medication adjustments, would not add anything currently.   - would recommend pain management for any break through pain

## 2017-06-29 NOTE — PLAN OF CARE
10:39 AM  SW received home health orders. Pt is current with [facility] through [facility]. Faxed orders to [facility] and spoke with intake at [facility] regarding referral.   Called daughter and left voicemail regarding discharge. Will arrange transportation through [facility] per CM.   10:47 AM  Called [employer] [facility] to indicate that SW will be setting up transportation. Pt stated son was at bs, spoke with son who verified to set up transportation through [facility]. DESTINY Rainey to call SW when pt ready for transportation to be called.     Roxi Kidd, MARIO    [id number]

## 2017-06-29 NOTE — PROGRESS NOTES
"[facility] Hospital Medicine  Progress Note    Patient Name: Kim Bah  MRN: [id number]  Patient Class: IP- Inpatient   Admission Date: 6/27/2017  Length of Stay: 0 days  Attending Physician: Komal Clarke MD  Primary Care Provider: Pavel Power MD    [facility] Medicine Team: [facility] HOSP MED L Komal Clarke MD    Subjective:     Principal Problem:Encephalopathy acute    HPI:  An 85-year-old woman, whose history is gathered by chart review and her daughter, with advanced dementia, CAD, previous recurrent UTI's, constipation and RA whom yesterday awoke from sleep screaming in pain. She could not specify quality, severity or location of her pain to her daughter who is her primary care taker. Per her daughter her screams were different from her usual pain screams and she was "not acting like herself". At baseline patient is oriented to person only and can specify what she is feeling. She was able to recognize her daughter and knew her name at the time of aforementioned events.     Further review of her chart reveals she has had screaming spells documented from 6/20/17.     Hospital Course:  Patient evaluated by ED physicians. She was combative at the time and was treated with diazepam. She was admitted to Hospital Medicine Team L for continued management of encephalopathy. She was seen by Neurology service with recommendations for analgesic therapy and probable diagnosis of pain induced delirium. Patient had poor oral intake which led to fasting hypoglycemia.     Interval History: "OK but my knees hurt". No acute overnight events. No oral intake yesterday.     Review of Systems   Constitutional: Negative for chills, fatigue, fever and unexpected weight change.   HENT: Negative for ear pain, facial swelling, hearing loss, mouth sores, nosebleeds, rhinorrhea, sinus pressure, sore throat, tinnitus, trouble swallowing and voice change.    Eyes: Negative for photophobia, pain, redness " and visual disturbance.   Respiratory: Negative for cough, chest tightness, shortness of breath and wheezing.    Cardiovascular: Negative for chest pain, palpitations and leg swelling.   Gastrointestinal: Negative for abdominal pain, blood in stool, constipation, diarrhea, nausea and vomiting.   Endocrine: Negative for cold intolerance, heat intolerance, polydipsia, polyphagia and polyuria.   Genitourinary: Negative for decreased urine volume, dysuria, flank pain, frequency, hematuria, menstrual problem, urgency, vaginal bleeding, vaginal discharge and vaginal pain.   Musculoskeletal: Positive for arthralgias. Negative for back pain, joint swelling, myalgias and neck pain.   Skin: Negative for rash.   Allergic/Immunologic: Negative for environmental allergies, food allergies and immunocompromised state.   Neurological: Negative for dizziness, seizures, syncope, weakness, light-headedness, numbness and headaches.   Hematological: Negative for adenopathy. Does not bruise/bleed easily.   Psychiatric/Behavioral: Negative for confusion, hallucinations, self-injury, sleep disturbance and suicidal ideas. The patient is not nervous/anxious.      Objective:     Vital Signs (Most Recent):  Temp: 98.9 °F (37.2 °C) (06/28/17 1952)  Pulse: 71 (06/28/17 1952)  Resp: 16 (06/28/17 1952)  BP: (!) 98/51 (06/28/17 1952)  SpO2: (!) 94 % (06/28/17 1952) Vital Signs (24h Range):  Temp:  [98.3 °F (36.8 °C)-99.1 °F (37.3 °C)] 98.9 °F (37.2 °C)  Pulse:  [65-78] 71  Resp:  [16-18] 16  SpO2:  [94 %-99 %] 94 %  BP: ()/(51-82) 98/51     Weight: 72.6 kg (160 lb)  Body mass index is 35.87 kg/m².    Intake/Output Summary (Last 24 hours) at 06/28/17 2015  Last data filed at 06/28/17 1200   Gross per 24 hour   Intake              360 ml   Output                0 ml   Net              360 ml      Physical Exam   Constitutional: Vital signs are normal. She appears well-developed.  Non-toxic appearance. No distress. She is sedated.   HENT:    Head: Normocephalic and atraumatic. Head is without right periorbital erythema and without left periorbital erythema.   Right Ear: Hearing, tympanic membrane, external ear and ear canal normal. No swelling.   Left Ear: Hearing, tympanic membrane, external ear and ear canal normal. No swelling.   Nose: Nose normal. No nasal deformity.   Mouth/Throat: Uvula is midline, oropharynx is clear and moist and mucous membranes are normal. No oropharyngeal exudate.   Eyes: Conjunctivae and lids are normal. Right eye exhibits no discharge and no exudate. Left eye exhibits no discharge and no exudate. Right conjunctiva is not injected. Left conjunctiva is not injected. No scleral icterus.   Neck: Normal range of motion. Neck supple. No hepatojugular reflux and no JVD present. No tracheal deviation present. No thyromegaly present.   Cardiovascular: Normal rate, regular rhythm, S1 normal, S2 normal, normal heart sounds and intact distal pulses.  Exam reveals no gallop and no friction rub.    No murmur heard.  Pulmonary/Chest: Effort normal. No accessory muscle usage or stridor. No tachypnea. No respiratory distress. She has decreased breath sounds in the right lower field and the left lower field. She has no wheezes. She has no rales. She exhibits no tenderness.   Abdominal: Soft. Normal appearance. She exhibits no distension, no fluid wave and no mass. Bowel sounds are decreased. There is generalized tenderness. There is no rebound and no guarding.   Musculoskeletal: Normal range of motion. She exhibits edema (1+ of the bilateral lower extremities) and tenderness (bilatera knees).   Neurological: She is alert. She displays no tremor.   Oriented in person   Skin: Skin is warm and dry. No lesion and no rash noted. She is not diaphoretic. No cyanosis or erythema. No pallor. Nails show no clubbing.   Psychiatric: She is slowed. Cognition and memory are impaired.   Nursing note and vitals reviewed.      Significant Labs:   BMP:    Recent Labs  Lab 06/28/17  0541   GLU 68*      K 3.6      CO2 28   BUN 11   CREATININE 0.6   CALCIUM 7.6*   MG 2.3     CBC:   Recent Labs  Lab 06/27/17  0526   WBC 4.18   HGB 11.0*   HCT 32.5*          Significant Imaging: I have reviewed all pertinent imaging results/findings within the past 24 hours.    Assessment/Plan:      * Encephalopathy acute    Acute encephalopathy likely due to pain induced delirium. Per my discussion with the patient's POA she was only taking Coreg at home. Encephalopathy appears to be improving.  1. Lidoderm patch.  2. Naproxen scheduled to help control pain.  3. Delirium precautions.  4. Zyprexa PRN for agitation.          Late onset Alzheimer's disease with behavioral disturbance    Stable and chronic.   1. Management as above.        Constipation    Patient with documented bowel movement after enema, and lactulose therapy.    1. Continue stool softener.         Fasting hypoglycemia    Patient with fasting hypoglycemia likely secondary to poor oral intake. Suspect this may be contributing to her encephalopathy.   1. Will ease dietary restriction in order to appeal to patients taste and increase chance of oral intake.  2. Calorie count.  3. Boost plus supplement.          Coronary artery disease involving native coronary artery of native heart without angina pectoris    Stable and asymptomatic at this time. Patient only taking Coreg at home.  1. Aspirin 81 mg daily.  2. Coreg 3.125 mg BID.            Hypoalbuminemia due to protein-calorie malnutrition    Secondary to her advanced dementia and poor oral intake.  1. Cardiac diet.  2. Beneprotein powder.  3. Boost supplement with meals.          Rheumatoid arthritis involving both wrists with positive rheumatoid factor    Stable.  1. Acetaminophen and lidocaine patch as needed for pain.  2. Will start scheduled naproxen and monitor pain.           Hypomagnesemia    Present on admission. Patient received magnesium  sulfate replacement and now resolved.   1. Monitor and replace as needed.           Anemia of chronic disorder    Stable, chronic, and asymptomatic.   1. No indication for RBC transfusion.  2. Monitor.           Hypocalcemia    Pseudo-hypocalcemia due to low albumin.  1. No need to replace.            VTE Risk Mitigation         Ordered     enoxaparin injection 40 mg  Daily     Route:  Subcutaneous        06/27/17 2471          Komal Clarke MD  Department of Hospital Medicine   [facility]

## 2017-06-29 NOTE — ASSESSMENT & PLAN NOTE
Patient with fasting hypoglycemia likely secondary to poor oral intake. Suspect this may be contributing to her encephalopathy.   1. Will ease dietary restriction in order to appeal to patients taste and increase chance of oral intake.  2. Calorie count.  3. Boost plus supplement.

## 2017-06-29 NOTE — ASSESSMENT & PLAN NOTE
Acute encephalopathy likely due to pain induced delirium. Patient now appears to be at baseline.  1. Lidoderm patch.  2. Naproxen scheduled to help control pain.  3. Delirium precautions.  4. Medically stable for discharge today.

## 2017-06-29 NOTE — ASSESSMENT & PLAN NOTE
Secondary to her advanced dementia and poor oral intake.  1. Regular diet.  2. Beneprotein powder.  3. Boost supplement with meals.

## 2017-06-29 NOTE — PT/OT/SLP PROGRESS
"Occupational Therapy      Kim Bah  MRN: [id number]    Patient not seen today secondary to pt refusal. Pt ed extensively on the importance of therapy, particularly re bed mobility to prevent pressure sores. Pt continued to refuse and repeatedly stated "I am not playing this game." Multiple attempts re OT and PT evaluations, yet pt continues to refuse. OT acknowledged orders and to discontinue.    CAMILLA Lieberman  6/29/2017  Pager: [phone]    "

## 2017-06-29 NOTE — ASSESSMENT & PLAN NOTE
Present on admission. Patient received magnesium sulfate replacement and now resolved.   1. Monitor and replace as needed.

## 2017-06-29 NOTE — ASSESSMENT & PLAN NOTE
Stable.  1. Acetaminophen and lidocaine patch as needed for pain.  2. Will start scheduled naproxen and monitor pain.

## 2017-06-29 NOTE — HPI
Pt is an 84 yo with seropositve erosive RA, dementia,  CAD currently admitted for AMS, treating for pain induced delirium, pt reportedly screaming out in pain for 1 week. Rheum consulted given RA history and medication recommendations.     RA: Pt was previously followed by Dr. Franco at [facility] but was most recently seen 9/2016 in [facility] for RA management. She was previously on MTX 15 mg weekly, prednisone 5 mg, and Remicade. MTX was discontinued due to increased confusion. Her meds then consisted of prednisone 5 mg daily and aleve.  On her visit with Dr. Carney, pt was placed on sulfasalazine after discussion with pt and her daughter. Sulfasazaline 500 mg bid that was increased to 1 g tid and prednisone was continued. Leflunomide was considered as well.  Per chart review, pt is off SSZ as her daughter thought it was ineffective. Pt on prednisone 5 mg/d now.     Pt reports that she feels well but pain is not bothersome, she sts her hands are okay but her feet can be problematic. Pt's daughter not present at time of interview, pt would like daughter to decide on medications.

## 2017-06-29 NOTE — SUBJECTIVE & OBJECTIVE
"Interval History: "OK but my knees hurt". No acute overnight events. No oral intake yesterday.     Review of Systems   Constitutional: Negative for chills, fatigue, fever and unexpected weight change.   HENT: Negative for ear pain, facial swelling, hearing loss, mouth sores, nosebleeds, rhinorrhea, sinus pressure, sore throat, tinnitus, trouble swallowing and voice change.    Eyes: Negative for photophobia, pain, redness and visual disturbance.   Respiratory: Negative for cough, chest tightness, shortness of breath and wheezing.    Cardiovascular: Negative for chest pain, palpitations and leg swelling.   Gastrointestinal: Negative for abdominal pain, blood in stool, constipation, diarrhea, nausea and vomiting.   Endocrine: Negative for cold intolerance, heat intolerance, polydipsia, polyphagia and polyuria.   Genitourinary: Negative for decreased urine volume, dysuria, flank pain, frequency, hematuria, menstrual problem, urgency, vaginal bleeding, vaginal discharge and vaginal pain.   Musculoskeletal: Positive for arthralgias. Negative for back pain, joint swelling, myalgias and neck pain.   Skin: Negative for rash.   Allergic/Immunologic: Negative for environmental allergies, food allergies and immunocompromised state.   Neurological: Negative for dizziness, seizures, syncope, weakness, light-headedness, numbness and headaches.   Hematological: Negative for adenopathy. Does not bruise/bleed easily.   Psychiatric/Behavioral: Negative for confusion, hallucinations, self-injury, sleep disturbance and suicidal ideas. The patient is not nervous/anxious.      Objective:     Vital Signs (Most Recent):  Temp: 98.9 °F (37.2 °C) (06/28/17 1952)  Pulse: 71 (06/28/17 1952)  Resp: 16 (06/28/17 1952)  BP: (!) 98/51 (06/28/17 1952)  SpO2: (!) 94 % (06/28/17 1952) Vital Signs (24h Range):  Temp:  [98.3 °F (36.8 °C)-99.1 °F (37.3 °C)] 98.9 °F (37.2 °C)  Pulse:  [65-78] 71  Resp:  [16-18] 16  SpO2:  [94 %-99 %] 94 %  BP: " ()/(51-82) 98/51     Weight: 72.6 kg (160 lb)  Body mass index is 35.87 kg/m².    Intake/Output Summary (Last 24 hours) at 06/28/17 2015  Last data filed at 06/28/17 1200   Gross per 24 hour   Intake              360 ml   Output                0 ml   Net              360 ml      Physical Exam   Constitutional: Vital signs are normal. She appears well-developed.  Non-toxic appearance. No distress. She is sedated.   HENT:   Head: Normocephalic and atraumatic. Head is without right periorbital erythema and without left periorbital erythema.   Right Ear: Hearing, tympanic membrane, external ear and ear canal normal. No swelling.   Left Ear: Hearing, tympanic membrane, external ear and ear canal normal. No swelling.   Nose: Nose normal. No nasal deformity.   Mouth/Throat: Uvula is midline, oropharynx is clear and moist and mucous membranes are normal. No oropharyngeal exudate.   Eyes: Conjunctivae and lids are normal. Right eye exhibits no discharge and no exudate. Left eye exhibits no discharge and no exudate. Right conjunctiva is not injected. Left conjunctiva is not injected. No scleral icterus.   Neck: Normal range of motion. Neck supple. No hepatojugular reflux and no JVD present. No tracheal deviation present. No thyromegaly present.   Cardiovascular: Normal rate, regular rhythm, S1 normal, S2 normal, normal heart sounds and intact distal pulses.  Exam reveals no gallop and no friction rub.    No murmur heard.  Pulmonary/Chest: Effort normal. No accessory muscle usage or stridor. No tachypnea. No respiratory distress. She has decreased breath sounds in the right lower field and the left lower field. She has no wheezes. She has no rales. She exhibits no tenderness.   Abdominal: Soft. Normal appearance. She exhibits no distension, no fluid wave and no mass. Bowel sounds are decreased. There is generalized tenderness. There is no rebound and no guarding.   Musculoskeletal: Normal range of motion. She exhibits  edema (1+ of the bilateral lower extremities) and tenderness (bilatera knees).   Neurological: She is alert. She displays no tremor.   Oriented in person   Skin: Skin is warm and dry. No lesion and no rash noted. She is not diaphoretic. No cyanosis or erythema. No pallor. Nails show no clubbing.   Psychiatric: She is slowed. Cognition and memory are impaired.   Nursing note and vitals reviewed.      Significant Labs:   BMP:   Recent Labs  Lab 06/28/17  0541   GLU 68*      K 3.6      CO2 28   BUN 11   CREATININE 0.6   CALCIUM 7.6*   MG 2.3     CBC:   Recent Labs  Lab 06/27/17  0526   WBC 4.18   HGB 11.0*   HCT 32.5*          Significant Imaging: I have reviewed all pertinent imaging results/findings within the past 24 hours.

## 2017-06-29 NOTE — ASSESSMENT & PLAN NOTE
"Stable. Per her son she has previously been on prednisone therapy however this was discontinued due to "shingles in her face close to her eye" which suspect may have been Herpes Zoster  1. Acetaminophen and lidocaine patch as needed for pain.  2. Scheduled naproxen and monitor pain.     "

## 2017-06-29 NOTE — ASSESSMENT & PLAN NOTE
Patient with documented bowel movement after enema, and lactulose therapy.    1. Continue stool softener.

## 2017-06-29 NOTE — SUBJECTIVE & OBJECTIVE
"Interval History: "My knees still hurt". No acute overnight events.     Review of Systems   Constitutional: Negative for chills, fatigue, fever and unexpected weight change.   HENT: Negative for ear pain, facial swelling, hearing loss, mouth sores, nosebleeds, rhinorrhea, sinus pressure, sore throat, tinnitus, trouble swallowing and voice change.    Eyes: Negative for photophobia, pain, redness and visual disturbance.   Respiratory: Negative for cough, chest tightness, shortness of breath and wheezing.    Cardiovascular: Negative for chest pain, palpitations and leg swelling.   Gastrointestinal: Negative for abdominal pain, blood in stool, constipation, diarrhea, nausea and vomiting.   Endocrine: Negative for cold intolerance, heat intolerance, polydipsia, polyphagia and polyuria.   Genitourinary: Negative for decreased urine volume, dysuria, flank pain, frequency, hematuria, menstrual problem, urgency, vaginal bleeding, vaginal discharge and vaginal pain.   Musculoskeletal: Positive for arthralgias. Negative for back pain, joint swelling, myalgias and neck pain.   Skin: Negative for rash.   Allergic/Immunologic: Negative for environmental allergies, food allergies and immunocompromised state.   Neurological: Negative for dizziness, seizures, syncope, weakness, light-headedness, numbness and headaches.   Hematological: Negative for adenopathy. Does not bruise/bleed easily.   Psychiatric/Behavioral: Negative for confusion, hallucinations, self-injury, sleep disturbance and suicidal ideas. The patient is not nervous/anxious.      Objective:     Vital Signs (Most Recent):  Temp: 98.7 °F (37.1 °C) (06/29/17 0800)  Pulse: 77 (06/29/17 0800)  Resp: 18 (06/29/17 0800)  BP: (!) 122/58 (06/29/17 0800)  SpO2: (!) 93 % (06/29/17 0800) Vital Signs (24h Range):  Temp:  [97.7 °F (36.5 °C)-99.1 °F (37.3 °C)] 98.7 °F (37.1 °C)  Pulse:  [67-87] 77  Resp:  [16-20] 18  SpO2:  [93 %-96 %] 93 %  BP: ()/(41-58) 122/58     Weight: " 72.6 kg (160 lb)  Body mass index is 35.87 kg/m².    Intake/Output Summary (Last 24 hours) at 06/29/17 0953  Last data filed at 06/28/17 1200   Gross per 24 hour   Intake              120 ml   Output                0 ml   Net              120 ml      Physical Exam   Constitutional: Vital signs are normal. She appears well-developed.  Non-toxic appearance. No distress. She is sedated.   HENT:   Head: Normocephalic and atraumatic. Head is without right periorbital erythema and without left periorbital erythema.   Right Ear: Hearing, tympanic membrane, external ear and ear canal normal. No swelling.   Left Ear: Hearing, tympanic membrane, external ear and ear canal normal. No swelling.   Nose: Nose normal. No nasal deformity.   Mouth/Throat: Uvula is midline, oropharynx is clear and moist and mucous membranes are normal. No oropharyngeal exudate.   Eyes: Conjunctivae and lids are normal. Right eye exhibits no discharge and no exudate. Left eye exhibits no discharge and no exudate. Right conjunctiva is not injected. Left conjunctiva is not injected. No scleral icterus.   Neck: Normal range of motion. Neck supple. No hepatojugular reflux and no JVD present. No tracheal deviation present. No thyromegaly present.   Cardiovascular: Normal rate, regular rhythm, S1 normal, S2 normal, normal heart sounds and intact distal pulses.  Exam reveals no gallop and no friction rub.    No murmur heard.  Pulmonary/Chest: Effort normal and breath sounds normal. No accessory muscle usage or stridor. No tachypnea. No respiratory distress. She has no decreased breath sounds. She has no wheezes. She has no rales. She exhibits no tenderness.   Abdominal: Soft. Normal appearance and bowel sounds are normal. She exhibits no distension, no fluid wave and no mass. There is no tenderness. There is no rebound and no guarding.   Musculoskeletal: Normal range of motion. She exhibits edema (1+ of the bilateral lower extremities) and tenderness  (bilatera knees).   Neurological: She is alert. She displays no tremor.   Oriented in person   Skin: Skin is warm and dry. No lesion and no rash noted. She is not diaphoretic. No cyanosis or erythema. No pallor. Nails show no clubbing.   Psychiatric: She is slowed. Cognition and memory are impaired.   Nursing note and vitals reviewed.      Significant Labs:   BMP:   Recent Labs  Lab 06/28/17  0541   GLU 68*      K 3.6      CO2 28   BUN 11   CREATININE 0.6   CALCIUM 7.6*   MG 2.3     CBC: No results for input(s): WBC, HGB, HCT, PLT in the last 48 hours.  POCT Glucose:   Recent Labs  Lab 06/28/17  0645 06/28/17  1204 06/28/17  2348   POCTGLUCOSE 79 [id number] 87       Significant Imaging: I have reviewed all pertinent imaging results/findings within the past 24 hours.

## 2017-06-29 NOTE — PT/OT/SLP PROGRESS
Physical Therapy      Kim Bah  MRN: [id number]    Patient not seen today secondary to  Refusing therapy. PT attempted therapy 3 days in a row. PT to DC therapy.  Lizeth Clemens, PT

## 2017-06-29 NOTE — PLAN OF CARE
1:31 PM  CAR informed PHN that pt will not be discharging today, however PHN has orders when pt is ready for discharge.     Roxi Kidd, MARIO    [id number]

## 2017-06-29 NOTE — PROGRESS NOTES
"[facility] Hospital Medicine  Progress Note    Patient Name: Kim Bah  MRN: [id number]  Patient Class: IP- Inpatient   Admission Date: 6/27/2017  Length of Stay: 1 days  Attending Physician: Komal Clarke MD  Primary Care Provider: Pavel Power MD    [facility] [place]: [facility] [place] Komal Clarke MD    Subjective:     Principal Problem:Encephalopathy acute    HPI:  An 85-year-old woman, whose history is gathered by chart review and her daughter, with advanced dementia, CAD, previous recurrent UTI's, constipation and RA whom yesterday awoke from sleep screaming in pain. She could not specify quality, severity or location of her pain to her daughter who is her primary care taker. Per her daughter her screams were different from her usual pain screams and she was "not acting like herself". At baseline patient is oriented to person only and can specify what she is feeling. She was able to recognize her daughter and knew her name at the time of aforementioned events.     Further review of her chart reveals she has had screaming spells documented from 6/20/17.     Hospital Course:  Patient evaluated by ED physicians. She was combative at the time and was treated with diazepam. She was admitted to Hospital Medicine Team L for continued management of encephalopathy. She was seen by Neurology service with recommendations for analgesic therapy and probable diagnosis of pain induced delirium. Patient had poor oral intake which led to fasting hypoglycemia.     Patient encouraged and ate 50% of at least 2 meals. Blood glucose levels above 70 thereafter. Pain controlled with analgesics. Coreg therapy was discontinued as patient developed hypotension responsive to volume replacement and had evidence of a sinus pause on ECG. She was medically stable for discharge yet her daughter refusing to take her home as she is still experiencing pain. I've performed further chart review and it " "appears as though patient mobility has been very limited for the past year due to pain. I will hold discharge for now and start prednisone 5 mg daily.    Interval History: "My knees still hurt". No acute overnight events.     Review of Systems   Constitutional: Negative for chills, fatigue, fever and unexpected weight change.   HENT: Negative for ear pain, facial swelling, hearing loss, mouth sores, nosebleeds, rhinorrhea, sinus pressure, sore throat, tinnitus, trouble swallowing and voice change.    Eyes: Negative for photophobia, pain, redness and visual disturbance.   Respiratory: Negative for cough, chest tightness, shortness of breath and wheezing.    Cardiovascular: Negative for chest pain, palpitations and leg swelling.   Gastrointestinal: Negative for abdominal pain, blood in stool, constipation, diarrhea, nausea and vomiting.   Endocrine: Negative for cold intolerance, heat intolerance, polydipsia, polyphagia and polyuria.   Genitourinary: Negative for decreased urine volume, dysuria, flank pain, frequency, hematuria, menstrual problem, urgency, vaginal bleeding, vaginal discharge and vaginal pain.   Musculoskeletal: Positive for arthralgias. Negative for back pain, joint swelling, myalgias and neck pain.   Skin: Negative for rash.   Allergic/Immunologic: Negative for environmental allergies, food allergies and immunocompromised state.   Neurological: Negative for dizziness, seizures, syncope, weakness, light-headedness, numbness and headaches.   Hematological: Negative for adenopathy. Does not bruise/bleed easily.   Psychiatric/Behavioral: Negative for confusion, hallucinations, self-injury, sleep disturbance and suicidal ideas. The patient is not nervous/anxious.      Objective:     Vital Signs (Most Recent):  Temp: 98.7 °F (37.1 °C) (06/29/17 0800)  Pulse: 77 (06/29/17 0800)  Resp: 18 (06/29/17 0800)  BP: (!) 122/58 (06/29/17 0800)  SpO2: (!) 93 % (06/29/17 0800) Vital Signs (24h Range):  Temp:  [97.7 °F " (36.5 °C)-99.1 °F (37.3 °C)] 98.7 °F (37.1 °C)  Pulse:  [67-87] 77  Resp:  [16-20] 18  SpO2:  [93 %-96 %] 93 %  BP: ()/(41-58) 122/58     Weight: 72.6 kg (160 lb)  Body mass index is 35.87 kg/m².    Intake/Output Summary (Last 24 hours) at 06/29/17 0953  Last data filed at 06/28/17 1200   Gross per 24 hour   Intake              120 ml   Output                0 ml   Net              120 ml      Physical Exam   Constitutional: Vital signs are normal. She appears well-developed.  Non-toxic appearance. No distress. She is sedated.   HENT:   Head: Normocephalic and atraumatic. Head is without right periorbital erythema and without left periorbital erythema.   Right Ear: Hearing, tympanic membrane, external ear and ear canal normal. No swelling.   Left Ear: Hearing, tympanic membrane, external ear and ear canal normal. No swelling.   Nose: Nose normal. No nasal deformity.   Mouth/Throat: Uvula is midline, oropharynx is clear and moist and mucous membranes are normal. No oropharyngeal exudate.   Eyes: Conjunctivae and lids are normal. Right eye exhibits no discharge and no exudate. Left eye exhibits no discharge and no exudate. Right conjunctiva is not injected. Left conjunctiva is not injected. No scleral icterus.   Neck: Normal range of motion. Neck supple. No hepatojugular reflux and no JVD present. No tracheal deviation present. No thyromegaly present.   Cardiovascular: Normal rate, regular rhythm, S1 normal, S2 normal, normal heart sounds and intact distal pulses.  Exam reveals no gallop and no friction rub.    No murmur heard.  Pulmonary/Chest: Effort normal and breath sounds normal. No accessory muscle usage or stridor. No tachypnea. No respiratory distress. She has no decreased breath sounds. She has no wheezes. She has no rales. She exhibits no tenderness.   Abdominal: Soft. Normal appearance and bowel sounds are normal. She exhibits no distension, no fluid wave and no mass. There is no tenderness. There is  no rebound and no guarding.   Musculoskeletal: Normal range of motion. She exhibits edema (1+ of the bilateral lower extremities) and tenderness (bilatera knees).   Neurological: She is alert. She displays no tremor.   Oriented in person   Skin: Skin is warm and dry. No lesion and no rash noted. She is not diaphoretic. No cyanosis or erythema. No pallor. Nails show no clubbing.   Psychiatric: She is slowed. Cognition and memory are impaired.   Nursing note and vitals reviewed.      Significant Labs:   BMP:   Recent Labs  Lab 06/28/17  0541   GLU 68*      K 3.6      CO2 28   BUN 11   CREATININE 0.6   CALCIUM 7.6*   MG 2.3     CBC: No results for input(s): WBC, HGB, HCT, PLT in the last 48 hours.  POCT Glucose:   Recent Labs  Lab 06/28/17  0645 06/28/17  1204 06/28/17  2348   POCTGLUCOSE 79 88 87       Significant Imaging: I have reviewed all pertinent imaging results/findings within the past 24 hours.    Assessment/Plan:      * Encephalopathy acute    Acute encephalopathy likely due to pain induced delirium. Patient now appears to be at baseline.  1. Lidoderm patch.  2. Naproxen scheduled to help control pain.  3. Delirium precautions.  4. Medically stable for discharge today.          Late onset Alzheimer's disease with behavioral disturbance    Chronic.   1. Outpatient management by PCP.        Constipation    Patient with documented bowel movement after enema, and lactulose therapy.    1. Continue stool softener.         Fasting hypoglycemia    Patient with fasting hypoglycemia likely secondary to poor oral intake. Suspect this may be contributing to her encephalopathy. Now resolved.   1. Ease dietary restriction in order to appeal to patients taste and increase chance of oral intake.  2. Boost plus supplement.          Coronary artery disease involving native coronary artery of native heart without angina pectoris    Stable and asymptomatic at this time. Patient only taking Coreg at home.  1. Aspirin  "81 mg daily.  2. Discontinue Coreg due to below.            Hypoalbuminemia due to protein-calorie malnutrition    Secondary to her advanced dementia and poor oral intake.  1. Regular diet.  2. Beneprotein powder.  3. Boost supplement with meals.          Rheumatoid arthritis involving both wrists with positive rheumatoid factor    Stable. Per her son she has previously been on prednisone therapy however this was discontinued due to "shingles in her face close to her eye" which suspect may have been Herpes Zoster  1. Acetaminophen and lidocaine patch as needed for pain.  2. Scheduled naproxen and monitor pain.           Hypomagnesemia    Present on admission. Patient received magnesium sulfate replacement and now resolved.   1. Monitor and replace as needed.           Anemia of chronic disorder    Stable, chronic, and asymptomatic.   1. No indication for RBC transfusion.  2. Monitor.           Hypocalcemia    Pseudo-hypocalcemia due to low albumin.  1. No need to replace.          Sinus pause    Patient with hypotension and new sinus pause on ECG.  1. Discontinue Coreg.   2. Repeat ECG and likely discharge thereafter as above.            VTE Risk Mitigation         Ordered     enoxaparin injection 40 mg  Daily     Route:  Subcutaneous        06/27/17 9056          Komal Clarke MD  Department of Hospital Medicine   [facility]  "

## 2017-06-29 NOTE — ASSESSMENT & PLAN NOTE
Patient with hypotension and new sinus pause on ECG.  1. Discontinue Coreg.   2. Repeat ECG and likely discharge thereafter as above.

## 2017-06-30 VITALS
BODY MASS INDEX: 35.99 KG/M2 | SYSTOLIC BLOOD PRESSURE: 153 MMHG | OXYGEN SATURATION: 97 % | RESPIRATION RATE: 20 BRPM | DIASTOLIC BLOOD PRESSURE: 67 MMHG | HEIGHT: 56 IN | TEMPERATURE: 98 F | HEART RATE: 70 BPM | WEIGHT: 160 LBS

## 2017-06-30 PROBLEM — E16.1 FASTING HYPOGLYCEMIA: Status: RESOLVED | Noted: 2017-06-28 | Resolved: 2017-06-30

## 2017-06-30 PROBLEM — E83.51 HYPOCALCEMIA: Status: RESOLVED | Noted: 2017-06-27 | Resolved: 2017-06-30

## 2017-06-30 PROBLEM — G93.40 ENCEPHALOPATHY ACUTE: Status: RESOLVED | Noted: 2017-06-27 | Resolved: 2017-06-30

## 2017-06-30 PROBLEM — E83.42 HYPOMAGNESEMIA: Status: RESOLVED | Noted: 2017-05-20 | Resolved: 2017-06-30

## 2017-06-30 LAB
ALBUMIN SERPL ELPH-MCNC: 2.13 G/DL
ALPHA1 GLOB SERPL ELPH-MCNC: 0.37 G/DL
ALPHA2 GLOB SERPL ELPH-MCNC: 0.61 G/DL
B-GLOBULIN SERPL ELPH-MCNC: 0.78 G/DL
GAMMA GLOB SERPL ELPH-MCNC: 2.32 G/DL
INTERPRETATION SERPL IFE-IMP: NORMAL
PATHOLOGIST INTERPRETATION IFE: NORMAL
PATHOLOGIST INTERPRETATION SPE: NORMAL
POCT GLUCOSE: 131 MG/DL (ref 70–110)
POCT GLUCOSE: 68 MG/DL (ref 70–110)
POCT GLUCOSE: 73 MG/DL (ref 70–110)
POCT GLUCOSE: 84 MG/DL (ref 70–110)
PROT SERPL-MCNC: 6.2 G/DL

## 2017-06-30 PROCEDURE — 99238 HOSP IP/OBS DSCHRG MGMT 30/<: CPT | Mod: ,,, | Performed by: INTERNAL MEDICINE

## 2017-06-30 PROCEDURE — 25000003 PHARM REV CODE 250: Performed by: INTERNAL MEDICINE

## 2017-06-30 PROCEDURE — 63600175 PHARM REV CODE 636 W HCPCS: Performed by: INTERNAL MEDICINE

## 2017-06-30 RX ORDER — PANTOPRAZOLE SODIUM 40 MG/1
40 TABLET, DELAYED RELEASE ORAL DAILY
Qty: 15 TABLET | Refills: 0 | Status: SHIPPED | OUTPATIENT
Start: 2017-06-30 | End: 2018-01-05

## 2017-06-30 RX ORDER — PREDNISONE 5 MG/1
5 TABLET ORAL DAILY
Qty: 15 TABLET | Refills: 0 | Status: SHIPPED | OUTPATIENT
Start: 2017-06-30 | End: 2017-07-15

## 2017-06-30 RX ADMIN — LIDOCAINE 1 PATCH: 50 PATCH TOPICAL at 09:06

## 2017-06-30 RX ADMIN — PREDNISONE 5 MG: 5 TABLET ORAL at 09:06

## 2017-06-30 RX ADMIN — NAPROXEN SODIUM 275 MG: 275 TABLET ORAL at 08:06

## 2017-06-30 RX ADMIN — ASPIRIN 81 MG: 81 TABLET, COATED ORAL at 09:06

## 2017-06-30 RX ADMIN — PANTOPRAZOLE SODIUM 40 MG: 40 TABLET, DELAYED RELEASE ORAL at 09:06

## 2017-06-30 RX ADMIN — Medication 16 G: at 07:06

## 2017-06-30 NOTE — PLAN OF CARE
6:40 PM. On call SW received page pt was denied transportation with [employer] wheel chair van due to pt having poor trunk control and unable to sit in the wheel chail. Pt is a N pt, placed call to afterhours [phone] requesting an ambulance authorization. Waiting for nurse to return call. CAR to provide [phone] for nurses station to be contacted if auth comes after on call SW to leave.     After PHN auth received [facility] is to be [facility] [phone] pts nurse verbalized understanding.    6:55 PM. Transport set up with [facility] on pending waiting PHN auth. CAR provided [facility] number to after hours PHN once auth available, transport will then become active.      6:59 PM. PHN auth number [id number] transportation taken off will call      ERMELINDA Gomez LMSW  [phone]

## 2017-06-30 NOTE — ASSESSMENT & PLAN NOTE
"Stable. Per her son she has previously been on prednisone therapy however this was discontinued due to "shingles in her face close to her eye" which suspect may have been Herpes Zoster  1. Acetaminophen and lidocaine patch as needed for pain.  2. Scheduled naproxen.  3. Prednisone 5 mg daily.  4. Outpatient follow up with Dr. Franco.   "

## 2017-06-30 NOTE — ASSESSMENT & PLAN NOTE
Chronic.   1. Outpatient management by PCP.  2. Long discussion with daughter over the phone about limitations for prescribing sedative. Patients daughter counseled to contact PCP for these medications or for outpatient psychiatry evaluation.

## 2017-06-30 NOTE — PLAN OF CARE
Problem: Patient Care Overview  Goal: Plan of Care Review  Updated on plan of care, all questions and concerns addressed.  Pt to be discharged home with home health.  Spoke with daughter who stated she could not get home to care for pt until after 5 pm due to her employment.  AA&Ox3 Resp even unlabored, color pink.  No significant events this shift.  Will monitor.

## 2017-06-30 NOTE — PLAN OF CARE
10:42 AM  SW received a call from RN, Romana who stated she spoke with daughter and she is requesting transportation bring her home for 4:30PM. Called [employer] and informed [facility] that pt will not be ready until after 4:30PM.     Roxi Kidd, CAR    [phone]

## 2017-06-30 NOTE — DISCHARGE SUMMARY
"[facility] Hospital Medicine  Discharge Summary      Patient Name: Kim Bah  MRN: [id number]  Admission Date: 6/27/2017  Hospital Length of Stay: 2 days  Discharge Date and Time: 6/30/2017  8:52 PM  Attending Physician: Komal Clarke MD   Discharging Provider: Komal Clarke MD  Primary Care Provider: Pavel Power MD    [facility] Medicine Team: [facility] L Komal Clarke MD    HPI: An 85-year-old woman, whose history is gathered by chart review and her daughter, with advanced dementia, CAD, previous recurrent UTI's, constipation and RA whom yesterday awoke from sleep screaming in pain. She could not specify quality, severity or location of her pain to her daughter who is her primary care taker. Per her daughter her screams were different from her usual pain screams and she was "not acting like herself". At baseline patient is oriented to person only and can specify what she is feeling. She was able to recognize her daughter and knew her name at the time of aforementioned events.      Further review of her chart reveals she has had screaming spells documented from 6/20/17.     * No surgery found *      Indwelling Lines/Drains at time of discharge:   Lines/Drains/Airways          No matching active lines, drains, or airways        Hospital Course: Patient evaluated by ED physicians. She was combative at the time and was treated with diazepam. She was admitted to Hospital Medicine Team L for continued management of encephalopathy. She was seen by Neurology service with recommendations for analgesic therapy and probable diagnosis of pain induced delirium. Patient had poor oral intake which led to fasting hypoglycemia.      Patient encouraged and ate 50% of at least 2 meals. Blood glucose levels above 70 thereafter. Pain controlled with analgesics. Coreg therapy was discontinued as patient developed hypotension responsive to volume replacement and had evidence of a sinus " pause on ECG. She was medically stable for discharge yet her daughter refusing to take her home as she was still experiencing pain. I performed further chart review and it appears as though patients mobility has been very limited for the past year due to pain. Discharge was held in favor of Rheumatology service evaluation and further pain control. Prednisone 5 mg daily was started with marked improvement in her pain. Rheumatology agreed with this and ordered additional diagnostic studies plus recommended outpatient follow up with her rheumatologist Dr. Franco ([facility]). The following morning her pain was significantly improved though not yet resolved and her mentation was at baseline. She was once again medically stable for discharge.     Consults:   Consults         Status Ordering Provider     Case Management/  Once     Provider:  (Not yet assigned)    Acknowledged RE CAMARA     Inpatient consult to Neurology  Once     Provider:  (Not yet assigned)    Completed ABURTO ABURTO RE     Inpatient consult to Rheumatology  Once     Provider:  (Not yet assigned)    Completed ABURTO ABURTO RE     Inpatient consult to Social Work  Once     Provider:  (Not yet assigned)    Acknowledged LAMONTE ABURTO RE          Significant Diagnostic Studies: Labs: BMP: No results for input(s): GLU, NA, K, CL, CO2, BUN, CREATININE, CALCIUM, MG in the last 48 hours. and CBC No results for input(s): WBC, HGB, HCT, PLT in the last 48 hours.    Pending Diagnostic Studies:     Procedure Component Value Units Date/Time    Immunofixation electrophoresis [[id number]] Collected:  06/29/17 1432    Order Status:  Sent Lab Status:  In process Updated:  06/29/17 1502    Specimen:  Blood from Blood     Narrative:       Collection has been rescheduled by SANLucero at 6/29/2017 14:24 32        Final Active Diagnoses:    Diagnosis Date Noted POA    PRINCIPAL PROBLEM:  Encephalopathy acute [G93.40] 06/27/2017 Yes    Late onset  Alzheimer's disease with behavioral disturbance [G30.1, F02.81] 05/20/2017 Yes     Chronic    Constipation [K59.00] 06/27/2013 Yes    Fasting hypoglycemia [E16.1] 06/28/2017 Yes    Coronary artery disease involving native coronary artery of native heart without angina pectoris [I25.10] 05/20/2017 Yes    Hypoalbuminemia due to protein-calorie malnutrition [E46] 06/27/2017 Yes    Rheumatoid arthritis involving both wrists with positive rheumatoid factor [M05.731, M05.732] 06/23/2013 Yes     Chronic    Hypomagnesemia [E83.42] 05/20/2017 Yes    Anemia of chronic disorder [D63.8] 06/24/2013 Yes     Chronic    Hypocalcemia [E83.51] 06/27/2017 Yes    Sinus pause [I45.5] 06/29/2017 Yes      Problems Resolved During this Admission:    Diagnosis Date Noted Date Resolved POA      Discharged Condition: fair    Disposition: Home-Health Care [facility] [place]    Follow Up:  Follow-up Information     Schedule an appointment as soon as possible for a visit with Pavel Power MD.    Specialty:  Internal Medicine  Why:  hospital follow up  Contact information:  [address] [address]  [phone]             [facility] In 1 day.    Specialties:  Home Health Services, Hospice Services  Why:  Home Health  Contact information:  [address] [place] [address]  [phone]             Jin Franco MD. Schedule an appointment as soon as possible for a visit in 1 week.    Specialty:  Rheumatology  Contact information:  [address] [address]  [phone]                 Patient Instructions:   No discharge procedures on file.  Medications:  Reconciled Home Medications:   Current Discharge Medication List      START taking these medications    Details   acetaminophen (TYLENOL) 650 MG TbSR Take 1 tablet (650 mg total) by mouth every 8 (eight) hours as needed (for pain uncontrolled by Naproxen).  Refills: 0      lidocaine (LIDODERM) 5 % Place 1 patch onto the skin  once daily. Remove & Discard patch within 12 hours or as directed by MD  Qty: 15 patch, Refills: 0      pantoprazole (PROTONIX) 40 MG tablet Take 1 tablet (40 mg total) by mouth once daily.  Qty: 15 tablet, Refills: 0      predniSONE (DELTASONE) 5 MG tablet Take 1 tablet (5 mg total) by mouth once daily.  Qty: 15 tablet, Refills: 0         CONTINUE these medications which have NOT CHANGED    Details   DOXYLAMINE SUCCINATE (SLEEP AID, DOXYLAMINE, ORAL) Take 1 tablet by mouth nightly as needed (for sleep).      aspirin (ECOTRIN) 81 MG EC tablet Take 1 tablet (81 mg total) by mouth once daily.  Refills: 0      cholecalciferol, vitamin D3, (VITAMIN D3) 5,000 unit Tab Take 10,000 Units by mouth once a week.      memantine (NAMENDA) 5 MG Tab TAKE 1 TABLET(5 MG) BY MOUTH EVERY DAY  Qty: 30 tablet, Refills: 0    Comments: **Patient requests 90 days supply**  Associated Diagnoses: Cognitive impairment      naproxen sodium (ANAPROX) 220 MG tablet Take 220 mg by mouth daily as needed (for pain).         STOP taking these medications       carvedilol (COREG) 3.125 MG tablet Comments:   Reason for Stopping:             Time spent on the discharge of patient: 30 minutes    Komal Clarke MD  Department of Hospital Medicine  [facility]

## 2017-06-30 NOTE — SUBJECTIVE & OBJECTIVE
"Interval History: "They don't hurt now but they always hurt". No acute overnight events. Pain markedly improved. Stable for discharge.    Review of Systems   Constitutional: Negative for chills, fatigue, fever and unexpected weight change.   HENT: Negative for ear pain, facial swelling, hearing loss, mouth sores, nosebleeds, rhinorrhea, sinus pressure, sore throat, tinnitus, trouble swallowing and voice change.    Eyes: Negative for photophobia, pain, redness and visual disturbance.   Respiratory: Negative for cough, chest tightness, shortness of breath and wheezing.    Cardiovascular: Negative for chest pain, palpitations and leg swelling.   Gastrointestinal: Negative for abdominal pain, blood in stool, constipation, diarrhea, nausea and vomiting.   Endocrine: Negative for cold intolerance, heat intolerance, polydipsia, polyphagia and polyuria.   Genitourinary: Negative for decreased urine volume, dysuria, flank pain, frequency, hematuria, menstrual problem, urgency, vaginal bleeding, vaginal discharge and vaginal pain.   Musculoskeletal: Positive for arthralgias. Negative for back pain, joint swelling, myalgias and neck pain.   Skin: Negative for rash.   Allergic/Immunologic: Negative for environmental allergies, food allergies and immunocompromised state.   Neurological: Negative for dizziness, seizures, syncope, weakness, light-headedness, numbness and headaches.   Hematological: Negative for adenopathy. Does not bruise/bleed easily.   Psychiatric/Behavioral: Negative for confusion, hallucinations, self-injury, sleep disturbance and suicidal ideas. The patient is not nervous/anxious.      Objective:     Vital Signs (Most Recent):  Temp: 98.6 °F (37 °C) (06/30/17 0747)  Pulse: 80 (06/30/17 0747)  Resp: 20 (06/30/17 0747)  BP: 138/73 (06/30/17 0747)  SpO2: 100 % (06/30/17 0747) Vital Signs (24h Range):  Temp:  [98 °F (36.7 °C)-98.6 °F (37 °C)] 98.6 °F (37 °C)  Pulse:  [] 80  Resp:  [16-20] 20  SpO2:  [95 " %-100 %] 100 %  BP: (117-147)/(58-80) 138/73     Weight: 72.6 kg (160 lb)  Body mass index is 35.87 kg/m².  No intake or output data in the 24 hours ending 06/30/17 0852   Physical Exam   Constitutional: Vital signs are normal. She appears well-developed.  Non-toxic appearance. No distress. She is sedated.   HENT:   Head: Normocephalic and atraumatic. Head is without right periorbital erythema and without left periorbital erythema.   Right Ear: Hearing, tympanic membrane, external ear and ear canal normal. No swelling.   Left Ear: Hearing, tympanic membrane, external ear and ear canal normal. No swelling.   Nose: Nose normal. No nasal deformity.   Mouth/Throat: Uvula is midline, oropharynx is clear and moist and mucous membranes are normal. No oropharyngeal exudate.   Eyes: Conjunctivae and lids are normal. Right eye exhibits no discharge and no exudate. Left eye exhibits no discharge and no exudate. Right conjunctiva is not injected. Left conjunctiva is not injected. No scleral icterus.   Neck: Normal range of motion. Neck supple. No hepatojugular reflux and no JVD present. No tracheal deviation present. No thyromegaly present.   Cardiovascular: Normal rate, regular rhythm, S1 normal, S2 normal, normal heart sounds and intact distal pulses.  Exam reveals no gallop and no friction rub.    No murmur heard.  Pulmonary/Chest: Effort normal and breath sounds normal. No accessory muscle usage or stridor. No tachypnea. No respiratory distress. She has no decreased breath sounds. She has no wheezes. She has no rales. She exhibits no tenderness.   Abdominal: Soft. Normal appearance and bowel sounds are normal. She exhibits no distension, no fluid wave and no mass. There is no tenderness. There is no rebound and no guarding.   Musculoskeletal: Normal range of motion. She exhibits edema (1+ of the bilateral lower extremities) and tenderness (bilatera knees).   Neurological: She is alert. She displays no tremor.   Oriented in  person and place   Skin: Skin is warm and dry. No lesion and no rash noted. She is not diaphoretic. No cyanosis or erythema. No pallor. Nails show no clubbing.   Psychiatric: She is slowed. Cognition and memory are impaired.   Nursing note and vitals reviewed.      Significant Labs:   BMP: No results for input(s): GLU, NA, K, CL, CO2, BUN, CREATININE, CALCIUM, MG in the last 48 hours.  CBC: No results for input(s): WBC, HGB, HCT, PLT in the last 48 hours.  POCT Glucose:     Recent Labs  Lab 06/29/17  1724 06/30/17  0734 06/30/17  0815   POCTGLUCOSE 104 68* 131*       Significant Imaging: I have reviewed all pertinent imaging results/findings within the past 24 hours.

## 2017-06-30 NOTE — ASSESSMENT & PLAN NOTE
Patient with hypotension and new sinus pause on ECG (POA). Resolved with discontinuation of Coreg.   1. Avoid beta blocker therapy in the future.  2. Outpatient follow up with PCP.

## 2017-06-30 NOTE — PLAN OF CARE
Met with pt and daughter. Pt more alert today. Medicare rights reviewed w pt/daughter. Copy of rights given to daughter. Daughter, Vidhya, wants to speak w Dr. MANCIA about agitation meds, hypoglycemia at times. Daughter , Vidhya, would like to be called before 11am at this number: [phone]  CM will notify Dr. MANCIA at [facility] -09:10  Dc plan: [facility] van ride home.  B: pending pt/ot eval and notes      PCP f/u made first available:  Future Appointments  Date Time Provider Department Center   7/19/2017 1:40 PM Pavel Power MD [facility]

## 2017-06-30 NOTE — PLAN OF CARE
9:41 AM  SW called RNRom[facility] to inform her SW will arrange transportation.  Arranged transportation through [facility] [phone]. [facility] will arrive at 12:00pm. Also faxed updated home health orders to [facility]. Will f/u with [facility] to indicate who they chose for home health.   9:46 AM  Sw called [facility], company chosen is [facility].   9:48 AM  Also called daughter to inform her that transportation has been set up for pt to go home today with home health. Left voicemail with daughter.     Roxi Kidd, CAR    [phone]

## 2017-06-30 NOTE — CONSULTS
Consult answered on 6/29. See initial note.   There is a complete staff note on that date from Dr. Carney.

## 2017-06-30 NOTE — PROGRESS NOTES
"[facility] Hospital Medicine  Progress Note    Patient Name: Kim Bah  MRN: [id number]  Patient Class: IP- Inpatient   Admission Date: 6/27/2017  Length of Stay: 2 days  Attending Physician: Komal Clarke MD  Primary Care Provider: Pavel Power MD    [facility] Medicine Team: [facility] HOSP MED L Komal Clarke MD    Subjective:     Principal Problem:Encephalopathy acute    HPI:  An 85-year-old woman, whose history is gathered by chart review and her daughter, with advanced dementia, CAD, previous recurrent UTI's, constipation and RA whom yesterday awoke from sleep screaming in pain. She could not specify quality, severity or location of her pain to her daughter who is her primary care taker. Per her daughter her screams were different from her usual pain screams and she was "not acting like herself". At baseline patient is oriented to person only and can specify what she is feeling. She was able to recognize her daughter and knew her name at the time of aforementioned events.     Further review of her chart reveals she has had screaming spells documented from 6/20/17.     Hospital Course:  Patient evaluated by ED physicians. She was combative at the time and was treated with diazepam. She was admitted to Hospital Medicine Team L for continued management of encephalopathy. She was seen by Neurology service with recommendations for analgesic therapy and probable diagnosis of pain induced delirium. Patient had poor oral intake which led to fasting hypoglycemia.     Patient encouraged and ate 50% of at least 2 meals. Blood glucose levels above 70 thereafter. Pain controlled with analgesics. Coreg therapy was discontinued as patient developed hypotension responsive to volume replacement and had evidence of a sinus pause on ECG. She was medically stable for discharge yet her daughter refusing to take her home as she was still experiencing pain. I performed further chart review and it " "appears as though patients mobility has been very limited for the past year due to pain. Discharge was held in favor of Rheumatology service evaluation and further pain control. Prednisone 5 mg daily was started with marked improvement in her pain. Rheumatology agreed with this and ordered additional diagnostic studies plus recommended outpatient follow up with her rheumatologist Dr. Franco ([facility]). The following morning her pain was significantly improved though not yet resolved and her mentation was at baseline. She was once again medically stable for discharge.     Interval History: "They don't hurt now but they always hurt". No acute overnight events. Pain markedly improved. Stable for discharge.    Review of Systems   Constitutional: Negative for chills, fatigue, fever and unexpected weight change.   HENT: Negative for ear pain, facial swelling, hearing loss, mouth sores, nosebleeds, rhinorrhea, sinus pressure, sore throat, tinnitus, trouble swallowing and voice change.    Eyes: Negative for photophobia, pain, redness and visual disturbance.   Respiratory: Negative for cough, chest tightness, shortness of breath and wheezing.    Cardiovascular: Negative for chest pain, palpitations and leg swelling.   Gastrointestinal: Negative for abdominal pain, blood in stool, constipation, diarrhea, nausea and vomiting.   Endocrine: Negative for cold intolerance, heat intolerance, polydipsia, polyphagia and polyuria.   Genitourinary: Negative for decreased urine volume, dysuria, flank pain, frequency, hematuria, menstrual problem, urgency, vaginal bleeding, vaginal discharge and vaginal pain.   Musculoskeletal: Positive for arthralgias. Negative for back pain, joint swelling, myalgias and neck pain.   Skin: Negative for rash.   Allergic/Immunologic: Negative for environmental allergies, food allergies and immunocompromised state.   Neurological: Negative for dizziness, seizures, syncope, weakness, light-headedness, numbness " and headaches.   Hematological: Negative for adenopathy. Does not bruise/bleed easily.   Psychiatric/Behavioral: Negative for confusion, hallucinations, self-injury, sleep disturbance and suicidal ideas. The patient is not nervous/anxious.      Objective:     Vital Signs (Most Recent):  Temp: 98.6 °F (37 °C) (06/30/17 0747)  Pulse: 80 (06/30/17 0747)  Resp: 20 (06/30/17 0747)  BP: 138/73 (06/30/17 0747)  SpO2: 100 % (06/30/17 0747) Vital Signs (24h Range):  Temp:  [98 °F (36.7 °C)-98.6 °F (37 °C)] 98.6 °F (37 °C)  Pulse:  [] 80  Resp:  [16-20] 20  SpO2:  [95 %-100 %] 100 %  BP: (117-147)/(58-80) 138/73     Weight: 72.6 kg (160 lb)  Body mass index is 35.87 kg/m².  No intake or output data in the 24 hours ending 06/30/17 0852   Physical Exam   Constitutional: Vital signs are normal. She appears well-developed.  Non-toxic appearance. No distress. She is sedated.   HENT:   Head: Normocephalic and atraumatic. Head is without right periorbital erythema and without left periorbital erythema.   Right Ear: Hearing, tympanic membrane, external ear and ear canal normal. No swelling.   Left Ear: Hearing, tympanic membrane, external ear and ear canal normal. No swelling.   Nose: Nose normal. No nasal deformity.   Mouth/Throat: Uvula is midline, oropharynx is clear and moist and mucous membranes are normal. No oropharyngeal exudate.   Eyes: Conjunctivae and lids are normal. Right eye exhibits no discharge and no exudate. Left eye exhibits no discharge and no exudate. Right conjunctiva is not injected. Left conjunctiva is not injected. No scleral icterus.   Neck: Normal range of motion. Neck supple. No hepatojugular reflux and no JVD present. No tracheal deviation present. No thyromegaly present.   Cardiovascular: Normal rate, regular rhythm, S1 normal, S2 normal, normal heart sounds and intact distal pulses.  Exam reveals no gallop and no friction rub.    No murmur heard.  Pulmonary/Chest: Effort normal and breath sounds  normal. No accessory muscle usage or stridor. No tachypnea. No respiratory distress. She has no decreased breath sounds. She has no wheezes. She has no rales. She exhibits no tenderness.   Abdominal: Soft. Normal appearance and bowel sounds are normal. She exhibits no distension, no fluid wave and no mass. There is no tenderness. There is no rebound and no guarding.   Musculoskeletal: Normal range of motion. She exhibits edema (1+ of the bilateral lower extremities) and tenderness (bilatera knees).   Neurological: She is alert. She displays no tremor.   Oriented in person and place   Skin: Skin is warm and dry. No lesion and no rash noted. She is not diaphoretic. No cyanosis or erythema. No pallor. Nails show no clubbing.   Psychiatric: She is slowed. Cognition and memory are impaired.   Nursing note and vitals reviewed.      Significant Labs:   BMP: No results for input(s): GLU, NA, K, CL, CO2, BUN, CREATININE, CALCIUM, MG in the last 48 hours.  CBC: No results for input(s): WBC, HGB, HCT, PLT in the last 48 hours.  POCT Glucose:     Recent Labs  Lab 06/29/17  1724 06/30/17  0734 06/30/17  0815   POCTGLUCOSE 104 68* 131*       Significant Imaging: I have reviewed all pertinent imaging results/findings within the past 24 hours.    Assessment/Plan:      * Encephalopathy acute    Acute encephalopathy likely due to pain induced delirium. Patient now appears to be at baseline.  1. Lidoderm patch.  2. Analgesics as below.  3. Delirium precautions.  4. Medically stable for discharge today.          Late onset Alzheimer's disease with behavioral disturbance    Chronic.   1. Outpatient management by PCP.  2. Long discussion with daughter over the phone about limitations for prescribing sedative. Patients daughter counseled to contact PCP for these medications or for outpatient psychiatry evaluation.         Constipation    Patient with documented bowel movement after enema, and lactulose therapy.    1. Continue stool  "softener.         Fasting hypoglycemia    Patient with fasting hypoglycemia likely secondary to poor oral intake. Suspect this may be contributing to her encephalopathy. Now resolved.   1. Ease dietary restriction in order to appeal to patients taste and increase chance of oral intake.  2. Boost plus supplement.          Coronary artery disease involving native coronary artery of native heart without angina pectoris    Stable and asymptomatic at this time. Patient only taking Coreg at home.  1. Aspirin 81 mg daily.  2. Discontinue Coreg due to below.            Hypoalbuminemia due to protein-calorie malnutrition    Secondary to her advanced dementia and poor oral intake.  1. Regular diet.  2. Beneprotein powder.  3. Boost supplement with meals.          Rheumatoid arthritis involving both wrists with positive rheumatoid factor    Stable. Per her son she has previously been on prednisone therapy however this was discontinued due to "shingles in her face close to her eye" which suspect may have been Herpes Zoster  1. Acetaminophen and lidocaine patch as needed for pain.  2. Scheduled naproxen.  3. Prednisone 5 mg daily.  4. Outpatient follow up with Dr. Franco.         Hypomagnesemia    Present on admission. Patient received magnesium sulfate replacement and now resolved.   1. Monitor and replace as needed.           Anemia of chronic disorder    Stable, chronic, and asymptomatic.   1. No indication for RBC transfusion.  2. Monitor.           Hypocalcemia    Pseudo-hypocalcemia due to low albumin.  1. No need to replace.          Sinus pause    Patient with hypotension and new sinus pause on ECG (POA). Resolved with discontinuation of Coreg.   1. Avoid beta blocker therapy in the future.  2. Outpatient follow up with PCP.          VTE Risk Mitigation         Ordered     enoxaparin injection 40 mg  Daily     Route:  Subcutaneous        06/27/17 0986          Komal Clarke MD  Department of Hospital Medicine "   [facility]

## 2017-06-30 NOTE — ASSESSMENT & PLAN NOTE
Acute encephalopathy likely due to pain induced delirium. Patient now appears to be at baseline.  1. Lidoderm patch.  2. Analgesics as below.  3. Delirium precautions.  4. Medically stable for discharge today.

## 2017-06-30 NOTE — PLAN OF CARE
Problem: Fall Risk (Adult)  Intervention: Monitor/Assist with Self Care   17   Functional Level Current   Ambulation 4 - completely dependent --    Transferring 4 - completely dependent --    Toileting 4 - completely dependent --    Bathing 4 - completely dependent --    Dressing 4 - completely dependent --    Eating 2 - assistive person --    Communication 0 - understands/communicates without difficulty --    Swallowing 0 - swallows foods/liquids without difficulty --    Activity   Activity Assistance Provided --  assistance, 1 person     Intervention: Reduce Risk/Promote Restraint Free Environment   17 [id number] 17   Safety Interventions   Environmental Safety Modification --  assistive device/personal items within reach;clutter free environment maintained;lighting adjusted;room near unit station;room organization consistent   Prevent  Drop/Fall   Safety/Security Measures --  bed alarm set   Safety Interventions   Safety Precautions emergency equipment at bedside --        Goal: Identify Related Risk Factors and Signs and Symptoms  Related risk factors and signs and symptoms are identified upon initiation of Human Response Clinical Practice Guideline (CPG)   Outcome: Ongoing (interventions implemented as appropriate)   17   Fall Risk   Related Risk Factors (Fall Risk) age-related changes;bladder function altered;confusion/agitation;fatigue/slow reaction;gait/mobility problems;environment unfamiliar   Signs and Symptoms (Fall Risk) presence of risk factors     Goal: Absence of Falls  Patient will demonstrate the desired outcomes by discharge/transition of care.   Outcome: Ongoing (interventions implemented as appropriate)   17   Fall Risk (Adult)   Absence of Falls making progress toward outcome       Comments: Fall precautions discussed with pt and daughter. Pt and daughter verbalized  Understanding of safety precautions and instructions. Bed in  low, locked position, call light within reach, bed alarm active and audiable. Pt remains free from falls this shift.

## 2017-07-03 ENCOUNTER — PATIENT MESSAGE (OUTPATIENT)
Dept: INTERNAL MEDICINE | Facility: CLINIC | Age: 82
End: 2017-07-03

## 2017-07-07 ENCOUNTER — TELEPHONE (OUTPATIENT)
Dept: INTERNAL MEDICINE | Facility: CLINIC | Age: 82
End: 2017-07-07

## 2017-07-07 NOTE — TELEPHONE ENCOUNTER
Home health nurse call and stated that the patient was seen in the ER for constipation and still have not had a bowel movement since.  gave verbal order for Fleets Enema every other day on June 22 and pt stills has not have any BM. also wanted to see pt in office pt does have a appt set up on 07/19. Please advise.

## 2017-07-07 NOTE — TELEPHONE ENCOUNTER
----- Message from Radha Hoang sent at 7/6/2017  1:50 PM CDT -----  Contact: Lachelle montez_  1st Request  _  2nd Request  _  3rd Request        Who: Lachelle    Why: Home health nurse call and stated that the patient was seen in the ER for constipation and still have not had a bowel movement since.     What Number to Call Back [phone]    When to Expect a call back: (With in 24 hours)

## 2017-07-10 NOTE — PHYSICIAN QUERY
PT Name: Kim Bah  MR #: [id number]    Physician Query Form - Neurological Condition Clarification       CDS/: Rebeka Lemon               Contact information:[email or web address]    This form is a permanent document in the medical record.     Query Date: July 10, 2017    By submitting this query, we are merely seeking further clarification of documentation. Please utilize your independent clinical judgment when addressing the question(s) below.    The Medical record contains the following:   Indicators   Supporting Clinical Findings Location in Medical Record   x AMS, Confusion, LOC, etc. Altered Mental Status        pt presents to the ed with alteration in mental status       ED note   X Acute / Chronic Illness Alzheimer's with behavoir HP  And  DS    Radiology Findings      Electrolyte Imbalance      Medication      Treatment     X Other Diagnosis of acute encephalopthy HP  And  DS  Progress note     Provider, please specify the diagnosis or diagnoses associated with above clinical findings.      [  ] Hepatic Encephalopathy    [  ] Hypertensive Encephalopathy    [  ] Metabolic Encephalopathy    [  ] Toxic Encephalopathy    [  ] Alzheimer's disease    [  ]  Rheumatoid arthritis    [  ] Other Encephalopathy    [  ] Unspecified Encephalopathy    [ x ] Other (please specify): ______pain induced delirium in the setting of advanced dementia_______________________________    [  ] Clinically Undetermined      Please document in your progress notes daily for the duration of treatment until resolved, and  include in your discharge summary.

## 2017-07-12 ENCOUNTER — PATIENT MESSAGE (OUTPATIENT)
Dept: INTERNAL MEDICINE | Facility: CLINIC | Age: 82
End: 2017-07-12

## 2017-07-12 NOTE — TELEPHONE ENCOUNTER
RX for lidocaine is not covered by insurance company. They recommend EMLA. Please advise and authorize.

## 2017-07-13 ENCOUNTER — TELEPHONE (OUTPATIENT)
Dept: INTERNAL MEDICINE | Facility: CLINIC | Age: 82
End: 2017-07-13

## 2017-07-13 DIAGNOSIS — N93.9 VAGINAL BLEEDING: Primary | ICD-10-CM

## 2017-07-13 NOTE — TELEPHONE ENCOUNTER
Spoke with Deana from [facility] yesterday to inform her PCP unavailable and we will have him to review order and fax back when he returns.

## 2017-07-13 NOTE — TELEPHONE ENCOUNTER
Per [employer] Lidocaine gel 2% and Lidocaine/Prilocaine topical cream 2.5/2.5 30cc is approved. It has to be just those two meds. Please review order and auth.

## 2017-07-13 NOTE — TELEPHONE ENCOUNTER
----- Message from Radha Fisher sent at 7/13/2017 11:32 AM CDT -----  Contact: Joe from [facility]  x_  1st Request  _  2nd Request  _  3rd Request        Who: Joe from [facility]    Why: Joe would like a call back regarding orders for patient that was faxed over. Please call     What Number to Call Back: [phone]    When to Expect a call back: (With in 24 hours)

## 2017-07-14 NOTE — TELEPHONE ENCOUNTER
Lachelle with  contacted the office stating patients daughter states she has been having a lot of vaginal bleeding.  nurse states it wasn't a lot when she took a look at it. It was a small amount in her diaper. It started a couple of days ago. Neither can confirm if its actual vaginal bleeding or blood in the urine. The daughter states its more of a discharge with blood but her urine does have a bad odor. No complaint of abdominal or back pain. No fever. Please advise.

## 2017-07-14 NOTE — TELEPHONE ENCOUNTER
Patient daughter informed of PCP advise and states she will bring her to the ED even though she has no other symptoms. She states its hard to  her mental status with her alzheimers and dementia. She had no further questions at this time.

## 2017-07-14 NOTE — TELEPHONE ENCOUNTER
If patient's baseline mental status has changed or is experiencing SOB, significant abdominal pain, dizziness, F/C, or significant bleeding she should be evaluated urgently at urgent care or ED.

## 2017-07-14 NOTE — TELEPHONE ENCOUNTER
----- Message from Lo Hardy sent at 7/14/2017 11:05 AM CDT -----  Contact: Lachelle with [facility]  X   1st Request  _  2nd Request  _  3rd Request        Who: Lachelle with [facility]    Why: Lachelle states the pt has some vaginal bleeding and she wanted to notify the clinical team. Please call to further discuss and advise, thanks!    What Number to Call Back: [phone]    When to Expect a call back: (With in 24 hours)

## 2017-07-17 ENCOUNTER — HOSPITAL ENCOUNTER (EMERGENCY)
Facility: HOSPITAL | Age: 82
Discharge: HOME | End: 2017-07-18
Attending: EMERGENCY MEDICINE
Payer: MEDICARE

## 2017-07-17 DIAGNOSIS — R31.9 HEMATURIA: Primary | ICD-10-CM

## 2017-07-17 DIAGNOSIS — K59.00 CONSTIPATION, UNSPECIFIED CONSTIPATION TYPE: ICD-10-CM

## 2017-07-17 DIAGNOSIS — N93.9 VAGINAL BLEEDING: ICD-10-CM

## 2017-07-17 DIAGNOSIS — N30.01 ACUTE CYSTITIS WITH HEMATURIA: ICD-10-CM

## 2017-07-17 LAB
BACTERIA #/AREA URNS AUTO: ABNORMAL /HPF
BILIRUB UR QL STRIP: NEGATIVE
BUN SERPL-MCNC: 30 MG/DL (ref 6–30)
CHLORIDE SERPL-SCNC: 106 MMOL/L (ref 95–110)
CLARITY UR REFRACT.AUTO: ABNORMAL
COLOR UR AUTO: ABNORMAL
CREAT SERPL-MCNC: 0.5 MG/DL (ref 0.5–1.4)
GLUCOSE SERPL-MCNC: 119 MG/DL (ref 70–110)
GLUCOSE UR QL STRIP: NEGATIVE
HCT VFR BLD CALC: 36 %PCV (ref 36–54)
HGB UR QL STRIP: ABNORMAL
HYALINE CASTS UR QL AUTO: 0 /LPF
KETONES UR QL STRIP: NEGATIVE
LEUKOCYTE ESTERASE UR QL STRIP: ABNORMAL
MICROSCOPIC COMMENT: ABNORMAL
NITRITE UR QL STRIP: POSITIVE
PH UR STRIP: 5 [PH] (ref 5–8)
POC IONIZED CALCIUM: 1.11 MMOL/L (ref 1.06–1.42)
POC TCO2 (MEASURED): 29 MMOL/L (ref 23–29)
POTASSIUM BLD-SCNC: 4.3 MMOL/L (ref 3.5–5.1)
PROT UR QL STRIP: ABNORMAL
RBC #/AREA URNS AUTO: >100 /HPF (ref 0–4)
SAMPLE: ABNORMAL
SODIUM BLD-SCNC: 140 MMOL/L (ref 136–145)
SP GR UR STRIP: 1.02 (ref 1–1.03)
SQUAMOUS #/AREA URNS AUTO: 2 /HPF
URN SPEC COLLECT METH UR: ABNORMAL
UROBILINOGEN UR STRIP-ACNC: 2 EU/DL
WBC #/AREA URNS AUTO: >100 /HPF (ref 0–5)
WBC CLUMPS UR QL AUTO: ABNORMAL

## 2017-07-17 PROCEDURE — 99285 EMERGENCY DEPT VISIT HI MDM: CPT | Mod: 25

## 2017-07-17 PROCEDURE — P9612 CATHETERIZE FOR URINE SPEC: HCPCS

## 2017-07-17 PROCEDURE — 81001 URINALYSIS AUTO W/SCOPE: CPT

## 2017-07-17 PROCEDURE — 99284 EMERGENCY DEPT VISIT MOD MDM: CPT | Mod: ,,, | Performed by: EMERGENCY MEDICINE

## 2017-07-17 RX ORDER — LIDOCAINE AND PRILOCAINE 25; 25 MG/G; MG/G
CREAM TOPICAL DAILY PRN
Qty: 30 G | Refills: 1 | Status: SHIPPED | OUTPATIENT
Start: 2017-07-17 | End: 2017-12-27

## 2017-07-18 VITALS
DIASTOLIC BLOOD PRESSURE: 62 MMHG | WEIGHT: 160 LBS | RESPIRATION RATE: 20 BRPM | TEMPERATURE: 99 F | SYSTOLIC BLOOD PRESSURE: 132 MMHG | BODY MASS INDEX: 35.87 KG/M2 | OXYGEN SATURATION: 98 % | HEART RATE: 63 BPM

## 2017-07-18 PROCEDURE — 87088 URINE BACTERIA CULTURE: CPT

## 2017-07-18 PROCEDURE — 25000003 PHARM REV CODE 250: Performed by: EMERGENCY MEDICINE

## 2017-07-18 PROCEDURE — 87077 CULTURE AEROBIC IDENTIFY: CPT

## 2017-07-18 PROCEDURE — 87086 URINE CULTURE/COLONY COUNT: CPT

## 2017-07-18 PROCEDURE — 87186 SC STD MICRODIL/AGAR DIL: CPT

## 2017-07-18 RX ORDER — PSEUDOEPHEDRINE/ACETAMINOPHEN 30MG-500MG
100 TABLET ORAL
Status: DISCONTINUED | OUTPATIENT
Start: 2017-07-18 | End: 2017-07-18

## 2017-07-18 RX ORDER — AMOXICILLIN AND CLAVULANATE POTASSIUM 875; 125 MG/1; MG/1
1 TABLET, FILM COATED ORAL 2 TIMES DAILY
Qty: 14 TABLET | Refills: 0 | Status: SHIPPED | OUTPATIENT
Start: 2017-07-18 | End: 2017-11-08

## 2017-07-18 RX ORDER — SYRING-NEEDL,DISP,INSUL,0.3 ML 29 G X1/2"
300 SYRINGE, EMPTY DISPOSABLE MISCELLANEOUS
Status: DISCONTINUED | OUTPATIENT
Start: 2017-07-18 | End: 2017-07-18

## 2017-07-18 RX ORDER — AMOXICILLIN AND CLAVULANATE POTASSIUM 875; 125 MG/1; MG/1
1 TABLET, FILM COATED ORAL
Status: COMPLETED | OUTPATIENT
Start: 2017-07-18 | End: 2017-07-18

## 2017-07-18 RX ADMIN — AMOXICILLIN AND CLAVULANATE POTASSIUM 1 TABLET: 875; 125 TABLET, FILM COATED ORAL at 01:07

## 2017-07-18 NOTE — ED PROVIDER NOTES
Encounter Date: 2017    SCRIBE #1 NOTE: I, Portillo Preston, am scribing for, and in the presence of,  Dr. Powell. I have scribed the following portions of the note - the Resident attestation.       History     Chief Complaint   Patient presents with    Vaginal Bleeding     Vaginal bleeding x3 days, small amount.     Ms Bah is an 86 yo woman w/ PMH significant for rheumatoid arthritis and previous UTI and total hysterectomy. Pt is mildly demented so history was taken from her granddaughter. Granddaughter states that pt has had two episodes of unwitnessed vaginal bleeding. Granddaughter states that she has seen blood twice in pt's adult diaper in the last four days. Pt has not had a BM in the past week. Granddaughter took photos of the blood found in the adult diaper. On examination of photo, there were dime-sized clots present.     Pt is unaware of any episodes of blood in her adult diaper.             Review of patient's allergies indicates:   Allergen Reactions    Tramadol Other (See Comments)     Hallucinations; psychosis     Past Medical History:   Diagnosis Date    Coronary artery disease involving native coronary artery of native heart without angina pectoris 2017    Dementia     Difficult intubation     Rheumatoid arthritis(714.0)     follows with Dr. Dayron Babb     Right Sided     Thyroid disease     thyroidectomy 20 years ago     Past Surgical History:   Procedure Laterality Date     SECTION      EYE SURGERY      FRACTURE SURGERY      right femur with pin implants 2013    HERNIA REPAIR      HYSTERECTOMY      THYROIDECTOMY       Family History   Problem Relation Age of Onset    Cancer Maternal Aunt      Social History   Substance Use Topics    Smoking status: Never Smoker    Smokeless tobacco: Never Used    Alcohol use No     Review of Systems   Unable to perform ROS: Dementia       Physical Exam     Initial Vitals [17]   BP Pulse Resp Temp SpO2    122/74 (!) 134 18 98.8 °F (37.1 °C) 98 %      MAP       90         Physical Exam    Nursing note and vitals reviewed.  Constitutional: She appears well-developed and well-nourished.   HENT:   Mouth/Throat: Oropharynx is clear and moist.   Eyes: Conjunctivae are normal.   Neck: No JVD present.   Cardiovascular: Normal rate, regular rhythm and intact distal pulses.   Pulmonary/Chest: Breath sounds normal. She has no wheezes. She has no rales.   Abdominal: Soft. Bowel sounds are normal. There is no tenderness.   Genitourinary: Vagina normal.   Genitourinary Comments: Digital examination of the vagina shows no gross blood   Musculoskeletal: Normal range of motion. She exhibits no edema.   Neurological: She is alert. She has normal strength. No cranial nerve deficit.   Skin: Capillary refill takes less than 2 seconds.         ED Course   Procedures  Labs Reviewed   URINALYSIS, REFLEX TO URINE CULTURE - Abnormal; Notable for the following:        Result Value    Appearance, UA Cloudy (*)     Protein, UA 2+ (*)     Occult Blood UA 3+ (*)     Nitrite, UA Positive (*)     Leukocytes, UA 3+ (*)     All other components within normal limits    Narrative:     Preferred Collection Type->Urine, Clean Catch   URINALYSIS MICROSCOPIC - Abnormal; Notable for the following:     RBC, UA >100 (*)     WBC, UA >100 (*)     WBC Clumps, UA Many (*)     Bacteria, UA Many (*)     All other components within normal limits    Narrative:     Preferred Collection Type->Urine, Clean Catch   ISTAT PROCEDURE - Abnormal; Notable for the following:     POC Glucose 119 (*)     All other components within normal limits   CULTURE, URINE   ISTAT CHEM8             Medical Decision Making:   History:   I obtained history from: someone other than patient.       <> Summary of History: Spoke with pt's daughter who noted blood with clots in pt's adult diapers. Daughter states that there have been two episodes of bloody.   Old Medical Records: I decided to  obtain old medical records.  Old Records Summarized: records from previous admission(s).       <> Summary of Records: Previous admissions state that the pt was recently admitted for encephalopathy evaluated as late onset alzheimer's.  Initial Assessment:   Ms Bah is an 84 yo woman w/ PMH significant for rheumatoid arthritis and previous UTI and total hysterectomy here for evaluation of possible vaginal bleeding   Differential Diagnosis:   Urinary tract infection   Hemorrhagic cystitis  Cervical cancer   Vaginal atrophy   Clinical Tests:   Lab Tests: Ordered and Reviewed  ED Management:  Performed digital vaginal exam and digital rectal exams. No blood was found during either exam.  Bleeding and clots could be secondary to hemorrhagic cystitis.  UA and labs ordered.    Labs reviewed which showed greater than RBCs, the RBCs, and many bacteria, nitrite positive.  We'll treat with Augmentin.  Urine culture ordered and will review for susceptibilities.            Scribe Attestation:   Scribe #1: I performed the above scribed service and the documentation accurately describes the services I performed. I attest to the accuracy of the note.    Attending Attestation:   Physician Attestation Statement for Resident:  As the supervising MD   Physician Attestation Statement: I have personally seen and examined this patient.   I agree with the above history. -: This is a 85 y.o. female who presents with a 3 day history of vaginal bleeding with a PSHx of total hysterectomy. Will do pelvic exam and also a UA with straight cath as this may be secondary to a urologic issue.     1:35 am  UA returned significant for nitrite positive UTI. On medical record review previous urine cultures have been negative for growth. Will discharge on Augmentin. Family member at bedside concerned the pt has not had any bowel movements recently. Fleet enema ordered. Pt's abdomen is soft and non-tender so I don't think she requires a brown bomb enema  at this time. Pt is stable for discharge.    As the supervising MD I agree with the above PE.    As the supervising MD I agree with the above treatment, course, plan, and disposition.          Physician Attestation for Scribe:  Physician Attestation Statement for Scribe #1: I, Dr. Powell, reviewed documentation, as scribed by Portillo Preston in my presence, and it is both accurate and complete.                 ED Course     Clinical Impression:   The primary encounter diagnosis was Hematuria. Diagnoses of Vaginal bleeding, Acute cystitis with hematuria, and Constipation, unspecified constipation type were also pertinent to this visit.    Disposition:   Disposition: Discharged  Condition: Stable                        Lucy Powell MD  07/19/17 0380

## 2017-07-18 NOTE — ED NOTES
Two patient identifiers checked and confirmed.    APPEARANCE: Resting comfortably in no acute distress. Patient has clean hair, skin and nails. Clothing is appropriate and properly fastened.  NEURO: Awake, alert, appropriate for age, and cooperative with a calm affect; pupils equal and round.Pt only oriented to self.  HEENT: Head symmetrical. Bilateral eyes without redness or drainage.  CARDIAC: Regular rate and rhythm.  RESPIRATORY: Airway is open and patent. Respirations are spontaneous on room air. Normal respiratory effort and rate noted.  GI/: Abdomen soft and non-distended.  Family reports pt bleeding bright red blood vaginally with quarter sized clots x4 days saturating 2 diapers per day. Pt incontinent to bladder and stool. Pt has not had bowel moment x1 week.  NEUROVASCULAR: All extremities are warm and pink with +2 pulses and capillary refill less than 3 seconds.  MUSCULOSKELETAL: Moves all extremities well; no obvious deformities noted. Pt has weakness to bilateral extremities and does not walk at home.  SKIN: Warm and dry, adequate turgor, mucus membranes moist and pink

## 2017-07-18 NOTE — ED TRIAGE NOTES
Family reports pt bleeding vaginally x4 days. Reports blood is bright red with quarter sized  Clots. Family reports pt saturating 2 diapers per day. Pt denies weakness or dizziness.  Family also reports no bowel movement x1 week.

## 2017-07-19 NOTE — TELEPHONE ENCOUNTER
----- Message from Arsenio Garcia sent at 7/19/2017  2:17 PM CDT -----  Contact: Brandan, patient's daughter  X_  1st Request  _  2nd Request  _  3rd Request        Who: Reirowan, patient's daughter    Why: Patient's daughter has questions about her care. Would not give any more information. Please call back to follow up.    What Number to Call Back: [phone] (before 4:30, patient's work No.) or [phone]    When to Expect a call back: (With in 24 hours)

## 2017-07-20 LAB — BACTERIA UR CULT: NORMAL

## 2017-07-21 ENCOUNTER — TELEPHONE (OUTPATIENT)
Dept: INTERNAL MEDICINE | Facility: CLINIC | Age: 82
End: 2017-07-21

## 2017-07-21 ENCOUNTER — PATIENT MESSAGE (OUTPATIENT)
Dept: INTERNAL MEDICINE | Facility: CLINIC | Age: 82
End: 2017-07-21

## 2017-07-21 NOTE — TELEPHONE ENCOUNTER
Spoke with patient's daughter.  Pt is bed bound currently.  Please write up an order/paperwork for air mattress and I will sign.

## 2017-07-21 NOTE — TELEPHONE ENCOUNTER
----- Message from Radha Fisher sent at 7/21/2017  2:47 PM CDT -----  Contact: Alicia Parker (Daughter)     Patient daughter states,     Hi doctor Tono I called to request a conference call with you when available in reference to the my mom health care requirements. Also, I sent a prescription renewal  for presidone.  Can you please give me a call at [phone] I'm here until 4:30 after that time I can be reached at [phone]. She also would like orders for  pressure mattress for patient hospital bed.     Thanks

## 2017-07-21 NOTE — TELEPHONE ENCOUNTER
Sadi Power I called to request a conference call with you when available in reference to the my mom health care requirements. Also, I sent a prescription renewal  for presidone.  Can you please give me a call at [phone] I'm here until 4:30 after that time I can be reached at [phone]. She also would like orders for  pressure mattress for patient hospital bed.     Thanks

## 2017-07-25 ENCOUNTER — TELEPHONE (OUTPATIENT)
Dept: INTERNAL MEDICINE | Facility: CLINIC | Age: 82
End: 2017-07-25

## 2017-07-25 NOTE — TELEPHONE ENCOUNTER
----- Message from Tila Martinez sent at 7/25/2017  2:46 PM CDT -----  Contact: vin with [facility] [phone]  _  1st Request  _  2nd Request  _  3rd Request    Please refill the medication(s) listed below. Please call the patient when the prescription(s) is ready for  at the phone number (___)(___-_____) .vin with [facility] [phone] ext [phone]    Medication #1lidocaine-prilocaine (EMLA) cream - needs PA    Medication #2      Preferred Pharmacy:vin with [facility] [phone] ext [phone]

## 2017-07-26 ENCOUNTER — PATIENT MESSAGE (OUTPATIENT)
Dept: INTERNAL MEDICINE | Facility: CLINIC | Age: 82
End: 2017-07-26

## 2017-07-27 NOTE — TELEPHONE ENCOUNTER
"Thanks for forwarding this.  I have already completed the Rx and I think the paperwork has been submitted.  I"m not aware of a way to expedite the process further.    "

## 2017-08-01 ENCOUNTER — TELEPHONE (OUTPATIENT)
Dept: INTERNAL MEDICINE | Facility: CLINIC | Age: 82
End: 2017-08-01

## 2017-08-01 NOTE — TELEPHONE ENCOUNTER
I believe pt already has home health at present.  Please contact her home health to give verbal order for wound care evaluation and treatment.

## 2017-08-01 NOTE — TELEPHONE ENCOUNTER
----- Message from Tila Martinez sent at 8/1/2017  9:02 AM CDT -----  Contact: james with [facility] [phone]  _  1st Request  _  2nd Request  _  3rd Request        Who: james with [facility] [phone]    Why: pt is having diarrhea and sores on her bottom. Please call jmaes    What Number to Call Back:james with [facility] [phone]    When to Expect a call back: (With in 24 hours)

## 2017-08-03 ENCOUNTER — PATIENT MESSAGE (OUTPATIENT)
Dept: INTERNAL MEDICINE | Facility: CLINIC | Age: 82
End: 2017-08-03

## 2017-08-03 NOTE — TELEPHONE ENCOUNTER
----- Message from Arsenio Garcia sent at 8/3/2017  2:05 PM CDT -----  [facility]  X_  1st Request  _  2nd Request  _  3rd Request        Who: Naty, [facility]    Why: Patient is requesting a pressure mattress. Need orders, clinical notes and medical necessity form. Fax No. [phone].  Please call back to follow up.    What Number to Call Back: [phone] (call center)    When to Expect a call back: (With in 24 hours)

## 2017-08-17 NOTE — TELEPHONE ENCOUNTER
----- Message from Jamie Parker sent at 8/17/2017 11:54 AM CDT -----  Contact: Deana with [facility]  x_ 1st Request   _ 2nd Request   _ 3rd Request     Who: ADELA LAU [[id number]]    Why: Deana is requesting a call back in reference to paperwork she sent for signature.    What Number to Call Back: [phone]    When to Expect a call back: (Before the end of the day)   -- if call after 3:00 call back will be tomorrow.

## 2017-09-26 ENCOUNTER — TELEPHONE (OUTPATIENT)
Dept: INTERNAL MEDICINE | Facility: CLINIC | Age: 82
End: 2017-09-26

## 2017-09-26 DIAGNOSIS — R30.0 DYSURIA: Primary | ICD-10-CM

## 2017-09-26 DIAGNOSIS — S31.000S SACRAL WOUND, SEQUELA: ICD-10-CM

## 2017-09-26 NOTE — TELEPHONE ENCOUNTER
Orders fax and pt daughter wants me to give her a call back on tomorrow so we can further discuss issues and concerns she have.

## 2017-09-26 NOTE — TELEPHONE ENCOUNTER
----- Message from Tila Martinez sent at 9/26/2017 12:30 PM CDT -----  Contact: james with [facility] [phone]  _  1st Request  _  2nd Request  _  3rd Request        Who: james with [facility] [phone]    Why: pt may have urinary tract infection. Would like to have something called in for it.  Pt also has two small wounds on sacrum. It was going away now it is coming back. Please call james    What Number to Call Back:james with [facility] [phone]    When to Expect a call back: (Before the end of the day)   -- if the call is after 12:00, the call back will be tomorrow.

## 2017-09-26 NOTE — TELEPHONE ENCOUNTER
Please call and send order for urinalysis and culture..  She will likely need to come into the wound care clinic for further evaluation.

## 2017-09-27 NOTE — TELEPHONE ENCOUNTER
Spoke with lab at [facility] and she states they receive orders for U/A and U/C for Pt.Daughter is suppose to call back so we can further discuss scheduling for Wound Care.

## 2017-09-28 ENCOUNTER — TELEPHONE (OUTPATIENT)
Dept: INTERNAL MEDICINE | Facility: CLINIC | Age: 82
End: 2017-09-28

## 2017-09-28 DIAGNOSIS — N39.0 URINARY TRACT INFECTION WITH HEMATURIA, SITE UNSPECIFIED: Primary | ICD-10-CM

## 2017-09-28 DIAGNOSIS — R31.9 URINARY TRACT INFECTION WITH HEMATURIA, SITE UNSPECIFIED: Primary | ICD-10-CM

## 2017-09-28 RX ORDER — NITROFURANTOIN (MACROCRYSTALS) 100 MG/1
100 CAPSULE ORAL EVERY 12 HOURS
Qty: 14 CAPSULE | Refills: 0 | Status: SHIPPED | OUTPATIENT
Start: 2017-09-28 | End: 2017-10-05

## 2017-09-28 NOTE — TELEPHONE ENCOUNTER
Spoke to Zuir at [facility], culture is pended, and she will notify once complete.

## 2017-09-28 NOTE — TELEPHONE ENCOUNTER
Zuri called and states that the pt's UA came back with occult blood 3 +, nitrite 2+, leukocytes positive, and WBC packed. See lab results on your desk. Please advise and authorize.

## 2017-09-28 NOTE — TELEPHONE ENCOUNTER
----- Message from Arsenio Garcia sent at 9/28/2017  9:15 AM CDT -----  Contact: Zuri from [facility]  X_  1st Request  _  2nd Request  _  3rd Request        Who: Zuri from [facility]    Why: Called concerning results for patient's UA and the culture is still pending. Initial results have been faxed. Please call back to follow up.  Please return the call at earliest convenience. Thanks!    What Number to Call Back: [phone]    When to Expect a call back: (Within 24 hours)

## 2017-09-28 NOTE — TELEPHONE ENCOUNTER
I sent in macrobid, hopefully the culture is pending I cannot tell, if not we need to collect and send for culture.  Also, she needs to have her wound evaluated in a clinic, I cannot make recommendation nor can anyone without seeing the wound and the wound clinic is the best option.  See if they can get in on the [place] if that's easier.

## 2017-09-29 NOTE — TELEPHONE ENCOUNTER
Notified patient's daughter that the HH orders were signed and I was going to fax them over.     Faxed orders to [facility].

## 2017-10-02 ENCOUNTER — TELEPHONE (OUTPATIENT)
Dept: PODIATRY | Facility: CLINIC | Age: 82
End: 2017-10-02

## 2017-10-02 NOTE — TELEPHONE ENCOUNTER
Call placed to preferred number re wound care referral.  No answer.  Message left re nature of call and contact info to return my call.

## 2017-10-03 ENCOUNTER — TELEPHONE (OUTPATIENT)
Dept: INTERNAL MEDICINE | Facility: CLINIC | Age: 82
End: 2017-10-03

## 2017-10-03 DIAGNOSIS — R26.2 INABILITY TO AMBULATE DUE TO MULTIPLE JOINTS: ICD-10-CM

## 2017-10-03 DIAGNOSIS — G30.9 ALZHEIMER'S DEMENTIA WITHOUT BEHAVIORAL DISTURBANCE, UNSPECIFIED TIMING OF DEMENTIA ONSET: Primary | ICD-10-CM

## 2017-10-03 DIAGNOSIS — F02.80 ALZHEIMER'S DEMENTIA WITHOUT BEHAVIORAL DISTURBANCE, UNSPECIFIED TIMING OF DEMENTIA ONSET: Primary | ICD-10-CM

## 2017-10-03 NOTE — TELEPHONE ENCOUNTER
Contacted pt's daughter and she states that she never received the macrodantin for the pt. Recalled in the script to joe. Informed pt's daughter that the pt needs to f/u with wound care to treat the pressure ulcer. Pt's daughter states that the pt is bed ridden and the only way she can make an appt is by ambulance. Pt's daughter is requesting help setting up an ambulance to take the pt to future appts with wound care and a new PCP. Reached out to Ann [facility] Ohara to help schedule wound care appt.

## 2017-10-03 NOTE — TELEPHONE ENCOUNTER
Pt's daughter is requesting a new abx for the pt's UTI and is stating that the abx Macrobid did not treat the UTI fully. Please advise and authorize.

## 2017-10-03 NOTE — TELEPHONE ENCOUNTER
Referral in for case management, please set her up with pcp and wound care on same day at [facility] or anywhere they can do wound care and pcp establish

## 2017-10-03 NOTE — TELEPHONE ENCOUNTER
Spoke with Zuri and she states verbally Klepsiella is the bacteria that pt has and  It is  sensitive to the med Macrodantin that was prescirbed for pt and needs a replacement. Spoke with Carlos supervisor and he states for me to inform pt and [facility] that pt needs to come in for New Pt appt so we can get her UTI under controlled. Spoke with pt daughter to inform her of results and rec but pt daughter states she would like to speak to supervisor in regards to this matter. Will give Carlos the message and number to contact pt family

## 2017-10-03 NOTE — TELEPHONE ENCOUNTER
----- Message from Lo Hardy sent at 10/2/2017  9:17 AM CDT -----  Contact: Zuri with [facility]  x  1st Request  _  2nd Request  _  3rd Request        Who: Zuri with [facility]    Why: Requesting a call back in regards to lab results that were faxed regarding the urine test on the pt.    Please return the call at earliest convenience. Thanks!    What Number to Call Back: [phone]    When to Expect a call back: (Within 24 hours)                              '

## 2017-10-09 ENCOUNTER — OUTPATIENT CASE MANAGEMENT (OUTPATIENT)
Dept: ADMINISTRATIVE | Facility: OTHER | Age: 82
End: 2017-10-09

## 2017-10-09 ENCOUNTER — TELEPHONE (OUTPATIENT)
Dept: INTERNAL MEDICINE | Facility: CLINIC | Age: 82
End: 2017-10-09

## 2017-10-09 NOTE — TELEPHONE ENCOUNTER
----- Message from Carmen May sent at 10/9/2017  3:06 PM CDT -----  Please note the following patient's information has been forwarded to [facility] for case mgmt or  by Outpatient Case Management.     Please see the media section of patient's chart for additional details.     Please contact Ext. [phone] with any questions.     Thank you,     Carmen May, SSC

## 2017-10-09 NOTE — PROGRESS NOTES
Please note the following patient's information has been forwarded to [facility] for case mgmt or  by Outpatient Case Management.    Please see the media section of patient's chart for additional details.    Please contact Ext. [phone] with any questions.    Thank you,    Carmen May, SSC

## 2017-10-10 ENCOUNTER — TELEPHONE (OUTPATIENT)
Dept: INTERNAL MEDICINE | Facility: CLINIC | Age: 82
End: 2017-10-10

## 2017-10-16 ENCOUNTER — TELEPHONE (OUTPATIENT)
Dept: INTERNAL MEDICINE | Facility: CLINIC | Age: 82
End: 2017-10-16

## 2017-10-16 NOTE — TELEPHONE ENCOUNTER
Spoke with pt daughter and she states that she has a Dr coming out to the home for her mom that is in our [facility] sys but she does not remember dr  name.

## 2017-10-23 ENCOUNTER — TELEPHONE (OUTPATIENT)
Dept: INTERNAL MEDICINE | Facility: CLINIC | Age: 82
End: 2017-10-23

## 2017-11-01 ENCOUNTER — TELEPHONE (OUTPATIENT)
Dept: INTERNAL MEDICINE | Facility: CLINIC | Age: 82
End: 2017-11-01

## 2017-11-01 NOTE — TELEPHONE ENCOUNTER
Please see how the patient is doing today. She does need to be evaluated soon. Can home health collect urine studies or labs on her?    Thanks,  KJ

## 2017-11-01 NOTE — TELEPHONE ENCOUNTER
----- Message from Madina Del Valle sent at 11/1/2017 12:50 PM CDT -----  Contact: Lachelle/ [facility] [phone]  Over the weekend, daughter called to say pt was breathing hard and heavy. They sent a nurse to check on her and she seemed dehydrated Pt c/o pain and a cloudy drainage in her left eye. Pt said that she can see but that it still bothers her. Pt was advised by nurse to continue pushing more fluids.    If pt could be seen sooner, it would be appreciated.

## 2017-11-01 NOTE — TELEPHONE ENCOUNTER
Patient daughter stated that her mother is still breathing heavy , and all of her Vitals are normal .Also that she is giving her mother water and electrolytes daily but she still thinks she is dehydrated .    Reached out to [facility] and informed on call nurse to please submit UA , UA Culture , CBC , CMP. Orders will be collected first thing in the AM.    If possible can patient be seen on 4th ?

## 2017-11-06 ENCOUNTER — TELEPHONE (OUTPATIENT)
Dept: INTERNAL MEDICINE | Facility: CLINIC | Age: 82
End: 2017-11-06

## 2017-11-06 NOTE — TELEPHONE ENCOUNTER
Spoke with patient daughter and she informed me that her mother is still the same dehydrated .   Patient daughter would like to know what the labs show .  Patient daughter also stated that Saturday would be best for home visit.

## 2017-11-06 NOTE — TELEPHONE ENCOUNTER
Please see if family will allow a home visit this Saturday 11/11. How is the patient doing?     Thanks,  KJ

## 2017-11-06 NOTE — TELEPHONE ENCOUNTER
PCP called patient's daughter to inform her that labs showed dehydration, anemia, but no infection in her blood. Her urine study showed that she may have a UTI, but it is unclear whether sterile technique was used for collection because there were many epithelial cells in the urinalysis.    Patient's daughter agreed to an 8:30 home visit on Sat morning.    Hafsa- please call home health to repeat the urinalysis and culture using the best sterile technique they can (clean catch urine collection).    Thanks,  KJ

## 2017-11-06 NOTE — TELEPHONE ENCOUNTER
Called  home health and spoke with Zuri  to repeat the urinalysis and culture using the best sterile technique they can (clean catch urine collection).  Zuri informed me that they used an in and out cath to collect sample and they have been informed to re collect sample.

## 2017-11-08 ENCOUNTER — TELEPHONE (OUTPATIENT)
Dept: INTERNAL MEDICINE | Facility: CLINIC | Age: 82
End: 2017-11-08

## 2017-11-08 DIAGNOSIS — N30.01 ACUTE CYSTITIS WITH HEMATURIA: Primary | ICD-10-CM

## 2017-11-08 RX ORDER — SULFAMETHOXAZOLE AND TRIMETHOPRIM 400; 80 MG/1; MG/1
1 TABLET ORAL 2 TIMES DAILY
Qty: 14 TABLET | Refills: 0 | Status: SHIPPED | OUTPATIENT
Start: 2017-11-08 | End: 2017-11-22

## 2017-11-08 NOTE — TELEPHONE ENCOUNTER
Patient's second urine study definitely shows a UTI. I started bactrim twice daily for one week to joe in [place].    She was previously on augmentin in 7/2017.    Thanks,  DAVIE

## 2017-11-09 NOTE — TELEPHONE ENCOUNTER
Voice mail has been left informing patient daughter  that her mom needs to eat before taking the bactrim, even if its yogurt or some crackers. Also if  her mom is not eating then she needs to stop taking naproxen and aspirin.    Patient daughter has been advised to please reach out to us if she needs anything at all .

## 2017-11-09 NOTE — TELEPHONE ENCOUNTER
Please advise the daughter that her mom needs to eat before taking the bactrim, even if its yogurt or some crackers.    If her mom is npt eating then she needs to stop taking naproxen and aspirin.    Thanks,  DAVIE

## 2017-11-09 NOTE — TELEPHONE ENCOUNTER
Patient daughter has been informed that her mother has a UTI and that she is to continue to take the bactrim twice daily for one week .  Patient daughter stated that this Saturday would be fine for 8 Am due to her going to work .  Patient daughter stated that her mother is not eating and she has been force feeding her .

## 2017-11-10 ENCOUNTER — TELEPHONE (OUTPATIENT)
Dept: INTERNAL MEDICINE | Facility: CLINIC | Age: 82
End: 2017-11-10

## 2017-11-10 NOTE — TELEPHONE ENCOUNTER
----- Message from Madina Todd sent at 11/10/2017  4:01 PM CST -----  Contact: [facility]/hansel/[phone]   [facility] called in regards to the pt has a uti. They took 2 test and the pt does have a uti. She faxed the results over.        Please advise

## 2017-11-10 NOTE — TELEPHONE ENCOUNTER
Called and informed Zuri that patient has been started on sulfamethoxazole-trimethoprim 400-80mg  And is to take twice a day for 1 week .

## 2017-11-11 ENCOUNTER — OFFICE VISIT (OUTPATIENT)
Dept: INTERNAL MEDICINE | Facility: CLINIC | Age: 82
End: 2017-11-11
Payer: MEDICARE

## 2017-11-11 DIAGNOSIS — G30.1 LATE ONSET ALZHEIMER'S DISEASE WITH BEHAVIORAL DISTURBANCE: Chronic | ICD-10-CM

## 2017-11-11 DIAGNOSIS — G30.9 ALZHEIMER'S DEMENTIA WITHOUT BEHAVIORAL DISTURBANCE, UNSPECIFIED TIMING OF DEMENTIA ONSET: ICD-10-CM

## 2017-11-11 DIAGNOSIS — E86.0 DEHYDRATION: ICD-10-CM

## 2017-11-11 DIAGNOSIS — I25.10 CORONARY ARTERY DISEASE INVOLVING NATIVE CORONARY ARTERY OF NATIVE HEART WITHOUT ANGINA PECTORIS: ICD-10-CM

## 2017-11-11 DIAGNOSIS — F02.818 LATE ONSET ALZHEIMER'S DISEASE WITH BEHAVIORAL DISTURBANCE: Chronic | ICD-10-CM

## 2017-11-11 DIAGNOSIS — F02.80 ALZHEIMER'S DEMENTIA WITHOUT BEHAVIORAL DISTURBANCE, UNSPECIFIED TIMING OF DEMENTIA ONSET: ICD-10-CM

## 2017-11-11 DIAGNOSIS — R26.2 INABILITY TO AMBULATE DUE TO MULTIPLE JOINTS: ICD-10-CM

## 2017-11-11 DIAGNOSIS — N30.01 ACUTE CYSTITIS WITH HEMATURIA: ICD-10-CM

## 2017-11-11 PROCEDURE — 99999 PR PBB SHADOW E&M-EST. PATIENT-LVL III: CPT | Mod: PBBFAC,,, | Performed by: INTERNAL MEDICINE

## 2017-11-11 PROCEDURE — 99350 HOME/RES VST EST HIGH MDM 60: CPT | Mod: ICN,,, | Performed by: INTERNAL MEDICINE

## 2017-11-11 PROCEDURE — 99499 UNLISTED E&M SERVICE: CPT | Mod: S$PBB,,, | Performed by: INTERNAL MEDICINE

## 2017-11-11 NOTE — Clinical Note
Please fax the palliative care order to the number I gave you on Friday, ATTN Trice. I'm not done with this note, but you can fax her that too soon. Also fax face sheet.  Thanks! DAVIE ESTRELLA, ACNS-BC, [facility] Director of Palliative Care [facility] [place] [id number] [facility] [address] [facility] office [phone] [email or web address]

## 2017-11-13 ENCOUNTER — TELEPHONE (OUTPATIENT)
Dept: INTERNAL MEDICINE | Facility: CLINIC | Age: 82
End: 2017-11-13

## 2017-11-13 VITALS — SYSTOLIC BLOOD PRESSURE: 113 MMHG | HEART RATE: 100 BPM | TEMPERATURE: 98 F | DIASTOLIC BLOOD PRESSURE: 63 MMHG

## 2017-11-13 PROBLEM — B99.9 INFECTIOUS ENCEPHALOPATHY: Status: RESOLVED | Noted: 2017-02-08 | Resolved: 2017-11-13

## 2017-11-13 PROBLEM — G93.49 INFECTIOUS ENCEPHALOPATHY: Status: RESOLVED | Noted: 2017-02-08 | Resolved: 2017-11-13

## 2017-11-13 PROBLEM — E86.0 DEHYDRATION: Status: ACTIVE | Noted: 2017-11-13

## 2017-11-13 RX ORDER — SODIUM CHLORIDE 9 MG/ML
INJECTION, SOLUTION INTRAVENOUS ONCE
Status: DISCONTINUED | OUTPATIENT
Start: 2017-11-13 | End: 2017-12-29 | Stop reason: HOSPADM

## 2017-11-13 RX ORDER — INTRAVENOUS ADMINISTRATION SET
1 MISCELLANEOUS MISCELLANEOUS ONCE
Qty: 1 EACH | Refills: 0 | Status: SHIPPED | OUTPATIENT
Start: 2017-11-13 | End: 2017-11-13

## 2017-11-13 NOTE — ASSESSMENT & PLAN NOTE
H/o CAD, no longer candidate for preventative therapies  · Palliative care consult  · Continue home-based care

## 2017-11-13 NOTE — TELEPHONE ENCOUNTER
Patient daughter called in stating that her mom's feet are swollen , and that she wanted more information on palliative care .

## 2017-11-13 NOTE — PROGRESS NOTES
"Primary Care Provider Appointment- HOME VISIT    Subjective:      Patient ID: Kim Bah is a 85 y.o. female with debilitated, bedbound status, frequent UTIs, dementia, dehydration.     Chief Complaint: Eye Drainage; Urinary Tract Infection; and Dehydration    Home visit performed to establish care with PCP. Patient found in bed watching tv. Her daughter, who is primary caretaker, was present. Her grandson and his girlfriend also live in the home.     Her daughter states that the patient is with decreased PO intake of both food and water, and she has to "force" her to eat, and with decreased urine output. Her last labs one week ago showed elevated BUN/Cr ratio, creatinine of 0.3, pos UA for UTI by in/out cath.     On exam, patient was verbal, with no complaints of pain. She spends most of the day in bed and has not gotten out of bed since 2017, when she was last admitted to the hospital for a UTI.    Her R eye has a mildly purulent discharge. Her daughter is using prednisone drops and eye lubricants on her R eye.    Her daughter wants to bring her to a relative's house for thanksgiving, but has concerns about moving her. The last time she moved her, she had a syncopal episode.     Social History     Social History    Marital status:      Spouse name: N/A    Number of children: N/A    Years of education: N/A     Occupational History    Not on file.     Social History Main Topics    Smoking status: Never Smoker    Smokeless tobacco: Never Used    Alcohol use No    Drug use: No    Sexual activity: Not Currently     Partners: Female     Birth control/ protection: Abstinence     Other Topics Concern    Not on file     Social History Narrative    Pt has been living with her daughter for approx 2 yrs.  Pt's grandson is also there (21 yrs old).         Past Surgical History:   Procedure Laterality Date     SECTION      EYE SURGERY      FRACTURE SURGERY      right femur with pin implants " 2013    HERNIA REPAIR      HYSTERECTOMY      THYROIDECTOMY         Past Medical History:   Diagnosis Date    Coronary artery disease involving native coronary artery of native heart without angina pectoris 5/20/2017    Dementia     Difficult intubation     Rheumatoid arthritis(714.0)     follows with Dr. Dayron Babb     Right Sided     Thyroid disease     thyroidectomy 20 years ago           Family History   Problem Relation Age of Onset    Cancer Maternal Aunt        Review of Systems   Constitutional: Positive for activity change, appetite change, fatigue and unexpected weight change.   Respiratory: Negative for cough and shortness of breath.    Cardiovascular: Negative for chest pain and leg swelling.   Genitourinary: Positive for decreased urine volume, difficulty urinating, dysuria and enuresis.   Musculoskeletal: Positive for gait problem. Negative for joint swelling.   Hematological: Bruises/bleeds easily.   Psychiatric/Behavioral: Negative for behavioral problems and decreased concentration.       Objective:   /63   Pulse 100   Temp 98 °F (36.7 °C)   LMP  (LMP Unknown)     Physical Exam   Constitutional:   malnourished   HENT:   Temporal wasting   Eyes: EOM are normal. Right eye exhibits discharge.   Cardiovascular:   tachycardic   Pulmonary/Chest: Effort normal.   Musculoskeletal: She exhibits no edema.   Psychiatric:   Interactive, but not fluent with words                                   Lab Results   Component Value Date    WBC 4.18 06/27/2017    HGB 11.0 (L) 06/27/2017    HCT 36 07/17/2017     06/27/2017    CHOL 200 (H) 12/29/2013    CHOL 202 (H) 12/29/2013    TRIG 65 12/29/2013    TRIG 65 12/29/2013    HDL 47 12/29/2013    HDL 46 12/29/2013    ALT 13 06/27/2017    AST 28 06/27/2017     06/28/2017    K 3.6 06/28/2017     06/28/2017    CREATININE 0.6 06/28/2017    BUN 11 06/28/2017    CO2 28 06/28/2017    TSH 0.555 06/27/2017    INR 1.2 05/20/2017     HGBA1C 5.5 12/29/2013       Current Outpatient Prescriptions on File Prior to Visit   Medication Sig Dispense Refill    acetaminophen (TYLENOL) 650 MG TbSR Take 1 tablet (650 mg total) by mouth every 8 (eight) hours as needed (for pain uncontrolled by Naproxen).  0    aspirin (ECOTRIN) 81 MG EC tablet Take 1 tablet (81 mg total) by mouth once daily.  0    cholecalciferol, vitamin D3, (VITAMIN D3) 5,000 unit Tab Take 10,000 Units by mouth once a week.      DOXYLAMINE SUCCINATE (SLEEP AID, DOXYLAMINE, ORAL) Take 1 tablet by mouth nightly as needed (for sleep).      lidocaine-prilocaine (EMLA) cream Apply topically daily as needed. For pain. 30 g 1    memantine (NAMENDA) 5 MG Tab TAKE 1 TABLET(5 MG) BY MOUTH EVERY DAY 30 tablet 0    naproxen sodium (ANAPROX) 220 MG tablet Take 220 mg by mouth daily as needed (for pain).      pantoprazole (PROTONIX) 40 MG tablet Take 1 tablet (40 mg total) by mouth once daily. 15 tablet 0    sulfamethoxazole-trimethoprim 400-80mg (BACTRIM,SEPTRA) 400-80 mg per tablet Take 1 tablet by mouth 2 (two) times daily. 14 tablet 0     No current facility-administered medications on file prior to visit.          Assessment:   85 y.o. female with multiple co-morbid illnesses here to establish care with new PCP and continue work-up of chronic issues notably debilitated, bedbound status, frequent UTIs, dementia, dehydration.     Plan:     Problem List Items Addressed This Visit        Neuro    Late onset Alzheimer's disease with behavioral disturbance (Chronic)     Bedbound status, minimally interactive  · Palliative consult  · Continue home-based care         Relevant Orders    Ambulatory consult to Palliative Care    Dementia     Bedbound status, minimally interactive  · Palliative consult         Relevant Orders    Ambulatory consult to Palliative Care    Ambulatory consult to Palliative Care       Cardiac/Vascular    Coronary artery disease involving native coronary artery of native  heart without angina pectoris     H/o CAD, no longer candidate for preventative therapies  · Palliative care consult  · Continue home-based care         Relevant Orders    Ambulatory consult to Palliative Care       Renal/    Dehydration    Relevant Medications    0.9%  NaCl infusion    Acute cystitis with hematuria     UA and U cx pos for UTI in 11/2017  · Bactrim started  · IVF  · Close monitoring            Other    Inability to ambulate due to multiple joints     Bed-bound status, minimally interactive  · Palliative care consult         Relevant Orders    Ambulatory consult to Palliative Care          Health Maintenance       Date Due Completion Date    TETANUS VACCINE 02/08/1950 ---    DEXA SCAN 02/08/1972 ---    Zoster Vaccine 02/08/1992 ---    Influenza Vaccine 08/01/2017 ---    Pneumococcal (65+) (2 of 2 - PPSV23) 09/01/2017 9/1/2016    Lipid Panel 12/29/2018 12/29/2013          Return in about 4 weeks (around 12/9/2017). One hour spent with this patient today, half of that in counseling.    Dolly Blackwell MD/MPH  Internal Medicine  [facility]  [phone]

## 2017-11-15 ENCOUNTER — TELEPHONE (OUTPATIENT)
Dept: INTERNAL MEDICINE | Facility: CLINIC | Age: 82
End: 2017-11-15

## 2017-11-15 PROBLEM — N30.01 ACUTE CYSTITIS WITH HEMATURIA: Status: ACTIVE | Noted: 2017-11-15

## 2017-11-15 NOTE — TELEPHONE ENCOUNTER
----- Message from Yann Holm sent at 11/15/2017 11:00 AM CST -----  Contact: Zuri Noonan at [phone]  Skilled nurse is unable to get into pt's home today to collect urine for a urinalysis.

## 2017-11-20 ENCOUNTER — TELEPHONE (OUTPATIENT)
Dept: INTERNAL MEDICINE | Facility: CLINIC | Age: 82
End: 2017-11-20

## 2017-11-20 NOTE — TELEPHONE ENCOUNTER
----- Message from Mariya Lilly sent at 11/20/2017 11:43 AM CST -----  [facility] [phone]   Doris is calling to see if the doctor will continue on the AVF therapy. Please call and advise     Thank you

## 2017-11-21 ENCOUNTER — TELEPHONE (OUTPATIENT)
Dept: INTERNAL MEDICINE | Facility: CLINIC | Age: 82
End: 2017-11-21

## 2017-11-21 DIAGNOSIS — F02.818 LATE ONSET ALZHEIMER'S DISEASE WITH BEHAVIORAL DISTURBANCE: Primary | Chronic | ICD-10-CM

## 2017-11-21 DIAGNOSIS — M06.9 RHEUMATOID ARTHRITIS INVOLVING BOTH KNEES, UNSPECIFIED RHEUMATOID FACTOR PRESENCE: ICD-10-CM

## 2017-11-21 DIAGNOSIS — E46 HYPOALBUMINEMIA DUE TO PROTEIN-CALORIE MALNUTRITION: ICD-10-CM

## 2017-11-21 DIAGNOSIS — G30.9 ALZHEIMER'S DEMENTIA WITHOUT BEHAVIORAL DISTURBANCE, UNSPECIFIED TIMING OF DEMENTIA ONSET: ICD-10-CM

## 2017-11-21 DIAGNOSIS — E88.09 HYPOALBUMINEMIA DUE TO PROTEIN-CALORIE MALNUTRITION: ICD-10-CM

## 2017-11-21 DIAGNOSIS — G30.1 LATE ONSET ALZHEIMER'S DISEASE WITH BEHAVIORAL DISTURBANCE: Primary | Chronic | ICD-10-CM

## 2017-11-21 DIAGNOSIS — R26.2 INABILITY TO AMBULATE DUE TO MULTIPLE JOINTS: ICD-10-CM

## 2017-11-21 DIAGNOSIS — F02.80 ALZHEIMER'S DEMENTIA WITHOUT BEHAVIORAL DISTURBANCE, UNSPECIFIED TIMING OF DEMENTIA ONSET: ICD-10-CM

## 2017-11-21 DIAGNOSIS — E44.1 MILD MALNUTRITION: ICD-10-CM

## 2017-11-21 NOTE — TELEPHONE ENCOUNTER
Zuri has been informed that patient is clinically improved and we do not have to treat again .  Also I have verified that patient last Pt eval was on 05/24/2017 due to home health being unable to get into the home and grandson was not answering the phone and that if we would like to add on Pt new orders must be placed.

## 2017-11-21 NOTE — TELEPHONE ENCOUNTER
We do not need to treat urinary infection again.    Has she started PT? Does she still plan to travel with her mom for thanksgiving?    Thanks,  KJ

## 2017-11-21 NOTE — TELEPHONE ENCOUNTER
Patient daughter has been informed that UTI does not to be re treated .    Patient daughter has informed me that her mother has not started PT , and that she is playing it by ear on if they will travel for thanksgiving .    *I will be reaching out to home health to find out about this delay in service it is un acceptable .

## 2017-11-21 NOTE — TELEPHONE ENCOUNTER
----- Message from Dolly Blackwell MD sent at 11/21/2017  3:10 PM CST -----  [phone]  Please see if patient is symptomatic (in pain when urinating, not talking, seems dehydrated) or clinically improved (eating, talking, urinating, more interactive).    If she's clinically improved, we don't treat again.    Thanks,  KJ  ----- Message -----  From: Hafsa Gaines MA  Sent: 11/21/2017   2:38 PM  To: Dolly Blackwell MD    Will scan into system for review.  ----- Message -----  From: Tiffany Yeung  Sent: 11/21/2017  11:06 AM  To: R[facility] Staff    ,CNS was faxed to the office and it still looks positive.    Please call and advise.    Thank You

## 2017-11-21 NOTE — TELEPHONE ENCOUNTER
Can you ask Resika when the fluids were given and how much?    Also ask the daughter how the patient is doing, and what her urine output has been?    Thanks,  KJ

## 2017-11-21 NOTE — TELEPHONE ENCOUNTER
Spoke with patient daughter and she informed me that the fluids were administered to her mother on 11/17/2017 in the evening , patient daughter stated that her mother completed the whole bottle of fluids that night .  Patient daughter stated that on Saturday her mother was feeling a whole lot better and on Sunday she slept the whole day , patient is eating and drinking normally , patient daughter stated that her mothers urine output is better .

## 2017-11-22 ENCOUNTER — TELEPHONE (OUTPATIENT)
Dept: INTERNAL MEDICINE | Facility: CLINIC | Age: 82
End: 2017-11-22

## 2017-11-22 NOTE — TELEPHONE ENCOUNTER
Dr. Blackwell they do not accept [facility] at this time and are working on a contract with them so that in the future they can accept [facility] patients.

## 2017-11-22 NOTE — TELEPHONE ENCOUNTER
Can you reach out to [facility] to see if they take [facility]? That may be the issue. If they don't we'll have to use another company.    Also, we can't do [facility] because they don't take [facility]. So we may need to switch both palliative and home health coverage.    Thanks,  DAVIE

## 2017-11-22 NOTE — TELEPHONE ENCOUNTER
Patient was seen in the home by me on 11/15/17. Which patient eval are you referring to in 5/2017?     A new order set for [facility] was placed today. Please call her daughter to let her know that in order to do the palliative program we need to do [facility]. We also feel more comfortable with [facility] for her medical management, and will be able to get her IV fluids and updates easier with this group.    Thanks,  KJ

## 2017-11-22 NOTE — TELEPHONE ENCOUNTER
Patient daughter has been informed of orders being placed , and she informed me that she is more than willing to change home health providers , but she spoke with Ms. Clarke  From palliative care and she stated that she needed to speak with Dr. Blackwell about switching home health company's and insurance  .    * Patient has been informed that as so as we get this information we will get the ball rolling for her mothers home health .

## 2017-11-30 ENCOUNTER — TELEPHONE (OUTPATIENT)
Dept: INTERNAL MEDICINE | Facility: CLINIC | Age: 82
End: 2017-11-30

## 2017-11-30 NOTE — TELEPHONE ENCOUNTER
Patient daughter stated that her mother is showing signs of what she had previously went into the hospital with , patient daughter stated that her mother is eating but not drinking enough  actually refusing to drink , patient is not acting strange .  Loose bowels started Sunday night , no fever nor vomiting .    Patient daughter has been informed that palliative care has been informed to reach out to [facility] to complete U/A & U/A Culture.    Patient daughter has been informed that upon receiving results that Dr. Blackwell will reach out to her with info

## 2017-11-30 NOTE — TELEPHONE ENCOUNTER
Spoke with Sherrie and she informed me that patient daughter has been calling  about bloody urine and patient having diarrhea .    Palliative has been informed to preform U/A and U/A Culture .

## 2017-12-01 NOTE — TELEPHONE ENCOUNTER
Patient daughter called in and stated that she would like for her mother's home health company to [employer] .

## 2017-12-04 ENCOUNTER — TELEPHONE (OUTPATIENT)
Dept: INTERNAL MEDICINE | Facility: CLINIC | Age: 82
End: 2017-12-04

## 2017-12-04 DIAGNOSIS — N30.01 ACUTE CYSTITIS WITH HEMATURIA: Primary | ICD-10-CM

## 2017-12-04 NOTE — TELEPHONE ENCOUNTER
Order for UA and urine culture placed, please fax to [facility] at [phone].    Thanks,  KJ

## 2017-12-04 NOTE — TELEPHONE ENCOUNTER
Please place orders for UA and Culture, will fax orders to [facility] fax number is [phone]

## 2017-12-05 ENCOUNTER — TELEPHONE (OUTPATIENT)
Dept: INTERNAL MEDICINE | Facility: CLINIC | Age: 82
End: 2017-12-05

## 2017-12-05 DIAGNOSIS — E88.09 HYPOALBUMINEMIA DUE TO PROTEIN-CALORIE MALNUTRITION: ICD-10-CM

## 2017-12-05 DIAGNOSIS — F02.80 ALZHEIMER'S DEMENTIA WITHOUT BEHAVIORAL DISTURBANCE, UNSPECIFIED TIMING OF DEMENTIA ONSET: ICD-10-CM

## 2017-12-05 DIAGNOSIS — E46 HYPOALBUMINEMIA DUE TO PROTEIN-CALORIE MALNUTRITION: ICD-10-CM

## 2017-12-05 DIAGNOSIS — R26.2 INABILITY TO AMBULATE DUE TO MULTIPLE JOINTS: ICD-10-CM

## 2017-12-05 DIAGNOSIS — G30.1 LATE ONSET ALZHEIMER'S DISEASE WITH BEHAVIORAL DISTURBANCE: Primary | Chronic | ICD-10-CM

## 2017-12-05 DIAGNOSIS — F02.818 LATE ONSET ALZHEIMER'S DISEASE WITH BEHAVIORAL DISTURBANCE: Primary | Chronic | ICD-10-CM

## 2017-12-05 DIAGNOSIS — G30.9 ALZHEIMER'S DEMENTIA WITHOUT BEHAVIORAL DISTURBANCE, UNSPECIFIED TIMING OF DEMENTIA ONSET: ICD-10-CM

## 2017-12-05 NOTE — TELEPHONE ENCOUNTER
----- Message from Heaven Golden sent at 12/4/2017  2:26 PM CST -----  Contact: Sherrie Clarke [facility] [phone] cell  Sherrie Marshall from [facility] need to speak with someone regarding above patient please call her back and advise at [phone].    thanks

## 2017-12-06 NOTE — TELEPHONE ENCOUNTER
----- Message from Tiffany Yeung sent at 12/6/2017  9:27 AM CST -----  Contact: Daughter/Alicia [phone]  Requesting to speak to you concerning her mother.    Please call and advise.    Thank You

## 2017-12-06 NOTE — TELEPHONE ENCOUNTER
Patient daughter called in stating that she is concerned about her mother , she has 4 bed sores and home health only comes out 1 day a week , Patient daughter is concerned about wounds getting infected and that the cream that was prescribed for her mother is not helping. Patient daughter stated that her mom need PT and this maybe the reason for the bed sores, patient daughter informed me that home health does not communicate with her about her mothers care and that her son is in the home but he does not pay attention when nurses are in the home.  Patient daughter stated that her mother needs over all around the clock care and that if her mom needs to be placed on hospice then now would be the time to do this .

## 2017-12-06 NOTE — TELEPHONE ENCOUNTER
PCP called patient's daughter, Alicia, regarding her needs for more support in the home and concerns for her mother's deteriorating health. Patient is bedbound and developing pressure sores with concern for infection.    Alicia is requesting hospice support, can not afford sitter at this time. She wants more care in the home and has insight into the concept of hospice.    PCP agreed to perform home visit on Wed 12/13 at 8am to assess wounds.     Referral to hospice with [facility]. Sherrie Clarke ph [phone], fax [phone]) called by PCP to inform.    Hafsa- please fax referral to Sherrie Clarke. Also please schedule patient for Wed 12/13 at 8am for home visit.      Thanks,  KJ

## 2017-12-06 NOTE — TELEPHONE ENCOUNTER
Called and spoke with Larisa and she informed me that Sherrie was out of the office today , I have informed her that I am faxing over Hospice orders for patient .  Orders have been faxed over .

## 2017-12-08 ENCOUNTER — TELEPHONE (OUTPATIENT)
Dept: INTERNAL MEDICINE | Facility: CLINIC | Age: 82
End: 2017-12-08

## 2017-12-08 DIAGNOSIS — N30.01 ACUTE CYSTITIS WITH HEMATURIA: Primary | ICD-10-CM

## 2017-12-08 RX ORDER — SULFAMETHOXAZOLE AND TRIMETHOPRIM 800; 160 MG/1; MG/1
1 TABLET ORAL 2 TIMES DAILY
Qty: 14 TABLET | Refills: 0 | Status: ON HOLD | OUTPATIENT
Start: 2017-12-08 | End: 2017-12-29 | Stop reason: HOSPADM

## 2017-12-08 NOTE — TELEPHONE ENCOUNTER
Urine culture results from home health indicate UTI. Patient's daughter also concerned about infected ulcers on patient's sacrum. PCP to start bactrim 800 twice daily for one week.    Patient's gallitougherAlicia, requesting a home visit to evaluate patient's arm for swelling. Home visit scheduled today.    Sheree- please put patient on schedule for today and check her in.    Thanks,  DAVIE

## 2017-12-27 ENCOUNTER — HOSPITAL ENCOUNTER (INPATIENT)
Facility: HOSPITAL | Age: 82
LOS: 3 days | Discharge: HOME HEALTH | DRG: 871 | End: 2017-12-30
Attending: EMERGENCY MEDICINE | Admitting: EMERGENCY MEDICINE
Payer: MEDICARE

## 2017-12-27 DIAGNOSIS — N39.0 URINARY TRACT INFECTION WITH HEMATURIA, SITE UNSPECIFIED: ICD-10-CM

## 2017-12-27 DIAGNOSIS — R31.9 URINARY TRACT INFECTION WITH HEMATURIA, SITE UNSPECIFIED: ICD-10-CM

## 2017-12-27 DIAGNOSIS — D69.6 THROMBOCYTOPENIA: ICD-10-CM

## 2017-12-27 DIAGNOSIS — R00.0 TACHYCARDIA: ICD-10-CM

## 2017-12-27 DIAGNOSIS — E87.6 HYPOKALEMIA: ICD-10-CM

## 2017-12-27 DIAGNOSIS — J18.9 PNEUMONIA OF LOWER LOBE DUE TO INFECTIOUS ORGANISM, UNSPECIFIED LATERALITY: Primary | ICD-10-CM

## 2017-12-27 LAB
ALBUMIN SERPL BCP-MCNC: 1.1 G/DL
ALP SERPL-CCNC: 116 U/L
ALT SERPL W/O P-5'-P-CCNC: 32 U/L
ANION GAP SERPL CALC-SCNC: 6 MMOL/L
AST SERPL-CCNC: 37 U/L
BACTERIA #/AREA URNS AUTO: ABNORMAL /HPF
BASOPHILS # BLD AUTO: 0.01 K/UL
BASOPHILS NFR BLD: 0.2 %
BILIRUB SERPL-MCNC: 0.5 MG/DL
BILIRUB UR QL STRIP: NEGATIVE
BNP SERPL-MCNC: 214 PG/ML
BUN SERPL-MCNC: 14 MG/DL
CALCIUM SERPL-MCNC: 7.4 MG/DL
CHLORIDE SERPL-SCNC: 101 MMOL/L
CLARITY UR REFRACT.AUTO: ABNORMAL
CO2 SERPL-SCNC: 26 MMOL/L
COLOR UR AUTO: YELLOW
CREAT SERPL-MCNC: 0.6 MG/DL
DIFFERENTIAL METHOD: ABNORMAL
EOSINOPHIL # BLD AUTO: 0 K/UL
EOSINOPHIL NFR BLD: 0 %
ERYTHROCYTE [DISTWIDTH] IN BLOOD BY AUTOMATED COUNT: 16.3 %
EST. GFR  (AFRICAN AMERICAN): >60 ML/MIN/1.73 M^2
EST. GFR  (NON AFRICAN AMERICAN): >60 ML/MIN/1.73 M^2
GLUCOSE SERPL-MCNC: 91 MG/DL
GLUCOSE UR QL STRIP: NEGATIVE
HCT VFR BLD AUTO: 26.7 %
HGB BLD-MCNC: 9.2 G/DL
HGB UR QL STRIP: ABNORMAL
HYALINE CASTS UR QL AUTO: 0 /LPF
IMM GRANULOCYTES # BLD AUTO: 0.02 K/UL
IMM GRANULOCYTES NFR BLD AUTO: 0.3 %
KETONES UR QL STRIP: NEGATIVE
LACTATE SERPL-SCNC: 2.3 MMOL/L
LEUKOCYTE ESTERASE UR QL STRIP: ABNORMAL
LYMPHOCYTES # BLD AUTO: 1.9 K/UL
LYMPHOCYTES NFR BLD: 28.5 %
MCH RBC QN AUTO: 29.3 PG
MCHC RBC AUTO-ENTMCNC: 34.5 G/DL
MCV RBC AUTO: 85 FL
MICROSCOPIC COMMENT: ABNORMAL
MONOCYTES # BLD AUTO: 0.5 K/UL
MONOCYTES NFR BLD: 7.1 %
NEUTROPHILS # BLD AUTO: 4.1 K/UL
NEUTROPHILS NFR BLD: 63.9 %
NITRITE UR QL STRIP: POSITIVE
NRBC BLD-RTO: 0 /100 WBC
PH UR STRIP: 6 [PH] (ref 5–8)
PLATELET # BLD AUTO: 71 K/UL
PMV BLD AUTO: 9.4 FL
POTASSIUM SERPL-SCNC: 3.1 MMOL/L
PROT SERPL-MCNC: 5.2 G/DL
PROT UR QL STRIP: ABNORMAL
RBC # BLD AUTO: 3.14 M/UL
RBC #/AREA URNS AUTO: 44 /HPF (ref 0–4)
SODIUM SERPL-SCNC: 133 MMOL/L
SP GR UR STRIP: 1.01 (ref 1–1.03)
TROPONIN I SERPL DL<=0.01 NG/ML-MCNC: 0.02 NG/ML
URN SPEC COLLECT METH UR: ABNORMAL
UROBILINOGEN UR STRIP-ACNC: NEGATIVE EU/DL
WBC # BLD AUTO: 6.48 K/UL
WBC #/AREA URNS AUTO: >100 /HPF (ref 0–5)
WBC CLUMPS UR QL AUTO: ABNORMAL

## 2017-12-27 PROCEDURE — 84484 ASSAY OF TROPONIN QUANT: CPT

## 2017-12-27 PROCEDURE — 25000003 PHARM REV CODE 250: Performed by: HOSPITALIST

## 2017-12-27 PROCEDURE — 96374 THER/PROPH/DIAG INJ IV PUSH: CPT

## 2017-12-27 PROCEDURE — 83605 ASSAY OF LACTIC ACID: CPT

## 2017-12-27 PROCEDURE — 87086 URINE CULTURE/COLONY COUNT: CPT

## 2017-12-27 PROCEDURE — 87088 URINE BACTERIA CULTURE: CPT

## 2017-12-27 PROCEDURE — 99285 EMERGENCY DEPT VISIT HI MDM: CPT | Mod: 25

## 2017-12-27 PROCEDURE — 25000003 PHARM REV CODE 250: Performed by: PHYSICIAN ASSISTANT

## 2017-12-27 PROCEDURE — 96361 HYDRATE IV INFUSION ADD-ON: CPT

## 2017-12-27 PROCEDURE — 93010 ELECTROCARDIOGRAM REPORT: CPT | Mod: ,,, | Performed by: INTERNAL MEDICINE

## 2017-12-27 PROCEDURE — 25000003 PHARM REV CODE 250: Performed by: EMERGENCY MEDICINE

## 2017-12-27 PROCEDURE — 83880 ASSAY OF NATRIURETIC PEPTIDE: CPT

## 2017-12-27 PROCEDURE — 11000001 HC ACUTE MED/SURG PRIVATE ROOM

## 2017-12-27 PROCEDURE — 87186 SC STD MICRODIL/AGAR DIL: CPT

## 2017-12-27 PROCEDURE — 80053 COMPREHEN METABOLIC PANEL: CPT

## 2017-12-27 PROCEDURE — 81001 URINALYSIS AUTO W/SCOPE: CPT

## 2017-12-27 PROCEDURE — 63600175 PHARM REV CODE 636 W HCPCS: Performed by: PHYSICIAN ASSISTANT

## 2017-12-27 PROCEDURE — 85025 COMPLETE CBC W/AUTO DIFF WBC: CPT

## 2017-12-27 PROCEDURE — 99223 1ST HOSP IP/OBS HIGH 75: CPT | Mod: ,,, | Performed by: HOSPITALIST

## 2017-12-27 PROCEDURE — 87077 CULTURE AEROBIC IDENTIFY: CPT

## 2017-12-27 PROCEDURE — 99285 EMERGENCY DEPT VISIT HI MDM: CPT | Mod: ,,, | Performed by: EMERGENCY MEDICINE

## 2017-12-27 RX ORDER — ASPIRIN 81 MG/1
81 TABLET ORAL DAILY
Status: DISCONTINUED | OUTPATIENT
Start: 2017-12-28 | End: 2017-12-28

## 2017-12-27 RX ORDER — PANTOPRAZOLE SODIUM 40 MG/1
40 TABLET, DELAYED RELEASE ORAL DAILY
Status: DISCONTINUED | OUTPATIENT
Start: 2017-12-28 | End: 2017-12-30 | Stop reason: HOSPADM

## 2017-12-27 RX ORDER — CEFTRIAXONE 1 G/1
1 INJECTION, POWDER, FOR SOLUTION INTRAMUSCULAR; INTRAVENOUS
Status: COMPLETED | OUTPATIENT
Start: 2017-12-27 | End: 2017-12-27

## 2017-12-27 RX ORDER — NAPROXEN 250 MG/1
250 TABLET ORAL 2 TIMES DAILY PRN
Status: DISCONTINUED | OUTPATIENT
Start: 2017-12-27 | End: 2017-12-28

## 2017-12-27 RX ORDER — MEMANTINE HYDROCHLORIDE 5 MG/1
5 TABLET ORAL DAILY
Status: DISCONTINUED | OUTPATIENT
Start: 2017-12-28 | End: 2017-12-30 | Stop reason: HOSPADM

## 2017-12-27 RX ORDER — ACETAMINOPHEN 500 MG
10000 TABLET ORAL WEEKLY
Status: DISCONTINUED | OUTPATIENT
Start: 2018-01-02 | End: 2017-12-29

## 2017-12-27 RX ORDER — ACETAMINOPHEN 325 MG/1
650 TABLET ORAL EVERY 6 HOURS PRN
Status: DISCONTINUED | OUTPATIENT
Start: 2017-12-27 | End: 2017-12-30 | Stop reason: HOSPADM

## 2017-12-27 RX ORDER — CEFTRIAXONE 1 G/50ML
1 INJECTION, SOLUTION INTRAVENOUS
Status: DISCONTINUED | OUTPATIENT
Start: 2017-12-28 | End: 2017-12-30 | Stop reason: HOSPADM

## 2017-12-27 RX ADMIN — POTASSIUM BICARBONATE 50 MEQ: 25 TABLET, EFFERVESCENT ORAL at 12:12

## 2017-12-27 RX ADMIN — SODIUM CHLORIDE 500 ML: 900 INJECTION, SOLUTION INTRAVENOUS at 10:12

## 2017-12-27 RX ADMIN — NAPROXEN 250 MG: 250 TABLET ORAL at 09:12

## 2017-12-27 RX ADMIN — SODIUM CHLORIDE 250 ML: 0.9 INJECTION, SOLUTION INTRAVENOUS at 04:12

## 2017-12-27 RX ADMIN — CEFTRIAXONE SODIUM 1 G: 1 INJECTION, POWDER, FOR SOLUTION INTRAMUSCULAR; INTRAVENOUS at 12:12

## 2017-12-27 RX ADMIN — SODIUM CHLORIDE 250 ML: 0.9 INJECTION, SOLUTION INTRAVENOUS at 02:12

## 2017-12-27 NOTE — ED NOTES
I acknowledge care of this pt. The patient is awake, alert, and cooperative with a calm affect. Patient is aware of environment, airway is open and patent, respirations are spontaneous; normal effort and rate noted, skin warm and dry, moves all extremities well. No apparent distress noted, bed placed in low position, side rails up x2, call light is within reach of patient. Pt aware of POC. Family at bedside. Offers no complaints at this time.

## 2017-12-27 NOTE — ED PROVIDER NOTES
Encounter Date: 2017       History     Chief Complaint   Patient presents with    Edema     85-year-old female with history of CAD, dementia, RA, thyroid disease presents via for evaluation of edema.  Per daughter, who is patient's primary caregiver, patient has had progressively worsening edema to the upper extremities and upper thighs over the past week.  Daughter also notes that the patient had a pressure ulcer about one week ago.  Home health recently changed the dressings which daughter feels have worsened the patient's wounds.  She also notes new scattered surrounding ulcers.  She denies any changes in mental status, vomiting or diarrhea, black or bloody stool, fever.  The patient has never complained of any pain.          Review of patient's allergies indicates:   Allergen Reactions    Tramadol Other (See Comments)     Hallucinations; psychosis     Past Medical History:   Diagnosis Date    Arthritis     Coronary artery disease involving native coronary artery of native heart without angina pectoris 2017    Dementia     Difficult intubation     Rheumatoid arthritis(714.0)     follows with Dr. Dayron Babb     Right Sided     Thyroid disease     thyroidectomy 20 years ago     Past Surgical History:   Procedure Laterality Date     SECTION      EYE SURGERY      FRACTURE SURGERY      right femur with pin implants 2013    HERNIA REPAIR      HYSTERECTOMY      THYROIDECTOMY       Family History   Problem Relation Age of Onset    Cancer Maternal Aunt      Social History   Substance Use Topics    Smoking status: Never Smoker    Smokeless tobacco: Never Used    Alcohol use No     Review of Systems   Unable to perform ROS: Dementia       Physical Exam     Initial Vitals [17 0936]   BP Pulse Resp Temp SpO2   (!) 84/56 100 16 98.2 °F (36.8 °C) (!) 90 %      MAP       65.33         Physical Exam    Nursing note and vitals reviewed.  Constitutional: She is not diaphoretic.   Non-toxic appearance. She does not appear ill. No distress.   HENT:   Head: Normocephalic and atraumatic.   Mouth/Throat: Mucous membranes are normal.   Neck: Neck supple.   Cardiovascular: Regular rhythm. Tachycardia present.  Exam reveals no gallop and no friction rub.    No murmur heard.  2+ edema noted to bilateral hands up to mid upper arm. 2+ edema to the upper legs. 1+ edema extending to the lower legs. DP pulses intact bilaterally.    Pulmonary/Chest: Effort normal and breath sounds normal. No accessory muscle usage. No tachypnea. No respiratory distress. She has no decreased breath sounds. She has no wheezes. She has no rhonchi. She has no rales.   Abdominal: Normal appearance. She exhibits no distension. There is tenderness in the epigastric area.   Musculoskeletal: Normal range of motion.   Neurological: She is alert and oriented to person, place, and time.   Skin: Skin is warm and dry. No pallor.   Sacral decubitus ulcers noted. No significant surrounding erythema. No purulent drainage.    Psychiatric: She has a normal mood and affect. Her behavior is normal. Judgment and thought content normal.         ED Course   Procedures  Labs Reviewed   CBC W/ AUTO DIFFERENTIAL - Abnormal; Notable for the following:        Result Value    RBC 3.14 (*)     Hemoglobin 9.2 (*)     Hematocrit 26.7 (*)     RDW 16.3 (*)     Platelets 71 (*)     All other components within normal limits   COMPREHENSIVE METABOLIC PANEL - Abnormal; Notable for the following:     Sodium 133 (*)     Potassium 3.1 (*)     Calcium 7.4 (*)     Total Protein 5.2 (*)     Albumin 1.1 (*)     Anion Gap 6 (*)     All other components within normal limits   B-TYPE NATRIURETIC PEPTIDE - Abnormal; Notable for the following:      (*)     All other components within normal limits   LACTIC ACID, PLASMA - Abnormal; Notable for the following:     Lactate (Lactic Acid) 2.3 (*)     All other components within normal limits   URINALYSIS, REFLEX TO URINE  CULTURE - Abnormal; Notable for the following:     Appearance, UA Cloudy (*)     Protein, UA 2+ (*)     Occult Blood UA 2+ (*)     Nitrite, UA Positive (*)     Leukocytes, UA 3+ (*)     All other components within normal limits    Narrative:     Straight cath   URINALYSIS MICROSCOPIC - Abnormal; Notable for the following:     RBC, UA 44 (*)     WBC, UA >100 (*)     WBC Clumps, UA Many (*)     Bacteria, UA Many (*)     All other components within normal limits    Narrative:     Straight cath   CULTURE, URINE   TROPONIN I             Medical Decision Making:   History:   Old Medical Records: I decided to obtain old medical records.  Differential Diagnosis:   My differential diagnosis includes but is not limited to: Renal failure, CHF, electrolyte derangement, dehydration, sepsis  Clinical Tests:   Lab Tests: Ordered and Reviewed  Radiological Study: Ordered and Reviewed  Medical Tests: Ordered and Reviewed       APC / Resident Notes:   85-year-old female presents to the ED with worsening edema to the upper and lower extremities for the past week.  She is tachycardic and hypotensive on exam.  Per daughter, patient is mentating at her baseline.  She does not appear in acute distress.  She is nontoxic.  Afebrile.  Sacral pressure ulcers do not appear infected.  There is edema noted to the hands and lower arms as well as to the upper legs.    Bedside ultrasound performed by my attending, Dr. Rivero, revealed no evidence of pericardial effusion or IVC.     Workup is remarkable for nitrite positive UTI.  Elevated lactate at 2.3.  Hypokalemia at 3.1.  Mild hyponatremia at 133.  Hypoalbuminemia at 1.1.  Mild elevation of .  Troponin within normal limits.  Chest x-ray remarkable for bilateral pleural effusions with developing consolidation concerning for pneumonia.    Patient's vitals improved after 500 cc fluid bolus.  Patient continued to mentate at her baseline.  Patient was given 1 dose of IV Rocephin in the ED.   She will be admitted for further management of pneumonia, UTI, and edema. I have reviewed the patient's records and discussed this case with my supervising physician.                  ED Course      Clinical Impression:   The primary encounter diagnosis was Pneumonia of lower lobe due to infectious organism, unspecified laterality. Diagnoses of Tachycardia, Urinary tract infection with hematuria, site unspecified, Thrombocytopenia, and Hypokalemia were also pertinent to this visit.    Disposition:   Disposition: Admitted  Condition: Tomy Ken PA-C  12/27/17 3044

## 2017-12-27 NOTE — ED NOTES
Patient resting in stretcher and is in NAD at this time. Pt is awake and alert, answering questing appropriately at this time, oriented to self place and situation, disoriented to time. VSS, respirations even and unlabored. Family at bedside. Pt and family aware of POC. Bed low and locked with side rails up x2, call bell in pt reach.

## 2017-12-27 NOTE — ED NOTES
RIOS ACEVEDO at bedside and aware of pt BP 65/43 - Dr SAMARA Rivero informed of pt BP - verbal order with readback received to start 500cc NS bolus.

## 2017-12-27 NOTE — ED NOTES
Patient identifiers verified and correct for Kim Bah.    LOC: The patient is awake, alert and oriented x 4. Pt is speaking appropriately, no slurred speech.  APPEARANCE: Patient resting and in no acute distress. Pt is clean and well groomed. No JVD visible. Pt reports pain level of 0.  SKIN: Skin is warm dry and intact, and color is consistent with ethnicity. No tenting observed and capillary refill <3 seconds. No clubbing noted to nail beds. No breakdown or brusing visible and mucus membranes moist and acyanotic.  MUSCULOSKELETAL: Full range of motion present in all extremities. Hand  equal and leg strength strong +2 bilaterally.  RESPIRATORY: Airway is open and patent. Respirations-unlabored, regular rate, equal bilaterally on inspiration and expiration. No accessory muscle use noted. Lungs clear to auscultation in all fields bilaterally anterior and posterior.   CARDIAC: Patient has regular heart rate and rhythm.  No peripheral edema noted, and patient has no c/o chest pain.  ABDOMEN: Soft and non-tender to palpation with no distention noted. Normoactive bowel sounds X4 quadrants. Pt has no complaints of abnormal bowel movements. Pt reports normal appetite.   NEUROLOGIC: Eyes open spontaneously and facial expression symmetrical. Pt follows some commands, does not answer questions. PERRLA with pupils 2mm bilaterally  : Pt with clean diaper on on arrival, family reports pt with urinary incontinence.

## 2017-12-27 NOTE — ED TRIAGE NOTES
"Patient presents by  EMS with daughter. Patients daughter states that she was being treated for a pressure sore which has worsened since being see by home health care. Patients daughter states that she appears to be getting "fluid and blisters" everywhere. No other complaints at this time.  "

## 2017-12-27 NOTE — ED NOTES
Care assumed. Patient resting in stretcher and is in NAD at this time. Pt is awake and alert, answering questing appropriately at this time, oriented to self place and situation, disoriented to time. Pt hypotensive 80/53, Hosp Med A MD paged and informed, states to administer 250cc bolus of NS. Family at bedside. Pt and family updated on POC. Bed low and locked with side rails up x2, call bell in pt reach.    APPEARANCE: Patient resting and in no acute distress. Pt is clean and well groomed. No JVD visible.  RESPIRATORY: Airway is open and patent. Respirations-unlabored, regular rate, equal bilaterally on inspiration and expiration. No accessory muscle use noted. Lungs clear to auscultation in all fields bilaterally anterior and posterior.   CARDIAC: Patient has regular heart rate and rhythm. Generalized edema noted.  ABDOMEN: Soft and non-tender to palpation with no distention noted. Normoactive bowel sounds X4 quadrants. Pt has no complaints of abnormal bowel movements. Pt reports normal appetite.   NEUROLOGIC: Eyes open spontaneously and facial expression symmetrical. Pt follows commands, answering questions appropriately. PERRLA with pupils 2mm bilaterally.

## 2017-12-27 NOTE — ED NOTES
Bed: [facility]  Expected date:   Expected time:   Means of arrival:   Comments:

## 2017-12-28 PROBLEM — L89.95 UNSTAGEABLE PRESSURE INJURY OF SKIN AND TISSUE: Status: ACTIVE | Noted: 2017-12-28

## 2017-12-28 LAB
ANION GAP SERPL CALC-SCNC: 6 MMOL/L
BASOPHILS # BLD AUTO: 0.01 K/UL
BASOPHILS NFR BLD: 0.2 %
BUN SERPL-MCNC: 15 MG/DL
CALCIUM SERPL-MCNC: 7.2 MG/DL
CHLORIDE SERPL-SCNC: 102 MMOL/L
CO2 SERPL-SCNC: 27 MMOL/L
CREAT SERPL-MCNC: 0.6 MG/DL
CRP SERPL-MCNC: 42.33 MG/L
DIFFERENTIAL METHOD: ABNORMAL
EOSINOPHIL # BLD AUTO: 0 K/UL
EOSINOPHIL NFR BLD: 0.2 %
ERYTHROCYTE [DISTWIDTH] IN BLOOD BY AUTOMATED COUNT: 16.3 %
ERYTHROCYTE [SEDIMENTATION RATE] IN BLOOD BY WESTERGREN METHOD: 8 MM/HR
EST. GFR  (AFRICAN AMERICAN): >60 ML/MIN/1.73 M^2
EST. GFR  (NON AFRICAN AMERICAN): >60 ML/MIN/1.73 M^2
GLUCOSE SERPL-MCNC: 59 MG/DL
HCT VFR BLD AUTO: 24.8 %
HGB BLD-MCNC: 8.6 G/DL
IMM GRANULOCYTES # BLD AUTO: 0.03 K/UL
IMM GRANULOCYTES NFR BLD AUTO: 0.5 %
LACTATE SERPL-SCNC: 1.6 MMOL/L
LYMPHOCYTES # BLD AUTO: 1.9 K/UL
LYMPHOCYTES NFR BLD: 31.4 %
MCH RBC QN AUTO: 29.4 PG
MCHC RBC AUTO-ENTMCNC: 34.7 G/DL
MCV RBC AUTO: 85 FL
MONOCYTES # BLD AUTO: 0.3 K/UL
MONOCYTES NFR BLD: 5.6 %
NEUTROPHILS # BLD AUTO: 3.7 K/UL
NEUTROPHILS NFR BLD: 62.1 %
NRBC BLD-RTO: 0 /100 WBC
PLATELET # BLD AUTO: 180 K/UL
PMV BLD AUTO: 10.7 FL
POTASSIUM SERPL-SCNC: 4.1 MMOL/L
PROCALCITONIN SERPL IA-MCNC: 0.13 NG/ML
RBC # BLD AUTO: 2.93 M/UL
SODIUM SERPL-SCNC: 135 MMOL/L
WBC # BLD AUTO: 5.93 K/UL

## 2017-12-28 PROCEDURE — 83605 ASSAY OF LACTIC ACID: CPT

## 2017-12-28 PROCEDURE — 99232 SBSQ HOSP IP/OBS MODERATE 35: CPT | Mod: ,,, | Performed by: HOSPITALIST

## 2017-12-28 PROCEDURE — 25000003 PHARM REV CODE 250: Performed by: HOSPITALIST

## 2017-12-28 PROCEDURE — 11000001 HC ACUTE MED/SURG PRIVATE ROOM

## 2017-12-28 PROCEDURE — 25000003 PHARM REV CODE 250: Performed by: PHYSICIAN ASSISTANT

## 2017-12-28 PROCEDURE — 36415 COLL VENOUS BLD VENIPUNCTURE: CPT

## 2017-12-28 PROCEDURE — 85025 COMPLETE CBC W/AUTO DIFF WBC: CPT

## 2017-12-28 PROCEDURE — 80048 BASIC METABOLIC PNL TOTAL CA: CPT

## 2017-12-28 PROCEDURE — 63600175 PHARM REV CODE 636 W HCPCS: Performed by: HOSPITALIST

## 2017-12-28 PROCEDURE — 84145 PROCALCITONIN (PCT): CPT

## 2017-12-28 PROCEDURE — 85651 RBC SED RATE NONAUTOMATED: CPT

## 2017-12-28 PROCEDURE — 86141 C-REACTIVE PROTEIN HS: CPT

## 2017-12-28 RX ORDER — NAPROXEN 250 MG/1
250 TABLET ORAL 2 TIMES DAILY
Status: DISCONTINUED | OUTPATIENT
Start: 2017-12-28 | End: 2017-12-30 | Stop reason: HOSPADM

## 2017-12-28 RX ADMIN — NAPROXEN 250 MG: 250 TABLET ORAL at 09:12

## 2017-12-28 RX ADMIN — SODIUM CHLORIDE 500 ML: 900 INJECTION, SOLUTION INTRAVENOUS at 09:12

## 2017-12-28 RX ADMIN — HYPROMELLOSE 2910 1 DROP: 5 SOLUTION OPHTHALMIC at 04:12

## 2017-12-28 RX ADMIN — HYPROMELLOSE 2910 1 DROP: 5 SOLUTION OPHTHALMIC at 11:12

## 2017-12-28 RX ADMIN — PANTOPRAZOLE SODIUM 40 MG: 40 TABLET, DELAYED RELEASE ORAL at 09:12

## 2017-12-28 RX ADMIN — CEFTRIAXONE SODIUM 1 G: 1 INJECTION, POWDER, FOR SOLUTION INTRAMUSCULAR; INTRAVENOUS at 02:12

## 2017-12-28 RX ADMIN — ASPIRIN 81 MG: 81 TABLET, COATED ORAL at 09:12

## 2017-12-28 RX ADMIN — NAPROXEN 250 MG: 250 TABLET ORAL at 11:12

## 2017-12-28 NOTE — PROGRESS NOTES
[facility] Hospital Medicine  Progress Note    Patient Name: Kim Bah  MRN: [id number]  Patient Class: IP- Inpatient   Admission Date: 12/27/2017  Length of Stay: 1 days  Attending Physician: Chaparro Jarvis MD  Primary Care Provider: Dolly Blackwell MD    [facility] Medicine Team: [facility]OSP MED A Chaparro Jarvis MD    Subjective:     Principal Problem:<principal problem not specified>    HPI: 85-year-old female with history of CAD, dementia, RA, thyroid disease presents for evaluation of edema.  Per daughter, who is patient's primary caregiver, patient has had progressively worsening edema to the upper extremities and upper thighs over the past week.  Daughter also notes that the patient had a pressure ulcer about one week ago.  Home health recently changed the dressings which daughter feels have worsened the patient's wounds.  She also notes new scattered surrounding ulcers.  She denies any changes in mental status, vomiting or diarrhea, black or bloody stool, fever.  The patient has never complained of any pain.     Pt w/ recent UTI about 1 wk ago and started on Bactrim. Poor oral intake but encouraged by family to eat.      Pt was under hospice care but daughter rescinded this and would like medical eval. PCP arrived at bedside to discuss and family insisted on admission and w/u.    12/28: BP stable ranging from 90s-100s/50s w/o further fluid bolus. Daughter states that pt w/ R eye discomfort and intermittent back pain from ulcers. Mental status is changed and pt w/o any other complaints.     Review of Systems  Objective:     Vital Signs (Most Recent):  Temp: 97.1 °F (36.2 °C) (12/28/17 1224)  Pulse: 104 (12/28/17 1224)  Resp: 20 (12/28/17 1224)  BP: (!) 108/59 (12/28/17 1224)  SpO2: (!) 92 % (12/28/17 0800) Vital Signs (24h Range):  Temp:  [97 °F (36.1 °C)-98 °F (36.7 °C)] 97.1 °F (36.2 °C)  Pulse:  [] 104  Resp:  [14-24] 20  SpO2:  [92 %-99 %] 92 %  BP: ()/(51-80) 108/59      Weight: 43.1 kg (95 lb 1.6 oz)  Body mass index is 18.57 kg/m².    Intake/Output Summary (Last 24 hours) at 12/28/17 1329  Last data filed at 12/27/17 2230   Gross per 24 hour   Intake              620 ml   Output                0 ml   Net              620 ml      Physical Exam   Nursing note and vitals reviewed.  Constitutional: She is not diaphoretic.  Non-toxic appearance. She does not appear ill. No distress. More alert today than yesterday but limited verbal ability, and some difficulty following commands (appears to be baseline)  HENT: PERRL, EOMI, no erythema, edema or tenderness of either eye  Head: Normocephalic and atraumatic.   Mouth/Throat: Mucous membranes are normal.   Neck: Neck supple.   Cardiovascular: Regular rhythm. Tachycardia present.  Exam reveals no gallop and no friction rub.    No murmur heard.  2+ edema noted to bilateral hands up to mid upper arm, dependent edema to loose skin. 2+ edema to the upper legs. 1+ edema extending to the lower legs. DP pulses intact bilaterally.    Pulmonary/Chest: Effort normal and breath sounds normal. No accessory muscle usage. No tachypnea. No respiratory distress. She has no decreased breath sounds. She has no wheezes. She has no rhonchi. She has no rales.   Abdominal: Normal appearance. She exhibits no distension. There is no tenderness   Musculoskeletal: Normal range of motion.   Neurological: She is alert but limited verbal ability and orientation difficult to assess  Skin: Skin is warm and dry. No pallor.   Sacral decubitus ulcers noted. No significant surrounding erythema. No purulent drainage. R foot heel ulcer. Dry skin w/ cracking  Psychiatric: She has a normal mood and affect. Her behavior is normal. Judgment and thought content normal.    Significant Labs: All pertinent labs within the past 24 hours have been reviewed.    Significant Imaging: I have reviewed and interpreted all pertinent imaging results/findings within the past 24  hours.    Assessment/Plan:      Active Diagnoses:    Diagnosis Date Noted POA    Pneumonia of lower lobe due to infectious organism [J18.1] 12/27/2017 Yes      Problems Resolved During this Admission:    Diagnosis Date Noted Date Resolved POA     VTE Risk Mitigation         Ordered     Place sequential compression device  Until discontinued      12/27/17 1945     Medium Risk of VTE  Once      12/27/17 1216     Place ANABEL hose  Until discontinued      12/27/17 1216     Place sequential compression device  Until discontinued      12/27/17 1216         86 y/o AAF w/ dementia brought in by family for edema.      Edema  Likely from acute on chronic diastolic heart failure and malnutrition. BNP elevated to 214 and TTE in May w/ G2DD and PAP 40 (pulmonary hypertension). Albumin 1.1 in chronically undernourished pt. Family does not want NGT or PEG tube at this time. Discussed goals of care with family again and informed them that most of the issues that we were attempting to address were not from any acute change and likely from progression of chronic illness in the pt. The family is not entirely clear in presenting their goals during admission. The daughter would like the edema to improve, the BP to improve and the ulcers to improve. I communicated that pt is likely intravascularly depleted and BP is low from this but has third spacing and this is most likely d/t the above causes, with nutrition probably the most important. I told the daughter that nutrition improvement will take time and may not happen at all. Without this, third spacing will worsen, skin will stretch and ulcers will get worse. We can support BP w/ IVF bolus prn but it will also eventually leak out. Again broached the subject of hospice and daughter more interested in home health. She would like to readdress everything tomorrow and hoping to stay until Saturday.  -Regular diet, encourage oral intake  -Add Boost w/ meals, nutrition consult     Pt met  criteria for severe sepsis and nearly septic shock at presentation  HR still in 100s. Suspected source would be PNA and pt given CTX in Ed. However, no WBC inc, temp, or hypoxia. LA >2 but likely from dehydration and poor oral intake (now 1.6 after fluids). More likely CXR indicates b/l pleural effusions (likely from same processes causing edema as above) seen previously but worsening and radiology read unable to r/o infection. Will continue CTX given risks/benefits and current goals of care. Procal negative, ESR negative, CRP up to 20 (but could be from sacral lesions)  -Rocephin 1g Q24h, can switch to oral abx at discharge     Sacral ulcers  -wound care consult. Applied cream to area.     Dementia  memantine     GERD  PPI     Anemia  Chronic ~11, 9.2 today, down to 8 today. Unclear cause. Last labs in 2012 w/ e/o APURVA but nl ferritin. B12 prev 290. TSH has been nl.   Daily CBC, will check, Fe panel, ferritin, B12, Fa, MMA, retic  -hold asa     Thrombocytopenia  New findings at admission, now resolved. Possible marrow suppression from malnutrition. Monitor. Could be from sepsis as above.     Eye discomfort  Artifical tears ordered     Last Vit D in 2012 was 53, will recheck    Chaparro Jarvis MD  Department of Hospital Medicine   [facility]

## 2017-12-28 NOTE — PLAN OF CARE
12:26 PM  SW called daughter to indicate if she would like a different hospice company. Daughter stated she would like to think about it. Will call back,.     Roxi Kidd LMSW   [facility]  Ext [phone]

## 2017-12-28 NOTE — PLAN OF CARE
Met w daughter ,Shweta, in the room  Shweta wants to speak w BLANCA Diane about changing from [facility]      12/28/17 1202   Discharge Assessment   Assessment Type Discharge Planning Assessment   Confirmed/corrected address and phone number on facesheet? Yes   Assessment information obtained from? Caregiver   Expected Length of Stay (days) 3   Communicated expected length of stay with patient/caregiver yes   Prior to hospitilization cognitive status: (didn't speak back to  me but looked at me and followed me w her eyes. gallitoughluz marina shweta entered room so she answered questions for me)   Prior to hospitalization functional status: Completely Dependent   Current Functional Status: Completely Dependent   Lives With child(bambi), adult   Able to Return to Prior Arrangements yes   Is patient able to care for self after discharge? No   Who are your caregiver(s) and their phone number(s)? shweta daughter is [phone]   Patient's perception of discharge disposition hospice/home   Readmission Within The Last 30 Days no previous admission in last 30 days   Patient currently being followed by outpatient case management? No   Patient currently receives any other outside agency services? No   Equipment Currently Used at Home hospital bed;wheelchair;rollator;bedside commode;shower chair;grab bar   Do you have any problems affording any of your prescribed medications? No   Is the patient taking medications as prescribed? yes   Does the patient have transportation home? No   Does the patient receive services at the [facility]? No   Discharge Plan A Hospice/home   Discharge Plan B Hospice/home   Patient/Family In Agreement With Plan yes   Hospice to another agency. MSW notified.  bedbound- needs stretcher tx  No oxygen

## 2017-12-28 NOTE — PROGRESS NOTES
Consult received on patient. unstageable pressure injury noted to patient's right buttocks and unstageable pressure injury noted to patient's right lateral heel. See flow sheet below for additional documentation.    Recommendations:  Sacral pressure injury: Triad barrier cream qshift and as needed. (Triad left at bedside)  Turn every 2hrs  Right heel pressure injury: paint daily with betadine  Ehob heel boots, boots on patient.    Discussed recommendations with Dr. Jarvis, telephone order received for nursing. Nursing to continue care.       12/28/17 1036       Pressure Ulcer 12/28/17 1036 Right buttocks Unstageable   Date First Assessed/Time First Assessed: 12/28/17 1036   Pressure Ulcer Present on Admission: yes  Side: Right  Location: buttocks  Staging: Unstageable   Wound Image    Staging Unstageable   Pressure Ulcer Risk Factors activity;mobility;nutrition;shear/friction;moisture   Drainage Amount none   Drainage Characteristics/Odor no odor   Appearance moist;yellow;black eschar   Periwound Area redness;moist  (multiple areas healed scar tissue)   Wound Edges open   Wound Length (cm) 9   Wound Width (cm) 6   Depth (cm) 0.1   Interventions (Triad barrier cream applied)       Pressure Ulcer 12/28/17 1036 Right lateral heel Unstageable   Date First Assessed/Time First Assessed: 12/28/17 1036   Pressure Ulcer Present on Admission: yes  Side: Right  Orientation: lateral  Location: heel  Staging: Unstageable   Wound Image    Staging Unstageable   Pressure Ulcer Risk Factors activity;mobility   Drainage Amount none   Drainage Characteristics/Odor no odor   Appearance black eschar   Periwound Area dry   Wound Edges open   Wound Length (cm) 0.5   Wound Width (cm) 0.5   Depth (cm) 0   Interventions povidone-iodine applied

## 2017-12-28 NOTE — H&P
[facility] Hospital Medicine  History & Physical    Patient Name: Kim Bah  MRN: [id number]  Admission Date: 2017  Attending Physician: Chaparro Jarvis MD  Primary Care Provider: Dolly Blackwell MD    [facility] Medicine Team: [facility]OSP MED A Chaparro Jarvis MD     Patient information was obtained from relative(s) and ER records.     Subjective:     Principal Problem:<principal problem not specified>    Chief Complaint:   Chief Complaint   Patient presents with    Edema        HPI: 85-year-old female with history of CAD, dementia, RA, thyroid disease presents for evaluation of edema.  Per daughter, who is patient's primary caregiver, patient has had progressively worsening edema to the upper extremities and upper thighs over the past week.  Daughter also notes that the patient had a pressure ulcer about one week ago.  Home health recently changed the dressings which daughter feels have worsened the patient's wounds.  She also notes new scattered surrounding ulcers.  She denies any changes in mental status, vomiting or diarrhea, black or bloody stool, fever.  The patient has never complained of any pain.    Pt w/ recent UTI about 1 wk ago and started on Bactrim. Poor oral intake but encouraged by family to eat.     Pt was under hospice care but daughter rescinded this and would like medical eval. PCP arrived at bedside to discuss and family insisted on admission and w/u.    Past Medical History:   Diagnosis Date    Arthritis     Coronary artery disease involving native coronary artery of native heart without angina pectoris 2017    Dementia     Difficult intubation     Rheumatoid arthritis(714.0)     follows with Dr. Dayron Babb     Right Sided     Thyroid disease     thyroidectomy 20 years ago       Past Surgical History:   Procedure Laterality Date     SECTION      EYE SURGERY      FRACTURE SURGERY      right femur with pin implants     HERNIA  REPAIR      HYSTERECTOMY      THYROIDECTOMY         Review of patient's allergies indicates:   Allergen Reactions    Tramadol Other (See Comments)     Hallucinations; psychosis       Current Facility-Administered Medications on File Prior to Encounter   Medication    0.9%  NaCl infusion     Current Outpatient Prescriptions on File Prior to Encounter   Medication Sig    acetaminophen (TYLENOL) 650 MG TbSR Take 1 tablet (650 mg total) by mouth every 8 (eight) hours as needed (for pain uncontrolled by Naproxen).    aspirin (ECOTRIN) 81 MG EC tablet Take 1 tablet (81 mg total) by mouth once daily.    cholecalciferol, vitamin D3, (VITAMIN D3) 5,000 unit Tab Take 10,000 Units by mouth once a week.    DOXYLAMINE SUCCINATE (SLEEP AID, DOXYLAMINE, ORAL) Take 1 tablet by mouth nightly as needed (for sleep).    lidocaine-prilocaine (EMLA) cream Apply topically daily as needed. For pain.    memantine (NAMENDA) 5 MG Tab TAKE 1 TABLET(5 MG) BY MOUTH EVERY DAY    naproxen sodium (ANAPROX) 220 MG tablet Take 220 mg by mouth daily as needed (for pain).    pantoprazole (PROTONIX) 40 MG tablet Take 1 tablet (40 mg total) by mouth once daily.    sulfamethoxazole-trimethoprim 800-160mg (BACTRIM DS) 800-160 mg Tab Take 1 tablet by mouth 2 (two) times daily.     Family History     Problem Relation (Age of Onset)    Cancer Maternal Aunt        Social History Main Topics    Smoking status: Never Smoker    Smokeless tobacco: Never Used    Alcohol use No    Drug use: No    Sexual activity: Not Currently     Partners: Female     Birth control/ protection: Abstinence     Review of Systems   Constitutional: Negative for activity change and appetite change (poor baseline).   HENT: Negative for trouble swallowing.    Eyes: Negative for visual disturbance.   Respiratory: Positive for shortness of breath.    Cardiovascular: Positive for leg swelling. Negative for chest pain.   Gastrointestinal: Negative for abdominal pain.    Genitourinary: Positive for difficulty urinating.   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 Feet w/ limited movement. (baseline)   Skin:        Sacral ulcers   Neurological:        No ams     Objective:     Vital Signs (Most Recent):  Temp: 97.4 °F (36.3 °C) (12/27/17 1802)  Pulse: 103 (12/27/17 1802)  Resp: 18 (12/27/17 1802)  BP: 102/61 (12/27/17 1802)  SpO2: 95 % (12/27/17 1720) Vital Signs (24h Range):  Temp:  [97.4 °F (36.3 °C)-98.2 °F (36.8 °C)] 97.4 °F (36.3 °C)  Pulse:  [] 103  Resp:  [16-24] 18  SpO2:  [65 %-100 %] 95 %  BP: ()/(43-80) 102/61     Weight: 54.4 kg (120 lb)  Body mass index is 23.44 kg/m².    Physical Exam    Nursing note and vitals reviewed.  Constitutional: She is not diaphoretic.  Non-toxic appearance. She does not appear ill. No distress.   HENT:   Head: Normocephalic and atraumatic.   Mouth/Throat: Mucous membranes are normal.   Neck: Neck supple.   Cardiovascular: Regular rhythm. Tachycardia present.  Exam reveals no gallop and no friction rub.    No murmur heard.  2+ edema noted to bilateral hands up to mid upper arm, dependent edema to loose skin. 2+ edema to the upper legs. 1+ edema extending to the lower legs. DP pulses intact bilaterally.    Pulmonary/Chest: Effort normal and breath sounds normal. No accessory muscle usage. No tachypnea. No respiratory distress. She has no decreased breath sounds. She has no wheezes. She has no rhonchi. She has no rales.   Abdominal: Normal appearance. She exhibits no distension. There is no tenderness   Musculoskeletal: Normal range of motion.   Neurological: She is alert but limited verbal ability and orientation difficult to assess  Skin: Skin is warm and dry. No pallor.   Sacral decubitus ulcers noted. No significant surrounding erythema. No purulent drainage. R foot heel ulcer. Dry skin w/ cracking  Psychiatric: She has a normal mood and affect. Her behavior is normal. Judgment and thought content  normal.    Significant Labs: All pertinent labs within the past 24 hours have been reviewed.    Significant Imaging: I have reviewed and interpreted all pertinent imaging results/findings within the past 24 hours.    Assessment/Plan:     Active Diagnoses:    Diagnosis Date Noted POA    Pneumonia of lower lobe due to infectious organism [J18.1] 12/27/2017 Yes      Problems Resolved During this Admission:    Diagnosis Date Noted Date Resolved POA     VTE Risk Mitigation         Ordered     Medium Risk of VTE  Once      12/27/17 1216     Place ANABEL hose  Until discontinued      12/27/17 1216     Place sequential compression device  Until discontinued      12/27/17 1216        86 y/o AAF w/ dementia brought in by family for edema.     Edema  Likely from acute on chronic diastolic heart failure and malnutrition. BNP elevated to 214 and TTE in May w/ G2DD and PAP 40 (pulmonary hypertension). Albumin 1.1 in chronically undernourished pt. Family does not want NGT or PEG tube at this time.   -Regular diet, encourage oral intake    Pt meets criteria for severe sepsis and nearly septic shock  HR to 134, BP 65/43 at admit and improved w/ 500cc fluid bolus but down to 80s/40s during my assessment, back to 110s/60s w/ additional fluid bolus. Suspected source would be PNA and pt given CTX in Ed. However, no WBC inc, temp, or hypoxia. LA >2 but likely from dehydration and poor oral intake. More likely CXR indicates b/l pleural effusions (likely from same processes causing edema as above) seen previously but worsening and unable to r/o infection. Will continue CTX given risks/benefits and current goals of care.   -Rocephin 1g Q24h  -Fluid boluses as needed for Bp, but eventually will third space these; which will then make ulcers worse.     Sacral ulcers  -wound care consult    Dementia  memantine    GERD  PPI    Anemia  Chronic ~11, 9.2 today. Daily CBC    Thrombocytopenia  New findings. Possible marrow suppression from  malnutrition. Monitor. Could be from sepsis as above.     Continue Vit D and ASA    Chaparro Jarvis MD  Department of Hospital Medicine   [facility]

## 2017-12-29 ENCOUNTER — TELEPHONE (OUTPATIENT)
Dept: INTERNAL MEDICINE | Facility: CLINIC | Age: 82
End: 2017-12-29

## 2017-12-29 LAB
25(OH)D3+25(OH)D2 SERPL-MCNC: 40 NG/ML
BACTERIA UR CULT: NORMAL
BASOPHILS # BLD AUTO: 0.01 K/UL
BASOPHILS NFR BLD: 0.2 %
DIFFERENTIAL METHOD: ABNORMAL
EOSINOPHIL # BLD AUTO: 0.1 K/UL
EOSINOPHIL NFR BLD: 1.9 %
ERYTHROCYTE [DISTWIDTH] IN BLOOD BY AUTOMATED COUNT: 16.5 %
FERRITIN SERPL-MCNC: 424 NG/ML
FOLATE SERPL-MCNC: 5.5 NG/ML
HCT VFR BLD AUTO: 24.2 %
HGB BLD-MCNC: 8.5 G/DL
IMM GRANULOCYTES # BLD AUTO: 0.02 K/UL
IMM GRANULOCYTES NFR BLD AUTO: 0.4 %
IRON SERPL-MCNC: 43 UG/DL
LYMPHOCYTES # BLD AUTO: 2 K/UL
LYMPHOCYTES NFR BLD: 34.8 %
MCH RBC QN AUTO: 29.8 PG
MCHC RBC AUTO-ENTMCNC: 35.1 G/DL
MCV RBC AUTO: 85 FL
MONOCYTES # BLD AUTO: 0.3 K/UL
MONOCYTES NFR BLD: 6 %
NEUTROPHILS # BLD AUTO: 3.2 K/UL
NEUTROPHILS NFR BLD: 56.7 %
NRBC BLD-RTO: 1 /100 WBC
PLATELET # BLD AUTO: ABNORMAL K/UL
PLATELET BLD QL SMEAR: ABNORMAL
PMV BLD AUTO: ABNORMAL FL
RBC # BLD AUTO: 2.85 M/UL
RETICS/RBC NFR AUTO: 0.9 %
SATURATED IRON: 69 %
TOTAL IRON BINDING CAPACITY: 62 UG/DL
TRANSFERRIN SERPL-MCNC: 42 MG/DL
VIT B12 SERPL-MCNC: 909 PG/ML
WBC # BLD AUTO: 5.66 K/UL

## 2017-12-29 PROCEDURE — 85025 COMPLETE CBC W/AUTO DIFF WBC: CPT

## 2017-12-29 PROCEDURE — 36415 COLL VENOUS BLD VENIPUNCTURE: CPT

## 2017-12-29 PROCEDURE — 82746 ASSAY OF FOLIC ACID SERUM: CPT

## 2017-12-29 PROCEDURE — 85045 AUTOMATED RETICULOCYTE COUNT: CPT

## 2017-12-29 PROCEDURE — 83540 ASSAY OF IRON: CPT

## 2017-12-29 PROCEDURE — 82728 ASSAY OF FERRITIN: CPT

## 2017-12-29 PROCEDURE — 82607 VITAMIN B-12: CPT

## 2017-12-29 PROCEDURE — 25000003 PHARM REV CODE 250: Performed by: HOSPITALIST

## 2017-12-29 PROCEDURE — 11000001 HC ACUTE MED/SURG PRIVATE ROOM

## 2017-12-29 PROCEDURE — 83921 ORGANIC ACID SINGLE QUANT: CPT

## 2017-12-29 PROCEDURE — 63600175 PHARM REV CODE 636 W HCPCS: Performed by: HOSPITALIST

## 2017-12-29 PROCEDURE — 82306 VITAMIN D 25 HYDROXY: CPT

## 2017-12-29 PROCEDURE — 99232 SBSQ HOSP IP/OBS MODERATE 35: CPT | Mod: ,,, | Performed by: HOSPITALIST

## 2017-12-29 RX ORDER — AMOXICILLIN AND CLAVULANATE POTASSIUM 875; 125 MG/1; MG/1
1 TABLET, FILM COATED ORAL 2 TIMES DAILY
Qty: 4 TABLET | Refills: 0 | Status: SHIPPED | OUTPATIENT
Start: 2017-12-29 | End: 2018-01-05

## 2017-12-29 RX ADMIN — CEFTRIAXONE SODIUM 1 G: 1 INJECTION, POWDER, FOR SOLUTION INTRAMUSCULAR; INTRAVENOUS at 02:12

## 2017-12-29 RX ADMIN — HYPROMELLOSE 2910 1 DROP: 5 SOLUTION OPHTHALMIC at 02:12

## 2017-12-29 RX ADMIN — NAPROXEN 250 MG: 250 TABLET ORAL at 09:12

## 2017-12-29 RX ADMIN — HYPROMELLOSE 2910 1 DROP: 5 SOLUTION OPHTHALMIC at 09:12

## 2017-12-29 RX ADMIN — PANTOPRAZOLE SODIUM 40 MG: 40 TABLET, DELAYED RELEASE ORAL at 09:12

## 2017-12-29 RX ADMIN — HYPROMELLOSE 2910 1 DROP: 5 SOLUTION OPHTHALMIC at 05:12

## 2017-12-29 NOTE — PLAN OF CARE
3:49 PM  [facility] arranged transportation in will call with Brian for 10:00AM on 12/30/17.      On call  to call N with authorization for ambulance tomorrow [phone].   Faxed home health orders to [facility].     Roxi Kidd LMSW   [facility]  Ext [phone]

## 2017-12-29 NOTE — PLAN OF CARE
[facility]    HOME HEALTH ORDERS  FACE TO FACE ENCOUNTER    Patient Name: Kim Bah  YOB: 1932    PCP: Dolly Blackwell MD   PCP Address: [address] / [place] [address]  PCP Phone Number: [phone]  PCP Fax: [phone]    Discharging Team(s): [facility] A    Encounter Date: 12/29/2017    Admit to Home Health    Diagnoses:  Active Hospital Problems    Diagnosis  POA    *Pneumonia of lower lobe due to infectious organism [J18.1]  Yes    Unstageable pressure injury of skin and tissue [L89.95]  Yes    Dehydration [E86.0]  Yes     Dehydration due to UTI and decreased PO intake      Late onset Alzheimer's disease with behavioral disturbance [G30.1, F02.81]  Yes     Chronic     Bedbound status, minimally interactive      Anemia of chronic disorder [D63.8]  Yes     Chronic    Mild malnutrition [E44.1]  Yes      Resolved Hospital Problems    Diagnosis Date Resolved POA   No resolved problems to display.       No future appointments.        I have seen and examined this patient face to face today. My clinical findings that support the need for the home health skilled services and home bound status are the following:  Weakness/numbness causing balance and gait disturbance due to chronically bed bound making it taxing to leave home.    Allergies:  Review of patient's allergies indicates:   Allergen Reactions    Tramadol Other (See Comments)     Hallucinations; psychosis       Diet: regular diet    Activities: activity as tolerated    Nursing:   SN to complete comprehensive assessment including routine vital signs. Instruct on disease process and s/s of complications to report to MD. Review/verify medication list sent home with the patient at time of discharge  and instruct patient/caregiver as needed. Frequency may be adjusted depending on start of care date.    Notify MD if SBP > 160 or < 90; DBP > 90 or < 50; HR > 120 or < 50; Temp > 101      CONSULTS:     Aide to provide assistance with personal care, ADLs, and vital signs.    WOUND CARE ORDERS  Sacral pressure injury: Triad barrier cream qshift and as needed. (Triad left at bedside)  Turn every 2hrs  Right heel pressure injury: paint daily with betadine  Ehob heel boots, boots on patient.    Medications: Review discharge medications with patient and family and provide education.      Current Discharge Medication List      START taking these medications    Details   amoxicillin-clavulanate 875-125mg (AUGMENTIN) 875-125 mg per tablet Take 1 tablet by mouth 2 (two) times daily.  Qty: 4 tablet, Refills: 0         CONTINUE these medications which have NOT CHANGED    Details   aspirin (ECOTRIN) 81 MG EC tablet Take 1 tablet (81 mg total) by mouth once daily.  Refills: 0      memantine (NAMENDA) 5 MG Tab TAKE 1 TABLET(5 MG) BY MOUTH EVERY DAY  Qty: 30 tablet, Refills: 0    Comments: **Patient requests 90 days supply**  Associated Diagnoses: Cognitive impairment      naproxen sodium (ANAPROX) 220 MG tablet Take 220 mg by mouth daily as needed (for pain).      acetaminophen (TYLENOL) 650 MG TbSR Take 1 tablet (650 mg total) by mouth every 8 (eight) hours as needed (for pain uncontrolled by Naproxen).  Refills: 0      DOXYLAMINE SUCCINATE (SLEEP AID, DOXYLAMINE, ORAL) Take 1 tablet by mouth nightly as needed (for sleep).      pantoprazole (PROTONIX) 40 MG tablet Take 1 tablet (40 mg total) by mouth once daily.  Qty: 15 tablet, Refills: 0         STOP taking these medications       cholecalciferol, vitamin D3, (VITAMIN D3) 5,000 unit Tab Comments:   Reason for Stopping:         lidocaine-prilocaine (EMLA) cream Comments:   Reason for Stopping:         sulfamethoxazole-trimethoprim 800-160mg (BACTRIM DS) 800-160 mg Tab Comments:   Reason for Stopping:               I certify that this patient is confined to her home and needs intermittent skilled nursing care.

## 2017-12-29 NOTE — PLAN OF CARE
PT is AAOX3, no acute changes noted during shift, pt has been on bedrest and repositioned q2, no additional skin breakdown noted, VSS. No falls noted. Fall precautions remain Pain assessed. No signs of pain noted. Pt resting comfortable in bed. Call light in reach. Will continue to monitor.

## 2017-12-29 NOTE — PLAN OF CARE
The above stated patient condition is supported in the patient's medical record and demonstrates the need for ambulance transportation. Ambulance service is hereby requested.  2017 3:29 PM     Kim Bah   1932  [id number]    This pt will require stretcher transport home as she has been bed bound for several years, is unable to sit-up safely in a w/c, has periods of confusion, is non-verbal, and is unable to follow commands consistently.

## 2017-12-29 NOTE — DISCHARGE SUMMARY
[facility] Hospital Medicine  Discharge Summary      Patient Name: Kim Bah  MRN: [id number]  Admission Date: 12/27/2017  Hospital Length of Stay: 2 days  Discharge Date and Time:  12/30/2017 07:00AM  Attending Physician: Chaparro Peck MD   Discharging Provider: Chaparro Peck MD  Primary Care Provider: Dolly Blackwell MD    Hospital Medicine Team: [facility] A Chaparro Peck MD       HPI: 85-year-old female with history of CAD, dementia, RA, thyroid disease presents for evaluation of edema.  Per daughter, who is patient's primary caregiver, patient has had progressively worsening edema to the upper extremities and upper thighs over the past week.  Daughter also notes that the patient had a pressure ulcer about one week ago.  Home health recently changed the dressings which daughter feels have worsened the patient's wounds.  She also notes new scattered surrounding ulcers.  She denies any changes in mental status, vomiting or diarrhea, black or bloody stool, fever.  The patient has never complained of any pain.     Pt w/ recent UTI about 1 wk ago and started on Bactrim. Poor oral intake but encouraged by family to eat.      Pt was under hospice care but daughter rescinded this and would like medical eval. PCP arrived at bedside to discuss and family insisted on admission and w/u.     12/28: BP stable ranging from 90s-100s/50s w/o further fluid bolus. Daughter states that pt w/ R eye discomfort and intermittent back pain from ulcers. Mental status is changed and pt w/o any other complaints.      12/29: pt doing well this am. She was more talkative w/o daughter present and expressed eagerness to leave hospital and return home. Denies dysuria, f/c, cough.        Consults:   Consults         Status Ordering Provider     Inpatient consult to Registered Dietitian/Nutritionist  Once     Provider:  (Not yet assigned)    Completed CHAPARRO PECK     IP consult to case management  Once      Provider:  (Not yet assigned)    Acknowledged RADHA PECK          Final Active Diagnoses:    Diagnosis Date Noted POA    PRINCIPAL PROBLEM:  Pneumonia of lower lobe due to infectious organism [J18.1] 12/27/2017 Yes    Unstageable pressure injury of skin and tissue [L89.95] 12/28/2017 Yes    Dehydration [E86.0] 11/13/2017 Yes    Late onset Alzheimer's disease with behavioral disturbance [G30.1, F02.81] 05/20/2017 Yes     Chronic    Anemia of chronic disorder [D63.8] 06/24/2013 Yes     Chronic    Mild malnutrition [E44.1] 06/23/2013 Yes      Problems Resolved During this Admission:    Diagnosis Date Noted Date Resolved POA      Discharged Condition: stable    Disposition: Home or Self Care    Follow Up:    Patient Instructions:   No discharge procedures on file.  Medications:  Reconciled Home Medications:   Current Discharge Medication List      START taking these medications    Details   amoxicillin-clavulanate 875-125mg (AUGMENTIN) 875-125 mg per tablet Take 1 tablet by mouth 2 (two) times daily.  Qty: 4 tablet, Refills: 0         CONTINUE these medications which have NOT CHANGED    Details   aspirin (ECOTRIN) 81 MG EC tablet Take 1 tablet (81 mg total) by mouth once daily.  Refills: 0      memantine (NAMENDA) 5 MG Tab TAKE 1 TABLET(5 MG) BY MOUTH EVERY DAY  Qty: 30 tablet, Refills: 0    Comments: **Patient requests 90 days supply**  Associated Diagnoses: Cognitive impairment      naproxen sodium (ANAPROX) 220 MG tablet Take 220 mg by mouth daily as needed (for pain).      acetaminophen (TYLENOL) 650 MG TbSR Take 1 tablet (650 mg total) by mouth every 8 (eight) hours as needed (for pain uncontrolled by Naproxen).  Refills: 0      DOXYLAMINE SUCCINATE (SLEEP AID, DOXYLAMINE, ORAL) Take 1 tablet by mouth nightly as needed (for sleep).      pantoprazole (PROTONIX) 40 MG tablet Take 1 tablet (40 mg total) by mouth once daily.  Qty: 15 tablet, Refills: 0         STOP taking these medications        cholecalciferol, vitamin D3, (VITAMIN D3) 5,000 unit Tab Comments:   Reason for Stopping:         lidocaine-prilocaine (EMLA) cream Comments:   Reason for Stopping:         sulfamethoxazole-trimethoprim 800-160mg (BACTRIM DS) 800-160 mg Tab Comments:   Reason for Stopping:               Significant Diagnostic Studies: Labs: All labs within the past 24 hours have been reviewed    Pending Diagnostic Studies:     Procedure Component Value Units Date/Time    Methylmalonic acid, serum [[id number]] Collected:  12/29/17 0636    Order Status:  Sent Lab Status:  In process Updated:  12/29/17 0702    Specimen:  Blood from Blood     Narrative:       Collection has been rescheduled by MATT at 12/29/2017 04:51 Reason: 2   attempts unable to collect         Indwelling Lines/Drains at time of discharge:   Lines/Drains/Airways     Pressure Ulcer                 Pressure Ulcer 12/28/17 1036 Right buttocks Unstageable 1 day         Pressure Ulcer 12/28/17 1036 Right lateral heel Unstageable 1 day             Nursing note and vitals reviewed.  Constitutional: She is not diaphoretic.  Non-toxic appearance. She does not appear ill. No distress. More alert today than yesterday but limited verbal ability, and some difficulty following commands (appears to be baseline)  HENT: PERRL, EOMI, no erythema, edema or tenderness of either eye  Head: Normocephalic and atraumatic.   Mouth/Throat: Mucous membranes are normal.   Neck: Neck supple.   Cardiovascular: Regular rhythm. Tachycardia present.  Exam reveals no gallop and no friction rub.    No murmur heard.  2+ edema noted to bilateral hands up to mid upper arm, dependent edema to loose skin. 2+ edema to the upper legs. 1+ edema extending to the lower legs. DP pulses intact bilaterally.    Pulmonary/Chest: Effort normal and breath sounds normal. No accessory muscle usage. No tachypnea. No respiratory distress. She has no decreased breath sounds. She has no wheezes. She has no rhonchi. She has no  "rales.   Abdominal: Normal appearance. She exhibits no distension. There is no tenderness   Musculoskeletal: Normal range of motion.   Neurological: She is alert but limited verbal ability and orientation difficult to assess  Skin: Skin is warm and dry. No pallor.   Unstageable sacral decubitus ulcer. No significant surrounding erythema. No purulent drainage. R foot heel ulcer, unstageable. Dry skin w/ cracking  Psychiatric: She has a normal mood and affect. Her behavior is normal. Judgment and thought content normal.     86 y/o AAF w/ dementia brought in by family for edema.      Edema  Likely from acute on chronic diastolic heart failure and malnutrition. BNP elevated to 214 and TTE in May w/ G2DD and PAP 40 (pulmonary hypertension). Albumin 1.1 in chronically undernourished pt. Family does not want NGT or PEG tube at this time. Discussed goals of care with family again and informed them that most of the issues that we were attempting to address were not from any acute change and likely from progression of chronic illness in the pt. The family is not entirely clear in presenting their goals during admission. The daughter would like the edema to improve, the BP to improve and the ulcers to improve. I communicated that pt is likely intravascularly depleted and BP is low from this but has third spacing and this is most likely d/t the above causes, with nutrition probably the most important. I told the daughter that nutrition improvement will take time and may not happen at all. Without this, third spacing will worsen, skin will stretch and ulcers will get worse. We can support BP w/ IVF bolus prn but it will also eventually leak out. Again broached the subject of hospice and daughter more interested in home health. She would like to readdress everything tomorrow and hoping to stay until Saturday. When pt asked about her goals of care she reinforced not wanting to be intubated and stated "crystal knows" referring to " "PCP. Daughter, Alicia Parker, seems dissatisfied with explanation of clinical presentation of edema from G2DD and hypoalbuminemia 2/2 poor oral intake and thus leading to sacral ulcers in bed bound pt. She would like a "second opinion" and would like several inappropriate consults (ID, Cardiology) and I have advised her that we do not have any clinical questions for them and no additional benefit would come to the pt, who clearly wants to go home. Pt refused to eat lunch including Boost, despite PCT assistance. Daughter able to get her to drink boost. I spent >20min on the phone explaining that situation and that no management is being performed in the hospital that is of benefit to the pt that could not be performed out-pt, aside from the 500cc NS boluses for low BP that are temporary measures and do not improve the underlying cause. Mother asked about potassium and this was discussed. She asked about TTE results and this was discussed. We reviewed Cr and renal function. She wants another day of IV abx and wants to see the ulcers improve, and she was told that infection causing presentation is unlikely and oral abx can be used, also told that ulcer will not improve in short term, and maybe not at all if nutrition and albumin do not improve. Daughter refuses to accept pt today and will agree to discharge tomorrow Am. Alicia Parker would like cardiology f/u and new PCP but pt is unable to travel to doctor's appointments  -Regular diet, encourage oral intake  -Add Boost w/ meals, nutrition consult  -would not start BB in setting of low BP     Pt met criteria for severe sepsis and nearly septic shock at presentation  HR still in 100s. Suspected source would be PNA and pt given CTX in Ed. However, no WBC inc, temp, or hypoxia. LA >2 but likely from dehydration and poor oral intake (now 1.6 after fluids). More likely CXR indicates b/l pleural effusions (likely from same processes causing edema as above) seen " previously but worsening and radiology read unable to r/o infection. Will continue CTX given risks/benefits and current goals of care. Procal negative, ESR negative, CRP up to 20 (but could be from sacral lesions). UTI UA w/ Ecoli 100k col, pt w/o Sx, but daughter would like treatment.  -Rocephin 1g Q24h, will switch to oral augmentin based on UrCx sens and coverage for possible CAP at discharge for 5d total course     Sacral ulcers  -wound care consult. Applied cream to area. Home health orders placed w/ instructions.      Dementia  memantine     GERD  PPI     Anemia  Chronic ~11, 9.2 today, down to 8 today. Unclear cause. Last labs in 2012 w/ e/o APURVA but nl ferritin. B12 prev 290. TSH has been nl.   Daily CBC, will check, Fe panel, ferritin, B12, Fa, MMA, retic  -hold asa     Thrombocytopenia  New findings at admission, now resolved. Possible marrow suppression from malnutrition. Monitor. Could be from sepsis as above.      Eye discomfort  Artifical tears ordered     Last Vit D in 2012 was 53, repeat 40. Pt can decide not to take med     Time spent on the discharge of patient: <30 minutes  Patient was seen and examined on the date of discharge and determined to be suitable for discharge.         Chaparro Jarvis MD  Department of Hospital Medicine  [facility]

## 2017-12-29 NOTE — PLAN OF CARE
MARYBETH rec'd call from Dr Jarvis who is writing hh orders now for dc tomorrow am. Roxi notified to set up hh  MARYBETH notified Roxi that pt will need transport and she will place in will call  Cm called PCP for f/u appt to be made.  CM called PCP office and they will send msg to PCP's office to make appt with Dr. Blackwell.  CM provided pt's daughter's' name/phone number and they will contact her w appt date/time [facility] pt being dc tomorow

## 2017-12-29 NOTE — PROGRESS NOTES
[facility] Hospital Medicine  Progress Note    Patient Name: Kim Bah  MRN: [id number]  Patient Class: IP- Inpatient   Admission Date: 12/27/2017  Length of Stay: 2 days  Attending Physician: Chaparro Jarvis MD  Primary Care Provider: Dolly Blackwell MD    [facility] Medicine Team: [facility] A Chaparro Jarvis MD    Subjective:     Principal Problem:Pneumonia of lower lobe due to infectious organism    HPI: 85-year-old female with history of CAD, dementia, RA, thyroid disease presents for evaluation of edema.  Per daughter, who is patient's primary caregiver, patient has had progressively worsening edema to the upper extremities and upper thighs over the past week.  Daughter also notes that the patient had a pressure ulcer about one week ago.  Home health recently changed the dressings which daughter feels have worsened the patient's wounds.  She also notes new scattered surrounding ulcers.  She denies any changes in mental status, vomiting or diarrhea, black or bloody stool, fever.  The patient has never complained of any pain.     Pt w/ recent UTI about 1 wk ago and started on Bactrim. Poor oral intake but encouraged by family to eat.      Pt was under hospice care but daughter rescinded this and would like medical eval. PCP arrived at bedside to discuss and family insisted on admission and w/u.    12/28: BP stable ranging from 90s-100s/50s w/o further fluid bolus. Daughter states that pt w/ R eye discomfort and intermittent back pain from ulcers. Mental status is changed and pt w/o any other complaints.     12/29: pt doing well this am. She was more talkative w/o daughter present and expressed eagerness to leave hospital and return home. Denies dysuria, f/c, cough.     Review of Systems  Objective:     Vital Signs (Most Recent):  Temp: 97.8 °F (36.6 °C) (12/29/17 1607)  Pulse: (!) 111 (12/29/17 1607)  Resp: 19 (12/29/17 1607)  BP: 111/64 (12/29/17 1607)  SpO2: (!) 94 % (12/29/17 1236)  Vital Signs (24h Range):  Temp:  [97.1 °F (36.2 °C)-97.8 °F (36.6 °C)] 97.8 °F (36.6 °C)  Pulse:  [111-125] 111  Resp:  [16-19] 19  SpO2:  [94 %-95 %] 94 %  BP: ()/(49-65) 111/64     Weight: 43.1 kg (95 lb 1.6 oz)  Body mass index is 18.57 kg/m².    Intake/Output Summary (Last 24 hours) at 12/29/17 1641  Last data filed at 12/29/17 0500   Gross per 24 hour   Intake               60 ml   Output               75 ml   Net              -15 ml      Physical Exam   Nursing note and vitals reviewed.  Constitutional: She is not diaphoretic.  Non-toxic appearance. She does not appear ill. No distress. More alert today than yesterday but limited verbal ability, and some difficulty following commands (appears to be baseline)  HENT: PERRL, EOMI, no erythema, edema or tenderness of either eye  Head: Normocephalic and atraumatic.   Mouth/Throat: Mucous membranes are normal.   Neck: Neck supple.   Cardiovascular: Regular rhythm. Tachycardia present.  Exam reveals no gallop and no friction rub.    No murmur heard.  2+ edema noted to bilateral hands up to mid upper arm, dependent edema to loose skin. 2+ edema to the upper legs. 1+ edema extending to the lower legs. DP pulses intact bilaterally.    Pulmonary/Chest: Effort normal and breath sounds normal. No accessory muscle usage. No tachypnea. No respiratory distress. She has no decreased breath sounds. She has no wheezes. She has no rhonchi. She has no rales.   Abdominal: Normal appearance. She exhibits no distension. There is no tenderness   Musculoskeletal: Normal range of motion.   Neurological: She is alert but limited verbal ability and orientation difficult to assess  Skin: Skin is warm and dry. No pallor.   Unstageable sacral decubitus ulcer. No significant surrounding erythema. No purulent drainage. R foot heel ulcer, unstageable. Dry skin w/ cracking  Psychiatric: She has a normal mood and affect. Her behavior is normal. Judgment and thought  content normal.    Significant Labs: All pertinent labs within the past 24 hours have been reviewed.    Significant Imaging: I have reviewed and interpreted all pertinent imaging results/findings within the past 24 hours.    Assessment/Plan:      Active Diagnoses:    Diagnosis Date Noted POA    PRINCIPAL PROBLEM:  Pneumonia of lower lobe due to infectious organism [J18.1] 12/27/2017 Yes    Unstageable pressure injury of skin and tissue [L89.95] 12/28/2017 Yes    Dehydration [E86.0] 11/13/2017 Yes    Late onset Alzheimer's disease with behavioral disturbance [G30.1, F02.81] 05/20/2017 Yes     Chronic    Anemia of chronic disorder [D63.8] 06/24/2013 Yes     Chronic    Mild malnutrition [E44.1] 06/23/2013 Yes      Problems Resolved During this Admission:    Diagnosis Date Noted Date Resolved POA     VTE Risk Mitigation         Ordered     Place sequential compression device  Until discontinued      12/27/17 1945     Medium Risk of VTE  Once      12/27/17 1216     Place ANABEL hose  Until discontinued      12/27/17 1216     Place sequential compression device  Until discontinued      12/27/17 1216         84 y/o AAF w/ dementia brought in by family for edema.      Edema  Likely from acute on chronic diastolic heart failure and malnutrition. BNP elevated to 214 and TTE in May w/ G2DD and PAP 40 (pulmonary hypertension). Albumin 1.1 in chronically undernourished pt. Family does not want NGT or PEG tube at this time. Discussed goals of care with family again and informed them that most of the issues that we were attempting to address were not from any acute change and likely from progression of chronic illness in the pt. The family is not entirely clear in presenting their goals during admission. The daughter would like the edema to improve, the BP to improve and the ulcers to improve. I communicated that pt is likely intravascularly depleted and BP is low from this but has third spacing and this is most likely d/t the  "above causes, with nutrition probably the most important. I told the daughter that nutrition improvement will take time and may not happen at all. Without this, third spacing will worsen, skin will stretch and ulcers will get worse. We can support BP w/ IVF bolus prn but it will also eventually leak out. Again broached the subject of hospice and daughter more interested in home health. She would like to readdress everything tomorrow and hoping to stay until Saturday. When pt asked about her goals of care she reinforced not wanting to be intubated and stated "crystal knows" referring to PCP. Daughter, Alicia Parker, seems dissatisfied with explanation of clinical presentation of edema from G2DD and hypoalbuminemia 2/2 poor oral intake and thus leading to sacral ulcers in bed bound pt. She would like a "second opinion" and would like several inappropriate consults (ID, Cardiology) and I have advised her that we do not have any clinical questions for them and no additional benefit would come to the pt, who clearly wants to go home. Pt refused to eat lunch including Boost, despite PCT assistance. Daughter able to get her to drink boost. I spent >20min on the phone explaining that situation and that no management is being performed in the hospital that is of benefit to the pt that could not be performed out-pt, aside from the 500cc NS boluses for low BP that are temporary measures and do not improve the underlying cause. Mother asked about potassium and this was discussed. She asked about TTE results and this was discussed. We reviewed Cr and renal function. She wants another day of IV abx and wants to see the ulcers improve, and she was told that infection causing presentation is unlikely and oral abx can be used, also told that ulcer will not improve in short term, and maybe not at all if nutrition and albumin do not improve. Daughter refuses to accept pt today and will agree to discharge tomorrow Am. Alicia Parker " would like cardiology f/u and new PCP but pt is unable to travel to doctor's appointments  -Regular diet, encourage oral intake  -Add Boost w/ meals, nutrition consult  -would not start BB in setting of low BP     Pt met criteria for severe sepsis and nearly septic shock at presentation  HR still in 100s. Suspected source would be PNA and pt given CTX in Ed. However, no WBC inc, temp, or hypoxia. LA >2 but likely from dehydration and poor oral intake (now 1.6 after fluids). More likely CXR indicates b/l pleural effusions (likely from same processes causing edema as above) seen previously but worsening and radiology read unable to r/o infection. Will continue CTX given risks/benefits and current goals of care. Procal negative, ESR negative, CRP up to 20 (but could be from sacral lesions). UTI UA w/ Ecoli 100k col, pt w/o Sx, but daughter would like treatment.  -Rocephin 1g Q24h, will switch to oral augmentin based on UrCx sens and coverage for possible CAP at discharge for 5d total course     Sacral ulcers  -wound care consult. Applied cream to area. Home health orders placed w/ instructions.      Dementia  memantine     GERD  PPI     Anemia  Chronic ~11, 9.2 today, down to 8 today. Unclear cause. Last labs in 2012 w/ e/o APURVA but nl ferritin. B12 prev 290. TSH has been nl.   Daily CBC, will check, Fe panel, ferritin, B12, Fa, MMA, retic  -hold asa     Thrombocytopenia  New findings at admission, now resolved. Possible marrow suppression from malnutrition. Monitor. Could be from sepsis as above.     Eye discomfort  Artifical tears ordered     Last Vit D in 2012 was 53, repeat 40. Pt can decide not to take med     Chaparro Jarvis MD  Department of Hospital Medicine   [facility]

## 2017-12-30 VITALS
WEIGHT: 95.13 LBS | DIASTOLIC BLOOD PRESSURE: 76 MMHG | HEIGHT: 60 IN | RESPIRATION RATE: 16 BRPM | BODY MASS INDEX: 18.68 KG/M2 | TEMPERATURE: 98 F | SYSTOLIC BLOOD PRESSURE: 109 MMHG | HEART RATE: 90 BPM | OXYGEN SATURATION: 99 %

## 2017-12-30 LAB
BASOPHILS # BLD AUTO: 0.01 K/UL
BASOPHILS NFR BLD: 0.2 %
DIFFERENTIAL METHOD: ABNORMAL
EOSINOPHIL # BLD AUTO: 0 K/UL
EOSINOPHIL NFR BLD: 0.2 %
ERYTHROCYTE [DISTWIDTH] IN BLOOD BY AUTOMATED COUNT: 16.2 %
HCT VFR BLD AUTO: 23.2 %
HGB BLD-MCNC: 8 G/DL
IMM GRANULOCYTES # BLD AUTO: 0.04 K/UL
IMM GRANULOCYTES NFR BLD AUTO: 0.7 %
LYMPHOCYTES # BLD AUTO: 1.7 K/UL
LYMPHOCYTES NFR BLD: 28.6 %
MCH RBC QN AUTO: 29.7 PG
MCHC RBC AUTO-ENTMCNC: 34.5 G/DL
MCV RBC AUTO: 86 FL
MONOCYTES # BLD AUTO: 0.6 K/UL
MONOCYTES NFR BLD: 9.1 %
NEUTROPHILS # BLD AUTO: 3.7 K/UL
NEUTROPHILS NFR BLD: 61.2 %
NRBC BLD-RTO: 0 /100 WBC
PLATELET # BLD AUTO: 138 K/UL
PMV BLD AUTO: 10.5 FL
POCT GLUCOSE: 95 MG/DL (ref 70–110)
RBC # BLD AUTO: 2.69 M/UL
WBC # BLD AUTO: 6.04 K/UL

## 2017-12-30 PROCEDURE — 36415 COLL VENOUS BLD VENIPUNCTURE: CPT

## 2017-12-30 PROCEDURE — 99238 HOSP IP/OBS DSCHRG MGMT 30/<: CPT | Mod: ,,, | Performed by: HOSPITALIST

## 2017-12-30 PROCEDURE — 25000003 PHARM REV CODE 250: Performed by: HOSPITALIST

## 2017-12-30 PROCEDURE — 85025 COMPLETE CBC W/AUTO DIFF WBC: CPT

## 2017-12-30 RX ADMIN — HYPROMELLOSE 2910 1 DROP: 5 SOLUTION OPHTHALMIC at 05:12

## 2017-12-30 RX ADMIN — MEMANTINE HYDROCHLORIDE 5 MG: 5 TABLET ORAL at 09:12

## 2017-12-30 RX ADMIN — NAPROXEN 250 MG: 250 TABLET ORAL at 09:12

## 2017-12-30 RX ADMIN — PANTOPRAZOLE SODIUM 40 MG: 40 TABLET, DELAYED RELEASE ORAL at 09:12

## 2017-12-30 NOTE — PLAN OF CARE
Problem: Patient Care Overview  Goal: Plan of Care Review  Outcome: Ongoing (interventions implemented as appropriate)  Patient remained free from falls this shift. Vital signs stable on room air. Tachycardic. No signs of distress. Needs to be reoriented frequently. Assistance provided with positioning and incontinence care. SCD's and heel protectors on. Bed in lowest position, wheels locked. Monitoring closely.

## 2017-12-30 NOTE — PLAN OF CARE
Spoke with Dr. Jarvis he stated this pt is ready to d/c this a.m., he has called her dgt, and is aware this pt will be d/c'd this morning.     Called PHN, spoke to the answering service, awaiting a call back from the on call nurse to get the HH and ambulance auth.       0748- Recv'd a call back from Nydia with [employer], advised pt needs HH and ambulance auth and I have already faxed over the orders for both. She stated the HH has been arranged with [facility]. She will call me back with the ambulance auth once she has it built in their system.     0809- Recv'd a call back from Char with N stating the ambulance auth is [id number].    0810- Spoke to Misha in [facility] dispatch advised him of the PHN auth and he stated he has this pt scheduled for p/u at 1000.    0812- Spoke to pt's nurse Anne (x- [phone]), advised her pt will be leaving at 1000 via ambulance, I am tubing down a d/c packet to #93, and the doctor called the dgt this morning and she is aware.

## 2017-12-30 NOTE — PROGRESS NOTES
Pts daughter at bedside, verbalized understanding. PIV removed, catheter intact. NAD noted at time of transfer. Pts daughter at bedside. Personal belongings sent with pt.

## 2017-12-30 NOTE — PLAN OF CARE
Pt medically ready for discharge and SW awaiting authorization from the insurance company. Daughter plans to arrive at the hospital shortly. Please discharge when able.     Chaparro Jarvis MD

## 2018-01-03 ENCOUNTER — PATIENT OUTREACH (OUTPATIENT)
Dept: ADMINISTRATIVE | Facility: CLINIC | Age: 83
End: 2018-01-03

## 2018-01-03 LAB — METHYLMALONATE SERPL-SCNC: 0.17 UMOL/L

## 2018-01-03 NOTE — PROGRESS NOTES
C3 nurse attempted to contact patient. No answer.  C3 nurse attempted to contact Kim for a TCC post hospital discharge follow up call. No answer at phone number listed and no voicemail available. The patient does not have a scheduled HOSFU appointment within 7-14 days post hospital discharge date 12/30/18. Message sent to Physician staff to assist with HOSFU appointment scheduling.

## 2018-01-04 ENCOUNTER — OFFICE VISIT (OUTPATIENT)
Dept: INTERNAL MEDICINE | Facility: CLINIC | Age: 83
End: 2018-01-04
Payer: MEDICARE

## 2018-01-04 DIAGNOSIS — F02.80 ALZHEIMER'S DEMENTIA WITHOUT BEHAVIORAL DISTURBANCE, UNSPECIFIED TIMING OF DEMENTIA ONSET: ICD-10-CM

## 2018-01-04 DIAGNOSIS — E43 SEVERE PROTEIN-CALORIE MALNUTRITION: ICD-10-CM

## 2018-01-04 DIAGNOSIS — R26.2 INABILITY TO AMBULATE DUE TO MULTIPLE JOINTS: ICD-10-CM

## 2018-01-04 DIAGNOSIS — G30.1 LATE ONSET ALZHEIMER'S DISEASE WITH BEHAVIORAL DISTURBANCE: Chronic | ICD-10-CM

## 2018-01-04 DIAGNOSIS — E88.09 HYPOALBUMINEMIA DUE TO PROTEIN-CALORIE MALNUTRITION: ICD-10-CM

## 2018-01-04 DIAGNOSIS — L89.45: ICD-10-CM

## 2018-01-04 DIAGNOSIS — D69.6 THROMBOCYTOPENIA: ICD-10-CM

## 2018-01-04 DIAGNOSIS — F02.818 LATE ONSET ALZHEIMER'S DISEASE WITH BEHAVIORAL DISTURBANCE: Chronic | ICD-10-CM

## 2018-01-04 DIAGNOSIS — M05.731 RHEUMATOID ARTHRITIS INVOLVING BOTH WRISTS WITH POSITIVE RHEUMATOID FACTOR: Chronic | ICD-10-CM

## 2018-01-04 DIAGNOSIS — E86.0 DEHYDRATION: ICD-10-CM

## 2018-01-04 DIAGNOSIS — G30.9 ALZHEIMER'S DEMENTIA WITHOUT BEHAVIORAL DISTURBANCE, UNSPECIFIED TIMING OF DEMENTIA ONSET: ICD-10-CM

## 2018-01-04 DIAGNOSIS — D63.8 ANEMIA OF CHRONIC DISORDER: Chronic | ICD-10-CM

## 2018-01-04 DIAGNOSIS — J18.9 PNEUMONIA OF LOWER LOBE DUE TO INFECTIOUS ORGANISM, UNSPECIFIED LATERALITY: ICD-10-CM

## 2018-01-04 DIAGNOSIS — M05.732 RHEUMATOID ARTHRITIS INVOLVING BOTH WRISTS WITH POSITIVE RHEUMATOID FACTOR: Chronic | ICD-10-CM

## 2018-01-04 DIAGNOSIS — D69.2 SENILE PURPURA: ICD-10-CM

## 2018-01-04 DIAGNOSIS — E46 HYPOALBUMINEMIA DUE TO PROTEIN-CALORIE MALNUTRITION: ICD-10-CM

## 2018-01-04 PROCEDURE — 99499 UNLISTED E&M SERVICE: CPT | Mod: S$GLB,,, | Performed by: INTERNAL MEDICINE

## 2018-01-04 PROCEDURE — 99350 HOME/RES VST EST HIGH MDM 60: CPT | Mod: ,,, | Performed by: INTERNAL MEDICINE

## 2018-01-04 PROCEDURE — 99999 PR PBB SHADOW E&M-EST. PATIENT-LVL III: CPT | Mod: PBBFAC,,, | Performed by: INTERNAL MEDICINE

## 2018-01-04 NOTE — Clinical Note
I saw Ms Bah in her home yesterday and spoke to her daughter by phone. Alicia is going to speak with her brother about re-enrolling in hospice.   I just saw in your note that she wants to change PCPs. I'll address that with her at the follow-up.  Thanks! KJ

## 2018-01-05 VITALS — RESPIRATION RATE: 17 BRPM | DIASTOLIC BLOOD PRESSURE: 86 MMHG | SYSTOLIC BLOOD PRESSURE: 123 MMHG | HEART RATE: 100 BPM

## 2018-01-05 PROBLEM — D69.6 THROMBOCYTOPENIA: Status: ACTIVE | Noted: 2018-01-05

## 2018-01-05 PROBLEM — D69.2 SENILE PURPURA: Status: ACTIVE | Noted: 2018-01-05

## 2018-01-05 PROCEDURE — 99499 UNLISTED E&M SERVICE: CPT | Mod: S$GLB,,, | Performed by: INTERNAL MEDICINE

## 2018-01-05 NOTE — ASSESSMENT & PLAN NOTE
Multiple sacral wounds, with BID diaper changes in the home  · Family advised to change diapers q2-4 hours with immediate change after BM  · Family advised to hire sitter  · Family advised to reconsider hospice  · PCP to monitor  · Continue HH wound care

## 2018-01-05 NOTE — ASSESSMENT & PLAN NOTE
Dehydration due to UTI and decreased PO intake  · PRN IVF  · Family refusing PEG  · Advised to increase PO intake

## 2018-01-05 NOTE — ASSESSMENT & PLAN NOTE
Bedbound status, minimally interactive  · Continue home-based care  · Family advised to re-enroll in hospice

## 2018-01-05 NOTE — ASSESSMENT & PLAN NOTE
Previously followed by Rheum, patient can not tolerate DMARD, numerous contractions on PE  · Continue NSAIDs

## 2018-01-05 NOTE — ASSESSMENT & PLAN NOTE
Low albumin, low muscle mass, fatigue, elevated ferritin, anemia  · Advised to increase PO intake  · Defer PEG placement

## 2018-01-05 NOTE — PROGRESS NOTES
"Primary Care Provider Appointment- HOME VISIT    Subjective:      Patient ID: Kim Bah is a 85 y.o. female with CHF, malnutrition, pressure ulcers, alzheimers, CAD, recurrent dehydration, bed bound status    Chief Complaint: Hospital Follow Up    Patient seen in home for PCP hospital discharge follow-up. Her daughter, Alicia Parker, was not present but her grandson and his friends were in the home with the patient at the time of the visit. Patient found in bed, at baseline state of health, watching the [employer] channel.     Patient was on hospice until acute illness on 12/27 one week ago when her daughter brought her to ED for diffuse swelling 2/2 hypoalbuminemia and acute CHF exacerbation. Patient also found to have dehydration, 3rd spacing, possible PNA and recurrence of UTI, failure to thrive at hospital eval. She was treated with IVF and a short course of antibiotics for PNA and UTI. During admission patient expressed to hospitalist that she did not want to be in the hospital and wanted to continue treatment in her home. Patient's daughter insisted on additional consults and interventions, but patient was ultimately discharged once work-up complete and without additional consults requested by daughter. She has completed her augmentin course since discharge.    During conversation today, without daughter present, patient clearly expressed that she does not want to be admitted to the hospital again and wants supportive care in the home. When patient's daughter, Alicia Parker, was called on the phone for teleconference, she stated "It's not about what she wants" in regards to her mother's plan of care.    Her U/LE edema with resolution today, but continued physical exam findings of dehydration. Her grandson reported that she does not each much, but will take sips and bites for his mother (Alicia). Alicia and grandson indorsed BID diaper changes for the patient. Patient's daughter performing wound " care, but patient was discharged with  orders for wound care in the home.     Alicia recently revoked hospice resources, and requested to speak with her brother before restarting that program. At this appointment she agreed to continue with PCP as Dr Blackwell.    Past Surgical History:   Procedure Laterality Date     SECTION      EYE SURGERY      FRACTURE SURGERY      right femur with pin implants 2013    HERNIA REPAIR      HYSTERECTOMY      THYROIDECTOMY         Past Medical History:   Diagnosis Date    Arthritis     Coronary artery disease involving native coronary artery of native heart without angina pectoris 2017    Dementia     Difficult intubation     Rheumatoid arthritis(714.0)     follows with Dr. Dayron Babb     Right Sided     Thyroid disease     thyroidectomy 20 years ago       Review of Systems   Constitutional: Positive for activity change, appetite change, fatigue and unexpected weight change.   Respiratory: Negative for cough and shortness of breath.    Cardiovascular: Negative for chest pain and leg swelling.   Genitourinary: Positive for decreased urine volume, difficulty urinating, dysuria and enuresis.   Musculoskeletal: Positive for gait problem. Negative for joint swelling.   Hematological: Bruises/bleeds easily.   Psychiatric/Behavioral: Positive for dysphoric mood. Negative for behavioral problems and decreased concentration.       Objective:   /86   Pulse 100   Resp 17   LMP  (LMP Unknown)     Physical Exam   Constitutional:   malnourished   HENT:   Temporal wasting   Eyes: EOM are normal. Right eye exhibits discharge.   Cardiovascular:   tachycardic   Pulmonary/Chest: Effort normal.   Musculoskeletal: She exhibits edema and deformity.   Non pitting edema in U/L extremities (improved from ED physical exam)   Neurological: She is alert.   Contractures in U/LE bilaterally   Psychiatric:   Interactive, but not fluent with words       Lab  Results   Component Value Date    WBC 6.04 12/30/2017    HGB 8.0 (L) 12/30/2017    HCT 23.2 (L) 12/30/2017     (L) 12/30/2017    CHOL 200 (H) 12/29/2013    CHOL 202 (H) 12/29/2013    TRIG 65 12/29/2013    TRIG 65 12/29/2013    HDL 47 12/29/2013    HDL 46 12/29/2013    ALT 32 12/27/2017    AST 37 12/27/2017     (L) 12/28/2017    K 4.1 12/28/2017     12/28/2017    CREATININE 0.6 12/28/2017    BUN 15 12/28/2017    CO2 27 12/28/2017    TSH 0.555 06/27/2017    INR 1.2 05/20/2017    HGBA1C 5.5 12/29/2013             Assessment:   85 y.o. female with multiple co-morbid illnesses here to continue work-up of chronic issues notably CHF, malnutrition, pressure ulcers, alzheimers, CAD, recurrent dehydration, bed bound status    Plan:     Problem List Items Addressed This Visit        Neuro    Late onset Alzheimer's disease with behavioral disturbance (Chronic)     Bedbound status, minimally interactive  · Continue home-based care  · Family advised to re-enroll in hospice         Dementia     Bedbound status, minimally interactive  · Family advised to re-enroll in hospice            Derm    Unstageable pressure injury of skin and tissue     Multiple sacral wounds, with BID diaper changes in the home  · Family advised to change diapers q2-4 hours with immediate change after BM  · Family advised to hire sitter  · Family advised to reconsider hospice  · PCP to monitor  · Continue  wound care            Pulmonary    Pneumonia of lower lobe due to infectious organism     Treated with IV antibiotics on 12/27, and discharged on augmentin  · Completed augmentin course  · Monitor             Renal/    Dehydration     Dehydration due to UTI and decreased PO intake  · PRN IVF  · Family refusing PEG  · Advised to increase PO intake            Hematology    Thrombocytopenia     Seen on labs 12/30, likely due to BM suppression  · monitor           Senile purpura     Ecchymoses and purpura on UE bilaterally  · monitor             Oncology    Anemia of chronic disorder (Chronic)     Poor PO intake, decompensated CHF   · Continue conservative care            Endocrine    Severe protein-calorie malnutrition     Low albumin, low muscle mass, fatigue, elevated ferritin, anemia  · Advised to increase PO intake  · Defer PEG placement            Orthopedic    Rheumatoid arthritis involving both wrists with positive rheumatoid factor (Chronic)     Previously followed by Rheum, patient can not tolerate DMARD, numerous contractions on PE  · Continue NSAIDs            Other    Inability to ambulate due to multiple joints     Bed-bound status, minimally interactive  · Family advised to re-enroll in hospice         Hypoalbuminemia due to protein-calorie malnutrition     Low albumin, low muscle mass, fatigue, elevated ferritin, anemia  · Advised to increase PO intake  · Defer PEG placement               Health Maintenance       Date Due Completion Date    TETANUS VACCINE 02/08/1950 ---    DEXA SCAN 02/08/1972 ---    Zoster Vaccine 02/08/1992 ---    Influenza Vaccine 08/01/2017 ---    Pneumococcal (65+) (2 of 2 - PPSV23) 09/01/2017 9/1/2016    Lipid Panel 12/29/2018 12/29/2013          Return in about 2 weeks (around 1/18/2018). . One hour spent with this patient today, half of that in counseling.    Dolly Blackwell MD/MPH  Internal Medicine  [facility]  [phone]

## 2018-01-05 NOTE — ASSESSMENT & PLAN NOTE
Treated with IV antibiotics on 12/27, and discharged on augmentin  · Completed augmentin course  · Monitor

## 2018-01-10 ENCOUNTER — TELEPHONE (OUTPATIENT)
Dept: INTERNAL MEDICINE | Facility: CLINIC | Age: 83
End: 2018-01-10

## 2018-01-10 NOTE — TELEPHONE ENCOUNTER
----- Message from Radha Frazier sent at 1/10/2018  1:43 PM CST -----  Contact: [place]/[facility]/[phone]/phone [phone]/fax  [place] called in regards patient was admitted to [facility] on 12/31/17  Equipment that needs to be take off from  DME company which is [employer].  And if the doctor would like to order this equipment she will have to request it.   #Hospital bed  #matris  #dinorah chair  #suxcion machine  #oxygen concentrator  #nebulizer  Please call and advise. Thank you!!!

## 2018-01-11 NOTE — TELEPHONE ENCOUNTER
----- Message from Tiffany Yeung sent at 1/11/2018  3:21 PM CST -----  Contact: Jaki/[facility] [phone]  Calling to inform you that the patient has wounds on her left hip area. The patient's daughter has been caring for the wounds. Needs to know what you would like her to do. Heart rate is elevated Monday was 107 and today was 117.    Please call and advise.    Thank You

## 2018-01-11 NOTE — TELEPHONE ENCOUNTER
Please call daughter and ask her if she switched home health companies. If so, all of the old equipment needs to be returned and we have to order new equipment.    Also, do she and her brother want to speak about goals of care with me? If so, then we need to set up a teleconference.     Please also let her know that she always has the option of changing PCPs. I am always willing to continue working with her, but she expressed interest in changing doctors during hospitalization. Please see if she still wants to do that.    Thanks,  KJ

## 2018-01-12 NOTE — TELEPHONE ENCOUNTER
Spoke with patient daughter and she informed me that she has not yet switched home health company's .  Also that the equipment that she has shall remain due to it coming from the new home health company that will take over .    Patient daughter was advised to setup a teleconference but stated that her brother works and that she is in control of her mother well being and that Dr. Blackwell can speak with her.    Also when asked about switching PCP , patient daughter explained that she was not looking to change PCP due to her mother being bed bound and it clearly makes no sense to change at this moment.    Patient daughter informed me that patient has so fluid that is in patient pelvic area and upper thighs.

## 2018-01-15 ENCOUNTER — PATIENT MESSAGE (OUTPATIENT)
Dept: INTERNAL MEDICINE | Facility: CLINIC | Age: 83
End: 2018-01-15

## 2018-01-15 RX ORDER — IBUPROFEN 800 MG/1
800 TABLET ORAL EVERY 6 HOURS PRN
OUTPATIENT
Start: 2018-01-15

## 2018-01-15 NOTE — TELEPHONE ENCOUNTER
----- Message from Micaela Varghese sent at 1/15/2018  3:04 PM CST -----  Contact: [facility] home health- [place]  Pt has a elevated heart rate it is 118,the nurse took the call.

## 2018-01-15 NOTE — TELEPHONE ENCOUNTER
Narcisa with Home Health is calling states she is with pt and concerned because she has a heart rate of 118 and not taking enough fluids . She is concerned that she is dehydrated. She states she has multiple stage 2 ulcers including on her left hip. I spoke with Dr Rodgers , she has instructed the pat be sent to ER, Narcisa still on line and made aware . She is going to notify the pt's daughter.

## 2018-02-07 ENCOUNTER — TELEPHONE (OUTPATIENT)
Dept: INTERNAL MEDICINE | Facility: CLINIC | Age: 83
End: 2018-02-07

## 2018-02-07 NOTE — TELEPHONE ENCOUNTER
Please confirm with patient's daughter (Alicia Parker) a home visit on Fri 2/9 at 12pm.    Thanks,  KJ

## 2018-02-08 ENCOUNTER — TELEPHONE (OUTPATIENT)
Dept: INTERNAL MEDICINE | Facility: CLINIC | Age: 83
End: 2018-02-08

## 2018-02-08 NOTE — TELEPHONE ENCOUNTER
Pt's daughter called and asked if you can come today, she says Ms. Bah has not eaten all day and she is concerned. Please advise

## 2018-02-08 NOTE — TELEPHONE ENCOUNTER
Please let patient's daughter know that she should give her mom small sips of water until tomorrow. I unfortunately can not get there today, but will see them tomorrow at noon.    Thanks,  DAVIE

## 2018-02-09 ENCOUNTER — OFFICE VISIT (OUTPATIENT)
Dept: INTERNAL MEDICINE | Facility: CLINIC | Age: 83
End: 2018-02-09
Payer: MEDICARE

## 2018-02-09 PROCEDURE — 99999 PR PBB SHADOW E&M-EST. PATIENT-LVL I: CPT | Mod: PBBFAC,,, | Performed by: INTERNAL MEDICINE

## 2019-09-30 ENCOUNTER — TELEPHONE (OUTPATIENT)
Dept: PRIMARY CARE CLINIC | Facility: CLINIC | Age: 84
End: 2019-09-30

## 2019-10-30 ENCOUNTER — TELEPHONE (OUTPATIENT)
Dept: INTERNAL MEDICINE | Facility: CLINIC | Age: 84
End: 2019-10-30

## 2020-07-22 NOTE — ASSESSMENT & PLAN NOTE
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                   Subjective: Patient reports that her wound is healed and her MD has instructed her to begin stretches  Objective: See treatment diary below      Assessment: Tolerated treatment well  Patient would benefit from continued PT      Plan: Continue per plan of care        Precautions: BCA      Manuals  5/27 5/28 6/1 6/3 6/8 6/10 7/22     PROM - flexion and abd  10 10 10 7 10 10 10     MFR  15 10 10 10 10 10      REV        10                  Neuro Re-Ed                          SBS       10 x :05                                                                       Ther Ex             pulley  5' 5' 5' 5' 5' 8' 5'     Table slides 5x 5x :10 5 x :10 5 x :10 10 x :10 10x :10 10 x :10 np     Elbow ext x5 x10 10 x :10 X 10 X 15 x15 hep      Wrist ext  x5 10 x :05 10 x :05 10 x :05 15 x :05 15 x :05 hep      Ladder climb flex/scap     5 x :10 10 x :10 10 x :10 10 x :10     Doorway ER       nv 5 x :20                               Ther Activity                                       Gait Training                                       Modalities

## 2022-07-11 NOTE — PLAN OF CARE
Daniela and Carlos A notified the authorization has been renewed.  [facility], they are going to overnight the medication to him.    Per chart notes, pt was dc late Friday evening to home w hh set up per BLANCA Diane  Future Appointments  Date Time Provider Department Center   7/19/2017 1:40 PM Pavel Power MD [facility]        07/03/17 0833   Final Note   Assessment Type Final Discharge Note   Discharge Disposition Home-Health   Discharge planning education complete? Yes   Hospital Follow Up  Appt(s) scheduled? Yes   Discharge plans and expectations educations in teach back method with documentation complete? Yes   Offered [facility] -- Bedside Delivery? n/a   Discharge/Hospital Encounter Summary to (non-[facility]) PCP n/a   Referral to Outpatient Case Management complete? n/a   Referral to / orders for Home Health Complete? Yes   30 day supply of medicines given at discharge, if documented non-compliance / non-adherence? No   Any social issues identified prior to discharge? No   Did you assess the readiness or willingness of the family or caregiver to support self management of care? Yes   Right Care Referral Info   Post Acute Recommendation Home-care

## 2022-09-26 NOTE — TELEPHONE ENCOUNTER
Ongoing SW/CM Assessment/Plan of Care Note     See SW/CM flowsheets for goals and other objective data.    Progress note:  Pt is oon with [facility] for Rehab; sw to f/u with the pt regarding other AIR choices.     Current Status  Physical Therapy: Recommends daily skilled therapy for 1-3 hours a day.    Barriers to discharge:   AIR acceptance & Insurance auth    Discharge plan:  AIR vs SNF    CM/SW team to continue to follow for discharge needs.     Please call patient's daughter, Alicia Parker, to confirm that I will perform a home visit tomorrow at 12pm.    Thanks,  KJ

## 2023-03-16 NOTE — SUBJECTIVE & OBJECTIVE
Past Medical History:   Diagnosis Date    Coronary artery disease involving native coronary artery of native heart without angina pectoris 2017    Dementia     Difficult intubation     Rheumatoid arthritis     follows with Dr. Dayron Bbab     Right Sided     Thyroid disease     thyroidectomy 20 years ago       Past Surgical History:   Procedure Laterality Date     SECTION      EYE SURGERY      FRACTURE SURGERY      right femur with pin implants 2013    HERNIA REPAIR      HYSTERECTOMY      THYROIDECTOMY         Immunization History   Administered Date(s) Administered    Pneumococcal Conjugate - 13 Valent 2016       Review of patient's allergies indicates:   Allergen Reactions    Tramadol Other (See Comments)     Hallucinations; psychosis     Current Facility-Administered Medications   Medication Frequency    aspirin EC tablet 81 mg Daily    dextrose 50% injection 12.5 g PRN    dextrose 50% injection 25 g PRN    enoxaparin injection 40 mg Daily    glucagon (human recombinant) injection 1 mg PRN    glucose chewable tablet 16 g PRN    glucose chewable tablet 24 g PRN    lidocaine 5 % patch 1 patch Daily    naproxen sodium tablet 275 mg BID WM    olanzapine injection 2.5 mg Q8H PRN    pantoprazole EC tablet 40 mg Daily    predniSONE tablet 5 mg Daily     Family History     Problem Relation (Age of Onset)    Cancer Maternal Aunt        Social History Main Topics    Smoking status: Never Smoker    Smokeless tobacco: Not on file    Alcohol use No    Drug use: No    Sexual activity: Not Currently     Partners: Female     Birth control/ protection: Abstinence     Review of Systems   Constitutional: Negative for chills and fever.   HENT: Negative for mouth sores and trouble swallowing.    Eyes: Negative for visual disturbance.   Respiratory: Negative for shortness of breath.    Gastrointestinal: Negative for abdominal pain, nausea and vomiting.   Musculoskeletal:  Positive for arthralgias. Negative for joint swelling.   Skin: Negative for color change, pallor, rash and wound.   Neurological: Negative for tremors.   Psychiatric/Behavioral: Positive for confusion.     Objective:     Vital Signs (Most Recent):  Temp: 98.5 °F (36.9 °C) (06/29/17 1117)  Pulse: 73 (06/29/17 1117)  Resp: 17 (06/29/17 1117)  BP: (!) 121/59 (06/29/17 1117)  SpO2: 96 % (06/29/17 1117)  O2 Device (Oxygen Therapy): room air (06/29/17 1117) Vital Signs (24h Range):  Temp:  [97.7 °F (36.5 °C)-98.9 °F (37.2 °C)] 98.5 °F (36.9 °C)  Pulse:  [67-87] 73  Resp:  [16-20] 17  SpO2:  [93 %-96 %] 96 %  BP: ()/(41-59) 121/59     Weight: 72.6 kg (160 lb) (06/27/17 2000)  Body mass index is 35.87 kg/m².  Body surface area is 1.69 meters squared.    No intake or output data in the 24 hours ending 06/29/17 1354    Physical Exam   Constitutional: She is well-developed, well-nourished, and in no distress.   HENT:   Mouth/Throat: Oropharynx is clear and moist.   Eyes: Conjunctivae are normal. No scleral icterus.   Neck: Normal range of motion.   Cardiovascular: Normal rate, regular rhythm and intact distal pulses.    Pulmonary/Chest: Effort normal and breath sounds normal.   Abdominal: Soft. There is no tenderness.       Right Side Rheumatological Exam     The patient is tender to palpation of the 4th MCP, 1st MTP, 2nd MTP, 3rd MTP, 4th MTP, 5th MTP, 1st toe IP, 2nd toe IP, 3rd toe IP, 4th toe IP and 5th toe IP    Left Side Rheumatological Exam     The patient is tender to palpation of the 5th MCP, 1st MTP, 2nd MTP, 3rd MTP, 4th MTP, 5th MTP, 1st toe IP, 2nd toe IP, 3rd toe IP, 4th toe IP and 5th toe IP.      Skin: Skin is warm and dry. No rash noted.     Musculoskeletal: She exhibits tenderness and deformity.   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  CMP: No results for input(s): GLU,  CALCIUM, ALBUMIN, PROT, NA, K, CO2, CL, BUN, CREATININE, ALKPHOS, ALT, AST, BILITOT in the last 24 hours.  CRP: No results for input(s): CRP in the last 24 hours.  ESR: No results for input(s): SEDRATE in the last 24 hours.  All pertinent lab results from the last 24 hours have been reviewed.    Significant Imaging:  Imaging results within the past 24 hours have been reviewed.   Patient expressed no known problems or needs

## 2023-04-20 NOTE — TELEPHONE ENCOUNTER
OK to give verbal order for Fleets enema every other day PRN for constipation.     The patient denies any pain or discomfort and states he's ready to go.

## 2023-04-23 NOTE — TELEPHONE ENCOUNTER
"Pt has had now 3 hospitalizations since her last visit with me in 1/2017.  From her discharge summary it seems as though her dementia has progressed.      Please advise pt's daughter that it would not be safe for pt to take sedative medication (such as Valium) in an unmonitored setting such as at home.      Also from the discharge summary, patient "was seen by Neurology service with recommendations for analgesic therapy and probable diagnosis of pain induced delirium."  OK therefore to complete the medical necessity form for the transdermal lidocaine.        "
I gave [facility] the medical necessity form and since  is out she will consult with dr Hernandez to see what Dr. Power is rec.  
minimum assist (75% patients effort)

## 2023-07-31 NOTE — ED NOTES
Patient brought in via EMS due to loss of consciousness at home during bath.    Deep Sutures: 5-0 Monocryl

## 2024-08-21 NOTE — NURSING
Enema given at this time. Pt unable to hold fluids in rectum. No fecal matter noted. Pt cleaned up and new brief placed.   
Reviewed discharge with pt daughter, verbal understanding given with raina,  Placed pt in wheelchair, daughter concerned that pt would not be able to support herself sitting in van for the ride home and then she would be unable to transfer her, explained that if [facility] came and deemed her able to sit that she would be responsible for payment by charge RN.   on call notified by charge RN, awaiting call from   
Spoke with pt daughter who stated no one would be able to receive the pt at home until 1630 due to the daughter being in class until then.  Left message for Roxi DENNISON to update.  
19